# Patient Record
Sex: FEMALE | Race: BLACK OR AFRICAN AMERICAN | NOT HISPANIC OR LATINO
[De-identification: names, ages, dates, MRNs, and addresses within clinical notes are randomized per-mention and may not be internally consistent; named-entity substitution may affect disease eponyms.]

---

## 2017-07-06 ENCOUNTER — APPOINTMENT (OUTPATIENT)
Dept: PLASTIC SURGERY | Facility: CLINIC | Age: 47
End: 2017-07-06

## 2017-07-06 DIAGNOSIS — Z87.39 PERSONAL HISTORY OF OTHER DISEASES OF THE MUSCULOSKELETAL SYSTEM AND CONNECTIVE TISSUE: ICD-10-CM

## 2017-07-06 DIAGNOSIS — Z78.9 OTHER SPECIFIED HEALTH STATUS: ICD-10-CM

## 2017-07-06 DIAGNOSIS — Z86.79 PERSONAL HISTORY OF OTHER DISEASES OF THE CIRCULATORY SYSTEM: ICD-10-CM

## 2017-07-06 PROBLEM — Z00.00 ENCOUNTER FOR PREVENTIVE HEALTH EXAMINATION: Status: ACTIVE | Noted: 2017-07-06

## 2017-07-06 RX ORDER — CHLORHEXIDINE GLUCONATE, 0.12% ORAL RINSE 1.2 MG/ML
0.12 SOLUTION DENTAL
Qty: 473 | Refills: 0 | Status: ACTIVE | COMMUNITY
Start: 2017-06-23

## 2017-07-06 RX ORDER — OXYCODONE AND ACETAMINOPHEN 5; 325 MG/1; MG/1
5-325 TABLET ORAL
Qty: 20 | Refills: 0 | Status: ACTIVE | COMMUNITY
Start: 2017-06-23

## 2017-07-06 RX ORDER — IBUPROFEN 600 MG/1
600 TABLET, FILM COATED ORAL
Qty: 20 | Refills: 0 | Status: ACTIVE | COMMUNITY
Start: 2017-06-23

## 2017-07-06 RX ORDER — ALPRAZOLAM 2 MG/1
TABLET ORAL
Refills: 0 | Status: ACTIVE | COMMUNITY

## 2017-07-06 RX ORDER — LOSARTAN POTASSIUM AND HYDROCHLOROTHIAZIDE 12.5; 5 MG/1; MG/1
50-12.5 TABLET ORAL
Qty: 15 | Refills: 0 | Status: ACTIVE | COMMUNITY
Start: 2017-06-19

## 2017-07-06 RX ORDER — DICLOFENAC EPOLAMINE 0.01 G/1
1.3 SYSTEM TOPICAL
Qty: 30 | Refills: 0 | Status: ACTIVE | COMMUNITY
Start: 2017-01-18

## 2017-07-10 ENCOUNTER — OUTPATIENT (OUTPATIENT)
Dept: OUTPATIENT SERVICES | Facility: HOSPITAL | Age: 47
LOS: 1 days | End: 2017-07-10
Payer: COMMERCIAL

## 2017-07-10 PROCEDURE — 70487 CT MAXILLOFACIAL W/DYE: CPT

## 2017-07-10 PROCEDURE — 73706 CT ANGIO LWR EXTR W/O&W/DYE: CPT

## 2017-07-10 PROCEDURE — 70491 CT SOFT TISSUE NECK W/DYE: CPT | Mod: 26

## 2017-07-10 PROCEDURE — 70491 CT SOFT TISSUE NECK W/DYE: CPT

## 2017-07-10 PROCEDURE — 73706 CT ANGIO LWR EXTR W/O&W/DYE: CPT | Mod: 26,50

## 2017-07-10 PROCEDURE — 70487 CT MAXILLOFACIAL W/DYE: CPT | Mod: 26

## 2017-07-12 ENCOUNTER — OUTPATIENT (OUTPATIENT)
Dept: OUTPATIENT SERVICES | Facility: HOSPITAL | Age: 47
LOS: 1 days | End: 2017-07-12
Payer: COMMERCIAL

## 2017-07-12 ENCOUNTER — RESULT REVIEW (OUTPATIENT)
Age: 47
End: 2017-07-12

## 2017-07-12 DIAGNOSIS — C41.1 MALIGNANT NEOPLASM OF MANDIBLE: ICD-10-CM

## 2017-07-14 ENCOUNTER — OUTPATIENT (OUTPATIENT)
Dept: OUTPATIENT SERVICES | Facility: HOSPITAL | Age: 47
LOS: 1 days | End: 2017-07-14
Payer: COMMERCIAL

## 2017-07-14 PROCEDURE — 78815 PET IMAGE W/CT SKULL-THIGH: CPT

## 2017-07-14 PROCEDURE — 78815 PET IMAGE W/CT SKULL-THIGH: CPT | Mod: 26

## 2017-07-14 PROCEDURE — A9552: CPT

## 2017-07-25 VITALS
HEART RATE: 82 BPM | HEIGHT: 66.5 IN | DIASTOLIC BLOOD PRESSURE: 83 MMHG | TEMPERATURE: 98 F | WEIGHT: 227.3 LBS | SYSTOLIC BLOOD PRESSURE: 142 MMHG | OXYGEN SATURATION: 100 % | RESPIRATION RATE: 16 BRPM

## 2017-07-25 NOTE — PATIENT PROFILE ADULT. - PMH
Anxiety    GERD (gastroesophageal reflux disease)    HTN (hypertension)    SCC (squamous cell carcinoma)  of mouth

## 2017-07-26 ENCOUNTER — INPATIENT (INPATIENT)
Facility: HOSPITAL | Age: 47
LOS: 7 days | Discharge: HOME HEALTH | DRG: 12 | End: 2017-08-03
Attending: DENTIST | Admitting: DENTIST
Payer: COMMERCIAL

## 2017-07-26 ENCOUNTER — RESULT REVIEW (OUTPATIENT)
Age: 47
End: 2017-07-26

## 2017-07-26 DIAGNOSIS — Z98.890 OTHER SPECIFIED POSTPROCEDURAL STATES: Chronic | ICD-10-CM

## 2017-07-26 LAB
ANION GAP SERPL CALC-SCNC: 12 MMOL/L — SIGNIFICANT CHANGE UP (ref 5–17)
APTT BLD: 34.6 SEC — SIGNIFICANT CHANGE UP (ref 27.5–37.4)
BASE EXCESS BLDA CALC-SCNC: -3.5 MMOL/L — LOW (ref -2–3)
BUN SERPL-MCNC: 13 MG/DL — SIGNIFICANT CHANGE UP (ref 7–23)
CA-I BLDA-SCNC: 1.05 MMOL/L — LOW (ref 1.12–1.3)
CALCIUM SERPL-MCNC: 8.6 MG/DL — SIGNIFICANT CHANGE UP (ref 8.4–10.5)
CHLORIDE SERPL-SCNC: 106 MMOL/L — SIGNIFICANT CHANGE UP (ref 96–108)
CO2 SERPL-SCNC: 22 MMOL/L — SIGNIFICANT CHANGE UP (ref 22–31)
COHGB MFR BLDA: 0.4 % — SIGNIFICANT CHANGE UP
CREAT SERPL-MCNC: 0.7 MG/DL — SIGNIFICANT CHANGE UP (ref 0.5–1.3)
GAS PNL BLDA: SIGNIFICANT CHANGE UP
GLUCOSE SERPL-MCNC: 162 MG/DL — HIGH (ref 70–99)
HCO3 BLDA-SCNC: 21 MMOL/L — SIGNIFICANT CHANGE UP (ref 21–28)
HCT VFR BLD CALC: 34.5 % — SIGNIFICANT CHANGE UP (ref 34.5–45)
HGB BLD-MCNC: 10.8 G/DL — LOW (ref 11.5–15.5)
HGB BLDA-MCNC: 11.4 G/DL — LOW (ref 11.5–15.5)
INR BLD: 1.21 — HIGH (ref 0.88–1.16)
MAGNESIUM SERPL-MCNC: 1.7 MG/DL — SIGNIFICANT CHANGE UP (ref 1.6–2.6)
MCHC RBC-ENTMCNC: 25.5 PG — LOW (ref 27–34)
MCHC RBC-ENTMCNC: 31.3 G/DL — LOW (ref 32–36)
MCV RBC AUTO: 81.4 FL — SIGNIFICANT CHANGE UP (ref 80–100)
METHGB MFR BLDA: 0.5 % — SIGNIFICANT CHANGE UP
O2 CT VFR BLDA CALC: 16.2 ML/DL — SIGNIFICANT CHANGE UP (ref 15–23)
OXYHGB MFR BLDA: 98 % — SIGNIFICANT CHANGE UP (ref 94–100)
PCO2 BLDA: 38 MMHG — SIGNIFICANT CHANGE UP (ref 32–45)
PH BLDA: 7.37 — SIGNIFICANT CHANGE UP (ref 7.35–7.45)
PHOSPHATE SERPL-MCNC: 3.7 MG/DL — SIGNIFICANT CHANGE UP (ref 2.5–4.5)
PLATELET # BLD AUTO: 152 K/UL — SIGNIFICANT CHANGE UP (ref 150–400)
PO2 BLDA: 188 MMHG — HIGH (ref 83–108)
POTASSIUM BLDA-SCNC: 4.1 MMOL/L — SIGNIFICANT CHANGE UP (ref 3.5–4.9)
POTASSIUM SERPL-MCNC: 4 MMOL/L — SIGNIFICANT CHANGE UP (ref 3.5–5.3)
POTASSIUM SERPL-SCNC: 4 MMOL/L — SIGNIFICANT CHANGE UP (ref 3.5–5.3)
PROTHROM AB SERPL-ACNC: 13.5 SEC — HIGH (ref 9.8–12.7)
RBC # BLD: 4.24 M/UL — SIGNIFICANT CHANGE UP (ref 3.8–5.2)
RBC # FLD: 15.6 % — SIGNIFICANT CHANGE UP (ref 10.3–16.9)
SAO2 % BLDA: 99 % — SIGNIFICANT CHANGE UP (ref 95–100)
SODIUM BLDA-SCNC: 143 MMOL/L — SIGNIFICANT CHANGE UP (ref 138–146)
SODIUM SERPL-SCNC: 140 MMOL/L — SIGNIFICANT CHANGE UP (ref 135–145)
WBC # BLD: 11.5 K/UL — HIGH (ref 3.8–10.5)
WBC # FLD AUTO: 11.5 K/UL — HIGH (ref 3.8–10.5)

## 2017-07-26 PROCEDURE — 40845 RECONSTRUCTION OF MOUTH: CPT | Mod: 59

## 2017-07-26 PROCEDURE — 20969 BONE/SKIN GRAFT MICROVASC: CPT

## 2017-07-26 PROCEDURE — 71010: CPT | Mod: 26

## 2017-07-26 PROCEDURE — 15100 SPLT AGRFT T/A/L 1ST 100SQCM: CPT | Mod: 59

## 2017-07-26 PROCEDURE — 13132 CMPLX RPR F/C/C/M/N/AX/G/H/F: CPT | Mod: 59

## 2017-07-26 PROCEDURE — 99231 SBSQ HOSP IP/OBS SF/LOW 25: CPT | Mod: GC

## 2017-07-26 PROCEDURE — 88321 CONSLTJ&REPRT SLD PREP ELSWR: CPT

## 2017-07-26 PROCEDURE — 97606 NEG PRS WND THER DME>50 SQCM: CPT | Mod: 59

## 2017-07-26 RX ORDER — PROPOFOL 10 MG/ML
1 INJECTION, EMULSION INTRAVENOUS
Qty: 1000 | Refills: 0 | Status: DISCONTINUED | OUTPATIENT
Start: 2017-07-26 | End: 2017-07-27

## 2017-07-26 RX ORDER — PANTOPRAZOLE SODIUM 20 MG/1
40 TABLET, DELAYED RELEASE ORAL DAILY
Qty: 0 | Refills: 0 | Status: DISCONTINUED | OUTPATIENT
Start: 2017-07-27 | End: 2017-08-03

## 2017-07-26 RX ORDER — SODIUM CHLORIDE 9 MG/ML
1000 INJECTION, SOLUTION INTRAVENOUS
Qty: 0 | Refills: 0 | Status: DISCONTINUED | OUTPATIENT
Start: 2017-07-26 | End: 2017-07-28

## 2017-07-26 RX ORDER — METRONIDAZOLE 500 MG
TABLET ORAL
Qty: 0 | Refills: 0 | Status: DISCONTINUED | OUTPATIENT
Start: 2017-07-26 | End: 2017-08-02

## 2017-07-26 RX ORDER — CEFAZOLIN SODIUM 1 G
1000 VIAL (EA) INJECTION ONCE
Qty: 0 | Refills: 0 | Status: COMPLETED | OUTPATIENT
Start: 2017-07-26 | End: 2017-07-26

## 2017-07-26 RX ORDER — AMPICILLIN SODIUM AND SULBACTAM SODIUM 250; 125 MG/ML; MG/ML
3 INJECTION, POWDER, FOR SUSPENSION INTRAMUSCULAR; INTRAVENOUS ONCE
Qty: 0 | Refills: 0 | Status: COMPLETED | OUTPATIENT
Start: 2017-07-26 | End: 2017-07-26

## 2017-07-26 RX ORDER — INSULIN LISPRO 100/ML
VIAL (ML) SUBCUTANEOUS
Qty: 0 | Refills: 0 | Status: DISCONTINUED | OUTPATIENT
Start: 2017-07-26 | End: 2017-08-03

## 2017-07-26 RX ORDER — CEFAZOLIN SODIUM 1 G
VIAL (EA) INJECTION
Qty: 0 | Refills: 0 | Status: DISCONTINUED | OUTPATIENT
Start: 2017-07-26 | End: 2017-08-02

## 2017-07-26 RX ORDER — METRONIDAZOLE 500 MG
500 TABLET ORAL ONCE
Qty: 0 | Refills: 0 | Status: COMPLETED | OUTPATIENT
Start: 2017-07-26 | End: 2017-07-26

## 2017-07-26 RX ORDER — ENOXAPARIN SODIUM 100 MG/ML
40 INJECTION SUBCUTANEOUS DAILY
Qty: 0 | Refills: 0 | Status: DISCONTINUED | OUTPATIENT
Start: 2017-07-26 | End: 2017-08-03

## 2017-07-26 RX ORDER — CEFAZOLIN SODIUM 1 G
1000 VIAL (EA) INJECTION EVERY 8 HOURS
Qty: 0 | Refills: 0 | Status: DISCONTINUED | OUTPATIENT
Start: 2017-07-27 | End: 2017-08-02

## 2017-07-26 RX ORDER — FENTANYL CITRATE 50 UG/ML
0.5 INJECTION INTRAVENOUS
Qty: 2500 | Refills: 0 | Status: DISCONTINUED | OUTPATIENT
Start: 2017-07-26 | End: 2017-07-27

## 2017-07-26 RX ORDER — METRONIDAZOLE 500 MG
500 TABLET ORAL EVERY 8 HOURS
Qty: 0 | Refills: 0 | Status: DISCONTINUED | OUTPATIENT
Start: 2017-07-27 | End: 2017-08-02

## 2017-07-26 RX ADMIN — Medication 100 MILLIGRAM(S): at 23:30

## 2017-07-26 RX ADMIN — AMPICILLIN SODIUM AND SULBACTAM SODIUM 200 GRAM(S): 250; 125 INJECTION, POWDER, FOR SUSPENSION INTRAMUSCULAR; INTRAVENOUS at 21:18

## 2017-07-26 NOTE — CONSULT NOTE ADULT - ATTENDING COMMENTS
47F sp ENT surgery ho HTN. plan to wean sedation and evaluate for SBT/ extubation in immediate post op period. will resume HTN meds in am as guided by BP.

## 2017-07-26 NOTE — H&P ADULT - HISTORY OF PRESENT ILLNESS
History of Present Illness  [id number] 47 F presents for a consultation. 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 Went to dentist and was referred to oral surgery for biopsy who identified SCC - invading mandible.     POD0 s/p Tracheostomy (6-0 shiley), modified right neck dissection, Right segmental mandibulectomy, left fibula MVFF, Left thigh STSG     ActiveProblems_20_twCiteListControlEnd   ActiveProblemsSectionEnd  PastMedicalHistorySectionStart Past Medical History  PastMedicalHistory_20_twCiteListControlStart History of arthritis   History of hypertension PastMedicalHistory_20_twCiteListControlEnd     PastMedicalHistorySectionEnd  SurgicalHistorySectionStart Surgical History  SurgicalHistory_20_twCiteListControlStart History of Cervical Conization Loop Electrode Excision  History of Uterine Myomectomy  SurgicalHistory_20_twCiteListControlEnd     SurgicalHistorySectionEnd   SocialHistorySectionStart Social History  SocialHistory_20_twCiteListControlStart Never a smoker  Social alcohol use SocialHistory_20_twCiteListControlEnd     SocialHistorySectionEnd  CurrentMedsSectionStart   CurrentMeds_4_twCiteListControlEnd   CurrentMedsSectionEnd  AllergiesSectionStart Allergies  Allergies_20_twCiteListControlStart NKDA. Rash w Aspirin     PE  6-0 cuffed shiley with cuff up. Minimal oozing as expectec Stay sutures taped to chest. no trach collar, trach sutured in place  Right neck arterial  and STEPHEN - doppler signal strong  Intra oral flap soft, pink, warm  neck incision c/d/i, stephen holding suction, minimal ss in bulb  Left thigh skin graft site covered with tegaderm  L LE in ace bandage, STEPHEN and Vac holding suction    A/p  - Wean off sedation and ventilor to trach collar  - routine trach care, use red rubbers only to suction trach.   - No trach collar   - bedrest, HOB elevated, head in neutral position, no turning  - resume home medes  - Lovenox 40 daily to start tonight a  - NPO  - IVF and pain management per SICU  - monitoring STEPHEN output  - consider removing radha and krishna tomorrow  Please page ENT with any questions or issues

## 2017-07-26 NOTE — CONSULT NOTE ADULT - SUBJECTIVE AND OBJECTIVE BOX
SICU CONSULT NOTE    HPI:  47 F presents for a consultation. 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 Went to dentist and was referred to oral surgery for biopsy who identified SCC - invading mandible.     POD0 s/p Tracheostomy (6-0 shiley), modified right neck dissection, Right segmental mandibulectomy, left fibula MVFF, Left thigh STSG     SURGERY ADDENDUM:   Admitted to SICU for post-op management.      PAST MEDICAL HISTORY:  Anxiety  SCC (squamous cell carcinoma): of mouth  GERD (gastroesophageal reflux disease)  HTN (hypertension)    PAST SURGICAL HISTORY:   Cervical conization loop electrode excision  Uterine myomectomy     REVIEW OF SYSTEMS:   Pertinent positives/negatives noted in HPI.     HOME MEDICATIONS:  ·  losartan-hydrochlorothiazide 50mg-12.5mg oral tablet: 1 tab(s) orally once a day, Last Dose Taken: 25-Jul-2017 11:00 AM  ·  Xanax 0.5 mg oral tablet: 1 tab(s) orally 3 times a day, As Needed, Last Dose Taken: 25-Jul-2017 11:00 PM    ALLERGIES:   aspirin (Stomach Upset)    SOCIAL HISTORY:  Never smoker. Social EtOH    FAMILY HISTORY:  No pertinent family history in first degree relatives    Vital Signs Last 24 Hrs  T(C): 36.6 (26 Jul 2017 19:00), Max: 36.6 (26 Jul 2017 19:00)  T(F): 97.9 (26 Jul 2017 19:00), Max: 97.9 (26 Jul 2017 19:00)  HR: 76 (26 Jul 2017 21:25) (74 - 78)  BP: 102/57 (26 Jul 2017 21:00) (102/57 - 111/63)  BP(mean): 70 (26 Jul 2017 21:00) (70 - 78)  RR: 12 (26 Jul 2017 21:25) (11 - 12)  SpO2: 99% (26 Jul 2017 21:25) (99% - 99%)    PHYSICAL EXAM:  NEURO: Sedated.  HEENT: Right neck incision C/D/I; arterial doppler signal triphasic; R neck LAWRENCE drain to bulb suction- SS.   CV: RRR, S1&S2.   PULM: 6-0 cuffed shiley trach, CTAB.  ABD: Soft, obese, ND, NT.   EXTR: Left thigh skin graft donor site pink, covered w/ tegaderm. LLE wrapped in ACE bandage; vac in place w/ good seal; LLE LAWRENCE to bulb suction- SS.     LABS:                        10.8   11.5  )-----------( 152      ( 26 Jul 2017 19:51 )             34.5     07-26    140  |  106  |  13  ----------------------------<  162<H>  4.0   |  22  |  0.70    Ca    8.6      26 Jul 2017 19:51  Phos  3.7     07-26  Mg     1.7     07-26      PT/INR - ( 26 Jul 2017 19:51 )   PT: 13.5 sec;   INR: 1.21          PTT - ( 26 Jul 2017 19:51 )  PTT:34.6 sec SICU CONSULT NOTE    HPI:  47 F presents for a consultation. 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 Went to dentist and was referred to oral surgery for biopsy who identified SCC - invading mandible.     POD0 s/p Tracheostomy (6-0 shiley), modified right neck dissection, Right segmental mandibulectomy, left fibula MVFF, Left thigh STSG     SURGERY ADDENDUM:   Admitted to SICU for post-op management.      PAST MEDICAL HISTORY:  Anxiety  SCC (squamous cell carcinoma): of mouth  GERD (gastroesophageal reflux disease)  HTN (hypertension)    PAST SURGICAL HISTORY:   Cervical conization loop electrode excision  Uterine myomectomy     REVIEW OF SYSTEMS:   Pertinent positives/negatives noted in HPI.     HOME MEDICATIONS:  ·  losartan-hydrochlorothiazide 50mg-12.5mg oral tablet: 1 tab(s) orally once a day, Last Dose Taken: 25-Jul-2017 11:00 AM  ·  Xanax 0.5 mg oral tablet: 1 tab(s) orally 3 times a day, As Needed, Last Dose Taken: 25-Jul-2017 11:00 PM    ALLERGIES:   aspirin (Stomach Upset)    SOCIAL HISTORY:  Never smoker. Social EtOH    FAMILY HISTORY:  No pertinent family history in first degree relatives    Vital Signs Last 24 Hrs  T(C): 36.6 (26 Jul 2017 19:00), Max: 36.6 (26 Jul 2017 19:00)  T(F): 97.9 (26 Jul 2017 19:00), Max: 97.9 (26 Jul 2017 19:00)  HR: 76 (26 Jul 2017 21:25) (74 - 78)  BP: 102/57 (26 Jul 2017 21:00) (102/57 - 111/63)  BP(mean): 70 (26 Jul 2017 21:00) (70 - 78)  RR: 12 (26 Jul 2017 21:25) (11 - 12)  SpO2: 99% (26 Jul 2017 21:25) (99% - 99%)    PHYSICAL EXAM:  NEURO: Sedated.  HEENT: Right neck incision C/D/I; arterial doppler signal triphasic; R neck LAWRENCE drain to bulb suction- SS.   CV: RRR, S1&S2.   PULM: 6-0 cuffed shiley trach, CTAB.  ABD: Soft, obese, ND, NT.   EXTR: Left thigh skin graft donor site pink, covered w/ tegaderm. LLE wrapped in ACE bandage; vac in place w/ good seal; LLE LAWRENCE to bulb suction- SS.     LABS:                        10.8   11.5  )-----------( 152      ( 26 Jul 2017 19:51 )             34.5     140  |  106  |  13  ----------------------------<  162<H>  4.0   |  22  |  0.70    Ca    8.6      26 Jul 2017 19:51  Phos  3.7     07-26  Mg     1.7     07-26    PT/INR - ( 26 Jul 2017 19:51 )   PT: 13.5 sec;   INR: 1.21     PTT - ( 26 Jul 2017 19:51 )  PTT:34.6 sec

## 2017-07-26 NOTE — BRIEF OPERATIVE NOTE - OPERATION/FINDINGS
Right mandibular mass excised. surgical margins negative on intraoperative frozen specimens. MVFF left fibula used to reconstruct right mandible. tracheostomy, 6-0 shiley. Selective right neck dissection, Right mandibular mass excised. surgical margins negative on intraoperative frozen specimens. MVFF left fibula used to reconstruct right mandible.

## 2017-07-26 NOTE — BRIEF OPERATIVE NOTE - PROCEDURE
Free flap, fibula, osteocutaneous, with microvascular anastomosis  07/26/2017    Active  [place] [id number]  Mandibulectomy, with tracheostomy  07/26/2017    Active  [place] [id number] Neck dissection, modified radical  07/26/2017    [facility]  Tracheostomy  07/26/2017    [facility]  [facility]  Split thickness skin graft of left thigh  07/26/2017    Active  [facility]

## 2017-07-26 NOTE — H&P ADULT - REASON FOR ADMISSION
Tracheostomy, modified right neck dissection, Right segmental mandibulectomy, left fibula MVFF, Left thigh STSG

## 2017-07-26 NOTE — CONSULT NOTE ADULT - ASSESSMENT
47F w/ mandibular SCC now s/p right mandibular mass excision, selective right ND, left fibula reconstruction of right mandible, STSG from left thigh & tracheostomy.     Neuro: Fentanyl gtt, Propofol gtt  HEENT: flap checks q1hr; arterial doppler monitoring; HOB elevated; head in neutral position; no turning  CV: MAP >65; LR@100  Pulm: 6-0 cuffed shiley trach; red rubber suction only to trach  GI/FEN: NPO, Protonix, Dobhoff- start TFs tomorrow   : Arleen  ID: Ancef, Flagyl  Endo: ISS  Heme: Lovenox   PPx: Lovenox, SCD to RLE  Lines: PIV, A-line  Drains: R neck LAWRENCE to bulb suction; LLE LAWRENCE to bulb suction 47F w/ mandibular SCC now s/p right mandibular mass excision, selective right ND, left fibula reconstruction of right mandible, STSG from left thigh & tracheostomy.     Neuro: Fentanyl gtt, Propofol gtt  HEENT: flap checks q1hr; arterial doppler monitoring; HOB elevated; head in neutral position; no turning  CV: MAP >65; LR@100  Pulm: 6-0 cuffed shiley trach; AC/CMV- 450/12/5/40; red rubber suction only to trach  GI/FEN: NPO, Protonix, Dobhoff- start TFs tomorrow   : Arleen  ID: Ancef, Flagyl  Endo: ISS  Heme: Lovenox   PPx: Lovenox, SCD to RLE  Lines: PIV, A-line  Drains: R neck LAWRENCE to bulb suction; LLE LAWRENCE to bulb suction; Vac to LLE

## 2017-07-27 LAB
ANION GAP SERPL CALC-SCNC: 11 MMOL/L — SIGNIFICANT CHANGE UP (ref 5–17)
BUN SERPL-MCNC: 11 MG/DL — SIGNIFICANT CHANGE UP (ref 7–23)
CALCIUM SERPL-MCNC: 7.8 MG/DL — LOW (ref 8.4–10.5)
CHLORIDE SERPL-SCNC: 107 MMOL/L — SIGNIFICANT CHANGE UP (ref 96–108)
CO2 SERPL-SCNC: 23 MMOL/L — SIGNIFICANT CHANGE UP (ref 22–31)
CREAT SERPL-MCNC: 0.7 MG/DL — SIGNIFICANT CHANGE UP (ref 0.5–1.3)
GLUCOSE SERPL-MCNC: 128 MG/DL — HIGH (ref 70–99)
HCT VFR BLD CALC: 29.7 % — LOW (ref 34.5–45)
HGB BLD-MCNC: 9.6 G/DL — LOW (ref 11.5–15.5)
MAGNESIUM SERPL-MCNC: 1.8 MG/DL — SIGNIFICANT CHANGE UP (ref 1.6–2.6)
MCHC RBC-ENTMCNC: 25.9 PG — LOW (ref 27–34)
MCHC RBC-ENTMCNC: 32.3 G/DL — SIGNIFICANT CHANGE UP (ref 32–36)
MCV RBC AUTO: 80.1 FL — SIGNIFICANT CHANGE UP (ref 80–100)
PHOSPHATE SERPL-MCNC: 3.6 MG/DL — SIGNIFICANT CHANGE UP (ref 2.5–4.5)
PLATELET # BLD AUTO: 137 K/UL — LOW (ref 150–400)
POTASSIUM SERPL-MCNC: 3.6 MMOL/L — SIGNIFICANT CHANGE UP (ref 3.5–5.3)
POTASSIUM SERPL-SCNC: 3.6 MMOL/L — SIGNIFICANT CHANGE UP (ref 3.5–5.3)
RBC # BLD: 3.71 M/UL — LOW (ref 3.8–5.2)
RBC # FLD: 15.6 % — SIGNIFICANT CHANGE UP (ref 10.3–16.9)
SODIUM SERPL-SCNC: 141 MMOL/L — SIGNIFICANT CHANGE UP (ref 135–145)
WBC # BLD: 11.6 K/UL — HIGH (ref 3.8–10.5)
WBC # FLD AUTO: 11.6 K/UL — HIGH (ref 3.8–10.5)

## 2017-07-27 PROCEDURE — 99231 SBSQ HOSP IP/OBS SF/LOW 25: CPT | Mod: GC

## 2017-07-27 PROCEDURE — 71010: CPT | Mod: 26

## 2017-07-27 PROCEDURE — 71010: CPT | Mod: 26,77

## 2017-07-27 RX ORDER — HYDROMORPHONE HYDROCHLORIDE 2 MG/ML
1 INJECTION INTRAMUSCULAR; INTRAVENOUS; SUBCUTANEOUS EVERY 4 HOURS
Qty: 0 | Refills: 0 | Status: DISCONTINUED | OUTPATIENT
Start: 2017-07-27 | End: 2017-07-28

## 2017-07-27 RX ORDER — MAGNESIUM SULFATE 500 MG/ML
1 VIAL (ML) INJECTION ONCE
Qty: 0 | Refills: 0 | Status: COMPLETED | OUTPATIENT
Start: 2017-07-27 | End: 2017-07-27

## 2017-07-27 RX ORDER — HYDROMORPHONE HYDROCHLORIDE 2 MG/ML
0.5 INJECTION INTRAMUSCULAR; INTRAVENOUS; SUBCUTANEOUS EVERY 4 HOURS
Qty: 0 | Refills: 0 | Status: DISCONTINUED | OUTPATIENT
Start: 2017-07-27 | End: 2017-07-28

## 2017-07-27 RX ORDER — POTASSIUM CHLORIDE 20 MEQ
10 PACKET (EA) ORAL
Qty: 0 | Refills: 0 | Status: COMPLETED | OUTPATIENT
Start: 2017-07-27 | End: 2017-07-27

## 2017-07-27 RX ORDER — LOSARTAN POTASSIUM 100 MG/1
50 TABLET, FILM COATED ORAL DAILY
Qty: 0 | Refills: 0 | Status: DISCONTINUED | OUTPATIENT
Start: 2017-07-27 | End: 2017-08-03

## 2017-07-27 RX ORDER — ALPRAZOLAM 0.25 MG
0.25 TABLET ORAL EVERY 8 HOURS
Qty: 0 | Refills: 0 | Status: DISCONTINUED | OUTPATIENT
Start: 2017-07-27 | End: 2017-07-31

## 2017-07-27 RX ORDER — POTASSIUM CHLORIDE 20 MEQ
40 PACKET (EA) ORAL ONCE
Qty: 0 | Refills: 0 | Status: COMPLETED | OUTPATIENT
Start: 2017-07-27 | End: 2017-07-27

## 2017-07-27 RX ORDER — ACETAMINOPHEN 500 MG
1000 TABLET ORAL ONCE
Qty: 0 | Refills: 0 | Status: COMPLETED | OUTPATIENT
Start: 2017-07-27 | End: 2017-07-27

## 2017-07-27 RX ADMIN — HYDROMORPHONE HYDROCHLORIDE 0.5 MILLIGRAM(S): 2 INJECTION INTRAMUSCULAR; INTRAVENOUS; SUBCUTANEOUS at 16:00

## 2017-07-27 RX ADMIN — Medication 100 GRAM(S): at 07:38

## 2017-07-27 RX ADMIN — PROPOFOL 0.62 MICROGRAM(S)/KG/MIN: 10 INJECTION, EMULSION INTRAVENOUS at 05:10

## 2017-07-27 RX ADMIN — Medication 400 MILLIGRAM(S): at 20:35

## 2017-07-27 RX ADMIN — Medication 100 MILLIGRAM(S): at 13:55

## 2017-07-27 RX ADMIN — Medication 100 MILLIEQUIVALENT(S): at 14:29

## 2017-07-27 RX ADMIN — Medication 1000 MILLIGRAM(S): at 21:05

## 2017-07-27 RX ADMIN — ENOXAPARIN SODIUM 40 MILLIGRAM(S): 100 INJECTION SUBCUTANEOUS at 11:02

## 2017-07-27 RX ADMIN — Medication 100 MILLIEQUIVALENT(S): at 15:29

## 2017-07-27 RX ADMIN — HYDROMORPHONE HYDROCHLORIDE 0.5 MILLIGRAM(S): 2 INJECTION INTRAMUSCULAR; INTRAVENOUS; SUBCUTANEOUS at 11:45

## 2017-07-27 RX ADMIN — HYDROMORPHONE HYDROCHLORIDE 0.5 MILLIGRAM(S): 2 INJECTION INTRAMUSCULAR; INTRAVENOUS; SUBCUTANEOUS at 15:45

## 2017-07-27 RX ADMIN — PANTOPRAZOLE SODIUM 40 MILLIGRAM(S): 20 TABLET, DELAYED RELEASE ORAL at 11:03

## 2017-07-27 RX ADMIN — Medication 100 MILLIGRAM(S): at 22:22

## 2017-07-27 RX ADMIN — Medication 100 MILLIGRAM(S): at 07:38

## 2017-07-27 RX ADMIN — Medication 100 MILLIGRAM(S): at 22:21

## 2017-07-27 RX ADMIN — Medication 100 MILLIGRAM(S): at 07:34

## 2017-07-27 RX ADMIN — HYDROMORPHONE HYDROCHLORIDE 0.5 MILLIGRAM(S): 2 INJECTION INTRAMUSCULAR; INTRAVENOUS; SUBCUTANEOUS at 11:29

## 2017-07-27 RX ADMIN — LOSARTAN POTASSIUM 50 MILLIGRAM(S): 100 TABLET, FILM COATED ORAL at 17:39

## 2017-07-27 RX ADMIN — Medication 100 MILLIEQUIVALENT(S): at 12:10

## 2017-07-27 NOTE — DIETITIAN INITIAL EVALUATION ADULT. - NS AS NUTRI INTERV ENTERAL NUTRITION
Continue Osmolite 1.2 via NGT @ goal rate of 65 cc/hr x 24 hrs. Provides: 1872 kcal, TV: 1560 cc, pro:86 g, free water:  1279 cc.

## 2017-07-27 NOTE — DIETITIAN INITIAL EVALUATION ADULT. - ENERGY NEEDS
Height: 66" Weight: 227lbs, IBW 130lbs+/-10%, %%, BMI - 36.1  IBW used to calculate energy needs due to pt's current body weight exceeding 120% of IBW   Nutrient needs based on [facility] standards of care for maintenance in adults, needs adjusted 2/2 surgery

## 2017-07-27 NOTE — PROGRESS NOTE ADULT - SUBJECTIVE AND OBJECTIVE BOX
[facility] PROGRESS NOTE    HPI: [id number] 47 F w/ SCCa of the right mandible now s/p Tracheostomy (6-0 shiley), modified right neck dissection, Right segmental mandibulectomy, left fibula MVFF, Left thigh STSG on     PMH: arthritis, HTN  PSH: LEEP, uterine myomectomy  All: ASA - GI upset    POD0: Transferred directly to SICU from OR. Weaning sedation overnight, remained on vent. Flap checks wnl.   POD1: LEXIE overnight. AFVSS. Flap checks wnl. Weaned to TC.     Vital Signs Last 24 Hrs  T(C): 37.6 (2017 09:11), Max: 37.6 (2017 09:11)  T(F): 99.6 (2017 09:11), Max: 99.6 (2017 09:11)  HR: 92 (2017 10:00) (72 - 92)  BP: 104/54 (2017 22:00) (102/57 - 111/63)  BP(mean): 71 (2017 22:00) (70 - 78)  RR: 14 (2017 10:00) (10 - 23)  SpO2: 99% (2017 10:00) (98% - 100%)    PE:  6-0 cuffed shiley with cuff up sutures x4, Minimal oozing as expected, Stay sutures taped to chest  Right neck arterial  +triphasic signal   R neck soft, flat, incision c/d/i, stephen holding suction, minimal ss in bulb  Intra oral flap soft, pink, warm  NGT in place  Left thigh skin graft site covered with tegaderm, mild sang pooling underneath  L LE in ace bandage, STEPHEN and Vac holding suction  moving toes, feet warm     STEPHEN neck: minimal  STEPHEN le  Wound vac: scant     Labs:                        9.6    11.6  )-----------( 137      ( 2017 05:32 )             29.7     07-27    141  |  107  |  11  ----------------------------<  128<H>  3.6   |  23  |  0.70    Ca    7.8<L>      2017 05:39  Phos  3.6       Mg     1.8           A/P:  47 F w/ SCCa of the right mandible now s/p Tracheostomy (6-0 shiley), modified right neck dissection, Right segmental mandibulectomy, left fibula MVFF, Left thigh STSG on     - Continue trach collar   - routine trach care: spare trach at bedside, obturator HOB, use red rubbers only to suction trach, clean inner cannula BID and prn   - HOB elevated, head in neutral position, no turning, NO CIRCUMFERENTIAL NECK TIES  - flap checks per plastics protocol  - OOBTC today  - d/c Bacon once OOB  - NGT confirmed by CXR  - Start TF today  - resume home meds  - IVF and pain management per SICU  - monitoring STEPHEN output, drain care  -DVT ppx: SCDs, lovenox 40 daily   -NWB LLE per plastics   Please page ENT with any questions or issues    Dispo: SICU

## 2017-07-27 NOTE — DIETITIAN INITIAL EVALUATION ADULT. - OTHER INFO
47F s/p right mandibulectomy tracheostomy, reconstruction with left free fibula flap. POD 1. Pt NPO. Started on enteral feeds today; Osmolite 1.2 via NGT @ goal rate of 65 cc/hr x 24 hrs. Provides: 1872 kcal, TV: 1560 cc, pro:86 g, free water:  1279 cc.; Unable to obtain wt and diet hx 2/2 lethargy.  No n/v/d/c noted. NKFA noted.

## 2017-07-27 NOTE — PROGRESS NOTE ADULT - SUBJECTIVE AND OBJECTIVE BOX
Interval Events:  No issues overnight     Patient seen and examined at bedside.      Allergies    aspirin (Stomach Upset)    Intolerances        Vital Signs Last 24 Hrs  T(C): 37.6 (27 Jul 2017 09:11), Max: 37.6 (27 Jul 2017 09:11)  T(F): 99.6 (27 Jul 2017 09:11), Max: 99.6 (27 Jul 2017 09:11)  HR: 94 (27 Jul 2017 12:00) (72 - 98)  BP: 104/54 (26 Jul 2017 22:00) (102/57 - 111/63)  BP(mean): 71 (26 Jul 2017 22:00) (70 - 78)  RR: 13 (27 Jul 2017 12:00) (10 - 23)  SpO2: 100% (27 Jul 2017 12:00) (98% - 100%)    07-26 @ 07:01  -  07-27 @ 07:00  --------------------------------------------------------  IN: 1885 mL / OUT: 1215 mL / NET: 670 mL    07-27 @ 07:01 - 07-27 @ 12:17  --------------------------------------------------------  IN: 300 mL / OUT: 453 mL / NET: -153 mL      07-26 @ 07:01 - 07-27 @ 07:00  --------------------------------------------------------  IN: 1885 mL / OUT: 1215 mL / NET: 670 mL    07-27 @ 07:01 - 07-27 @ 12:17  --------------------------------------------------------  IN: 300 mL / OUT: 453 mL / NET: -153 mL          LABS:  ABG - ( 26 Jul 2017 16:36 )  pH: 7.37  /  pCO2: 38    /  pO2: 188   / HCO3: 21    / Base Excess: -3.5  /  SaO2: x                   CBC Full  -  ( 27 Jul 2017 05:32 )  WBC Count : 11.6 K/uL  Hemoglobin : 9.6 g/dL  Hematocrit : 29.7 %  Platelet Count - Automated : 137 K/uL  Mean Cell Volume : 80.1 fL  Mean Cell Hemoglobin : 25.9 pg  Mean Cell Hemoglobin Concentration : 32.3 g/dL  Auto Neutrophil # : x  Auto Lymphocyte # : x  Auto Monocyte # : x  Auto Eosinophil # : x  Auto Basophil # : x  Auto Neutrophil % : x  Auto Lymphocyte % : x  Auto Monocyte % : x  Auto Eosinophil % : x  Auto Basophil % : x    07-27    141  |  107  |  11  ----------------------------<  128<H>  3.6   |  23  |  0.70    Ca    7.8<L>      27 Jul 2017 05:39  Phos  3.6     07-27  Mg     1.8     07-27      PT/INR - ( 26 Jul 2017 19:51 )   PT: 13.5 sec;   INR: 1.21          PTT - ( 26 Jul 2017 19:51 )  PTT:34.6 sec                RADIOLOGY & ADDITIONAL STUDIES (The following images were personally reviewed):      Physical Exam:     Neuro: awake alert on trach collar  HEENT: Neck incision c/d/i + stephen ss, flap site warm to touch and pink, incisions c/d/i + trach site wnl good aeration doppler biphasic   CV: RRR Reg s1 s2 no M   Pulm: CTA B/L no w/r/r  Abd: Soft NT nd  Ext: No C/C/E Ext wwp + dp + LLE fib donor site with ace wrap good capillary refill to llext toes.+ STSG to Left thigh donor site with tegaderm and minimal healthy/appropriate post op bleeding.      A/p: 47F w/ mandibular SCC now s/p right mandibular mass excision, selective right ND, left fibula reconstruction of right mandible, STSG from left thigh & tracheostomy.     Neuro: Dilaudid prn  HEENT: flap checks q1hr; arterial doppler monitoring; HOB elevated; head in neutral position; no turning  CV: MAP >65; LR@100 wean ivf once tf at goal  Pulm: 6-0 cuffed shiley weaned to trach collar this am red rubber suction only to trach  GI/FEN: NPO, Protonix, Dobhoff- start TFs today osmolite 1.2@65  : chester keller removed this am   ID: Ancef( 7/27-) , Flagyl( 7/27-)   Endo: ISS  Heme: Lovenox   PPx: Lovenox, SCD to RLE  Lines: PIV, A-line  Drains: R neck STEPHEN to bulb suction; LLE STEPHEN to bulb suction; Vac to LLE  Dsipo : PT orderd NWB on LLE oob to chair Interval Events:  No issues overnight     Patient seen and examined at bedside.      Allergies    aspirin (Stomach Upset)    Intolerances        Vital Signs Last 24 Hrs  T(C): 37.6 (27 Jul 2017 09:11), Max: 37.6 (27 Jul 2017 09:11)  T(F): 99.6 (27 Jul 2017 09:11), Max: 99.6 (27 Jul 2017 09:11)  HR: 94 (27 Jul 2017 12:00) (72 - 98)  BP: 104/54 (26 Jul 2017 22:00) (102/57 - 111/63)  BP(mean): 71 (26 Jul 2017 22:00) (70 - 78)  RR: 13 (27 Jul 2017 12:00) (10 - 23)  SpO2: 100% (27 Jul 2017 12:00) (98% - 100%)    07-26 @ 07:01  -  07-27 @ 07:00  --------------------------------------------------------  IN: 1885 mL / OUT: 1215 mL / NET: 670 mL    07-27 @ 07:01 - 07-27 @ 12:17  --------------------------------------------------------  IN: 300 mL / OUT: 453 mL / NET: -153 mL      07-26 @ 07:01 - 07-27 @ 07:00  --------------------------------------------------------  IN: 1885 mL / OUT: 1215 mL / NET: 670 mL    07-27 @ 07:01 - 07-27 @ 12:17  --------------------------------------------------------  IN: 300 mL / OUT: 453 mL / NET: -153 mL          LABS:  ABG - ( 26 Jul 2017 16:36 )  pH: 7.37  /  pCO2: 38    /  pO2: 188   / HCO3: 21    / Base Excess: -3.5  /  SaO2: x                   CBC Full  -  ( 27 Jul 2017 05:32 )  WBC Count : 11.6 K/uL  Hemoglobin : 9.6 g/dL  Hematocrit : 29.7 %  Platelet Count - Automated : 137 K/uL  Mean Cell Volume : 80.1 fL  Mean Cell Hemoglobin : 25.9 pg  Mean Cell Hemoglobin Concentration : 32.3 g/dL  Auto Neutrophil # : x  Auto Lymphocyte # : x  Auto Monocyte # : x  Auto Eosinophil # : x  Auto Basophil # : x  Auto Neutrophil % : x  Auto Lymphocyte % : x  Auto Monocyte % : x  Auto Eosinophil % : x  Auto Basophil % : x    07-27    141  |  107  |  11  ----------------------------<  128<H>  3.6   |  23  |  0.70    Ca    7.8<L>      27 Jul 2017 05:39  Phos  3.6     07-27  Mg     1.8     07-27      PT/INR - ( 26 Jul 2017 19:51 )   PT: 13.5 sec;   INR: 1.21          PTT - ( 26 Jul 2017 19:51 )  PTT:34.6 sec                RADIOLOGY & ADDITIONAL STUDIES (The following images were personally reviewed):      Physical Exam:     Neuro: awake alert on trach collar  HEENT:PERRL Neck incision c/d/i + stephen ss, flap site warm to touch and pink, incisions c/d/i + trach site wnl good aeration doppler biphasic   CV: RRR Reg s1 s2 no M   Pulm: CTA B/L no w/r/r  Abd: Soft NT nd  : keller in place  ExtVasc: No C/C/E Ext wwp + dp + LLE fib donor site with ace wrap good capillary refill to ext toes.+ STSG to Left thigh donor site with tegaderm and minimal healthy/appropriate post op bleeding.    MSK: no joint swelling  Skin: no rash    A/p: 47F w/ mandibular SCC now s/p right mandibular mass excision, selective right ND, left fibula reconstruction of right mandible, STSG from left thigh & tracheostomy. Essential HTN.    Neuro: Dilaudid prn  HEENT: flap checks q1hr; arterial doppler monitoring; HOB elevated; head in neutral position; no turning  CV: MAP >65; LR@100 wean ivf once tf at goal; holding antihypertensive for now  Pulm: 6-0 cuffed shiley weaned to trach collar this am red rubber suction only to trach  GI/FEN: NPO, Protonix, Dobhoff- start TFs today osmolite 1.2@65  : chester keller removed this am   ID: Ancef( 7/27-) , Flagyl( 7/27-)   Endo: ISS  Heme: Lovenox   PPx: Lovenox, SCD to RLE  Lines: PIV, A-line  Drains: R neck STEPHEN to bulb suction; LLE STEPHEN to bulb suction; Vac to LLE  Dsipo : PT orderd NWB on LLE oob to chair

## 2017-07-27 NOTE — PROGRESS NOTE ADULT - SUBJECTIVE AND OBJECTIVE BOX
SUBJECTIVE:  Doing well.   No overnight events.     OBJECTIVE:     ** VITAL SIGNS / I&O's **    ICU Vital Signs Last 24 Hrs  T(C): 37.6 (27 Jul 2017 09:11), Max: 37.6 (27 Jul 2017 09:11)  T(F): 99.6 (27 Jul 2017 09:11), Max: 99.6 (27 Jul 2017 09:11)  HR: 86 (27 Jul 2017 07:00) (72 - 88)  BP: 104/54 (26 Jul 2017 22:00) (102/57 - 111/63)  BP(mean): 71 (26 Jul 2017 22:00) (70 - 78)  ABP: 120/114 (27 Jul 2017 07:00) (86/78 - 130/68)  ABP(mean): 116 (27 Jul 2017 07:00) (65 - 116)  RR: 17 (27 Jul 2017 07:00) (10 - 23)  SpO2: 100% (27 Jul 2017 07:00) (98% - 100%)        26 Jul 2017 07:01  -  27 Jul 2017 07:00  --------------------------------------------------------  IN:    fentaNYL  Infusion: 110 mL    IV PiggyBack: 500 mL    lactated ringers.: 1000 mL    propofol Infusion: 275 mL  Total IN: 1885 mL    OUT:    Bulb: 70 mL    Indwelling Catheter - Urethral: 1145 mL  Total OUT: 1215 mL    Total NET: 670 mL          ** PHYSICAL EXAM **    -- CONSTITUTIONAL: NAD, AAOx3  -- FLAP: Soft, good color with 2-3 second capillary refill. (+) arterial Cook Doppler signal.   -- NECK: incision c/d/i  -- RESPIRATORY: no respiratory distress  -- EXTREMITIES: left lower extremity, stephen serosanguinous, wound vac in place and holding suction, ace wrap c/d/i      ** LABS **                           9.6    11.6  )-----------( 137      ( 27 Jul 2017 05:32 )             29.7     27 Jul 2017 05:39    141    |  107    |  11     ----------------------------<  128    3.6     |  23     |  0.70     Ca    7.8        27 Jul 2017 05:39  Phos  3.6       27 Jul 2017 05:39  Mg     1.8       27 Jul 2017 05:39      PT/INR - ( 26 Jul 2017 19:51 )   PT: 13.5 sec;   INR: 1.21          PTT - ( 26 Jul 2017 19:51 )  PTT:34.6 sec  CAPILLARY BLOOD GLUCOSE  129 (26 Jul 2017 16:38)

## 2017-07-27 NOTE — DIETITIAN INITIAL EVALUATION ADULT. - DIET TYPE
Osmolite 1.2 via NGT @ goal rate of 65 cc/hr x 24 hrs. Provides: 1872 kcal, TV: 1560 cc, pro:86 g, free water:  1279 cc.

## 2017-07-27 NOTE — PROGRESS NOTE ADULT - ASSESSMENT
48 y/o female s/p mandibulectomy, neck dissection, free fibula, endosteal implants  - flap checks q 1 h  - may start tube feeds  - oob and weight bearing starting tomorrow  - plan as per ENT

## 2017-07-28 LAB
ANION GAP SERPL CALC-SCNC: 9 MMOL/L — SIGNIFICANT CHANGE UP (ref 5–17)
APTT BLD: 33.5 SEC — SIGNIFICANT CHANGE UP (ref 27.5–37.4)
BUN SERPL-MCNC: 8 MG/DL — SIGNIFICANT CHANGE UP (ref 7–23)
CALCIUM SERPL-MCNC: 8.1 MG/DL — LOW (ref 8.4–10.5)
CHLORIDE SERPL-SCNC: 108 MMOL/L — SIGNIFICANT CHANGE UP (ref 96–108)
CO2 SERPL-SCNC: 25 MMOL/L — SIGNIFICANT CHANGE UP (ref 22–31)
CREAT SERPL-MCNC: 0.6 MG/DL — SIGNIFICANT CHANGE UP (ref 0.5–1.3)
GLUCOSE SERPL-MCNC: 162 MG/DL — HIGH (ref 70–99)
HCT VFR BLD CALC: 31.6 % — LOW (ref 34.5–45)
HGB BLD-MCNC: 9.6 G/DL — LOW (ref 11.5–15.5)
INR BLD: 1.48 — HIGH (ref 0.88–1.16)
MAGNESIUM SERPL-MCNC: 2.1 MG/DL — SIGNIFICANT CHANGE UP (ref 1.6–2.6)
MCHC RBC-ENTMCNC: 25.6 PG — LOW (ref 27–34)
MCHC RBC-ENTMCNC: 30.4 G/DL — LOW (ref 32–36)
MCV RBC AUTO: 84.3 FL — SIGNIFICANT CHANGE UP (ref 80–100)
PHOSPHATE SERPL-MCNC: 2.5 MG/DL — SIGNIFICANT CHANGE UP (ref 2.5–4.5)
PLATELET # BLD AUTO: 136 K/UL — LOW (ref 150–400)
POTASSIUM SERPL-MCNC: 3.5 MMOL/L — SIGNIFICANT CHANGE UP (ref 3.5–5.3)
POTASSIUM SERPL-SCNC: 3.5 MMOL/L — SIGNIFICANT CHANGE UP (ref 3.5–5.3)
PROTHROM AB SERPL-ACNC: 16.6 SEC — HIGH (ref 9.8–12.7)
RBC # BLD: 3.75 M/UL — LOW (ref 3.8–5.2)
RBC # FLD: 15.7 % — SIGNIFICANT CHANGE UP (ref 10.3–16.9)
SODIUM SERPL-SCNC: 142 MMOL/L — SIGNIFICANT CHANGE UP (ref 135–145)
WBC # BLD: 10.6 K/UL — HIGH (ref 3.8–10.5)
WBC # FLD AUTO: 10.6 K/UL — HIGH (ref 3.8–10.5)

## 2017-07-28 RX ORDER — MORPHINE SULFATE 50 MG/1
4 CAPSULE, EXTENDED RELEASE ORAL EVERY 4 HOURS
Qty: 0 | Refills: 0 | Status: DISCONTINUED | OUTPATIENT
Start: 2017-07-28 | End: 2017-07-30

## 2017-07-28 RX ORDER — ACETAMINOPHEN 500 MG
1000 TABLET ORAL ONCE
Qty: 0 | Refills: 0 | Status: COMPLETED | OUTPATIENT
Start: 2017-07-28 | End: 2017-07-28

## 2017-07-28 RX ORDER — POTASSIUM CHLORIDE 20 MEQ
10 PACKET (EA) ORAL
Qty: 0 | Refills: 0 | Status: COMPLETED | OUTPATIENT
Start: 2017-07-28 | End: 2017-07-28

## 2017-07-28 RX ORDER — MORPHINE SULFATE 50 MG/1
2 CAPSULE, EXTENDED RELEASE ORAL EVERY 4 HOURS
Qty: 0 | Refills: 0 | Status: DISCONTINUED | OUTPATIENT
Start: 2017-07-28 | End: 2017-07-30

## 2017-07-28 RX ORDER — ONDANSETRON 8 MG/1
4 TABLET, FILM COATED ORAL EVERY 6 HOURS
Qty: 0 | Refills: 0 | Status: DISCONTINUED | OUTPATIENT
Start: 2017-07-28 | End: 2017-08-03

## 2017-07-28 RX ORDER — ONDANSETRON 8 MG/1
4 TABLET, FILM COATED ORAL EVERY 6 HOURS
Qty: 0 | Refills: 0 | Status: DISCONTINUED | OUTPATIENT
Start: 2017-07-28 | End: 2017-07-28

## 2017-07-28 RX ORDER — POLYETHYLENE GLYCOL 3350 17 G/17G
17 POWDER, FOR SOLUTION ORAL AT BEDTIME
Qty: 0 | Refills: 0 | Status: DISCONTINUED | OUTPATIENT
Start: 2017-07-28 | End: 2017-08-03

## 2017-07-28 RX ORDER — MORPHINE SULFATE 50 MG/1
1 CAPSULE, EXTENDED RELEASE ORAL ONCE
Qty: 0 | Refills: 0 | Status: DISCONTINUED | OUTPATIENT
Start: 2017-07-28 | End: 2017-07-28

## 2017-07-28 RX ORDER — MORPHINE SULFATE 50 MG/1
2 CAPSULE, EXTENDED RELEASE ORAL ONCE
Qty: 0 | Refills: 0 | Status: DISCONTINUED | OUTPATIENT
Start: 2017-07-28 | End: 2017-07-28

## 2017-07-28 RX ORDER — CHLORHEXIDINE GLUCONATE 213 G/1000ML
15 SOLUTION TOPICAL
Qty: 0 | Refills: 0 | Status: DISCONTINUED | OUTPATIENT
Start: 2017-07-28 | End: 2017-08-03

## 2017-07-28 RX ADMIN — MORPHINE SULFATE 2 MILLIGRAM(S): 50 CAPSULE, EXTENDED RELEASE ORAL at 18:00

## 2017-07-28 RX ADMIN — Medication 100 MILLIEQUIVALENT(S): at 12:00

## 2017-07-28 RX ADMIN — MORPHINE SULFATE 2 MILLIGRAM(S): 50 CAPSULE, EXTENDED RELEASE ORAL at 22:37

## 2017-07-28 RX ADMIN — Medication 100 MILLIEQUIVALENT(S): at 11:00

## 2017-07-28 RX ADMIN — MORPHINE SULFATE 2 MILLIGRAM(S): 50 CAPSULE, EXTENDED RELEASE ORAL at 17:39

## 2017-07-28 RX ADMIN — CHLORHEXIDINE GLUCONATE 15 MILLILITER(S): 213 SOLUTION TOPICAL at 17:40

## 2017-07-28 RX ADMIN — MORPHINE SULFATE 1 MILLIGRAM(S): 50 CAPSULE, EXTENDED RELEASE ORAL at 12:00

## 2017-07-28 RX ADMIN — MORPHINE SULFATE 1 MILLIGRAM(S): 50 CAPSULE, EXTENDED RELEASE ORAL at 11:38

## 2017-07-28 RX ADMIN — Medication 100 MILLIGRAM(S): at 14:42

## 2017-07-28 RX ADMIN — HYDROMORPHONE HYDROCHLORIDE 0.5 MILLIGRAM(S): 2 INJECTION INTRAMUSCULAR; INTRAVENOUS; SUBCUTANEOUS at 02:30

## 2017-07-28 RX ADMIN — Medication 100 MILLIGRAM(S): at 06:23

## 2017-07-28 RX ADMIN — Medication 0.25 MILLIGRAM(S): at 22:29

## 2017-07-28 RX ADMIN — PANTOPRAZOLE SODIUM 40 MILLIGRAM(S): 20 TABLET, DELAYED RELEASE ORAL at 11:39

## 2017-07-28 RX ADMIN — HYDROMORPHONE HYDROCHLORIDE 0.5 MILLIGRAM(S): 2 INJECTION INTRAMUSCULAR; INTRAVENOUS; SUBCUTANEOUS at 03:00

## 2017-07-28 RX ADMIN — ENOXAPARIN SODIUM 40 MILLIGRAM(S): 100 INJECTION SUBCUTANEOUS at 11:39

## 2017-07-28 RX ADMIN — Medication 1000 MILLIGRAM(S): at 07:30

## 2017-07-28 RX ADMIN — Medication 100 MILLIGRAM(S): at 22:22

## 2017-07-28 RX ADMIN — ONDANSETRON 4 MILLIGRAM(S): 8 TABLET, FILM COATED ORAL at 04:43

## 2017-07-28 RX ADMIN — POLYETHYLENE GLYCOL 3350 17 GRAM(S): 17 POWDER, FOR SOLUTION ORAL at 22:22

## 2017-07-28 RX ADMIN — ONDANSETRON 4 MILLIGRAM(S): 8 TABLET, FILM COATED ORAL at 11:39

## 2017-07-28 RX ADMIN — Medication 1000 MILLIGRAM(S): at 15:30

## 2017-07-28 RX ADMIN — Medication 100 MILLIEQUIVALENT(S): at 09:40

## 2017-07-28 RX ADMIN — LOSARTAN POTASSIUM 50 MILLIGRAM(S): 100 TABLET, FILM COATED ORAL at 06:23

## 2017-07-28 RX ADMIN — MORPHINE SULFATE 2 MILLIGRAM(S): 50 CAPSULE, EXTENDED RELEASE ORAL at 23:07

## 2017-07-28 RX ADMIN — Medication 400 MILLIGRAM(S): at 15:12

## 2017-07-28 RX ADMIN — Medication 400 MILLIGRAM(S): at 07:00

## 2017-07-28 NOTE — PHYSICAL THERAPY INITIAL EVALUATION ADULT - PERTINENT HX OF CURRENT PROBLEM, REHAB EVAL
47 F presents for a consultation. 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 Went to dentist and was referred to oral surgery for biopsy who identified SCC - invading mandible. Please refer to H&P on Justice for remaining.

## 2017-07-28 NOTE — PHYSICAL THERAPY INITIAL EVALUATION ADULT - ADDITIONAL COMMENTS
Patient reports independence with all ADLs/IADLs prior to admission. Denies history of mechanical falls. Patient was actively working prior to admission (mainly computer work at desk). Patient, at this time, is unsure if someone will be able to stay with her upon discharge home from [facility].

## 2017-07-28 NOTE — PROGRESS NOTE ADULT - ASSESSMENT
47F w/ mandibular SCC now s/p right mandibular mass excision, selective right ND, left fibula reconstruction of right mandible, STSG from left thigh & tracheostomy.     Neuro: xanax prn, tylenol prn, morphine prn, zofran prn  HEENT: flap checks q2hr; arterial doppler monitoring; HOB elevated; head in neutral position; no turning, peridex  CV: MAP >65;  losartan   Pulm: 6-0 cuffed shiley trach collar red rubber suction only to trach  GI/FEN: NPO, Protonix, Dobhoff- osmolite 1.2@65   : voids  ID: Zachery Staples (7/26-)  Endo: ISS  Heme: Lovenox   PPx: Lovenox, SCD to RLE  Lines: PIV, A-line

## 2017-07-28 NOTE — PHYSICAL THERAPY INITIAL EVALUATION ADULT - SKIN INTEGRITY
surgical incision/(R) neck surgical incision and LAWRENCE intact; (L)LE ace wrapping and LAWRENCE intact pre and post treatment session

## 2017-07-28 NOTE — PROGRESS NOTE ADULT - SUBJECTIVE AND OBJECTIVE BOX
INTERVAL HPI/OVERNIGHT EVENTS:  O/N: Passed TOV.    Pt seen and examined at bedside.     PRESSORS: [ ] YES [X ] NO  WHICH:  DOSE:    ANTIBIOTICS:         ancef         DATE STARTED: 7/26  ANTIBIOTICS:         flagyl         DATE STARTED: 7/26  ANTIBIOTICS:                  DATE STARTED:    MEDICATIONS  (STANDING):  enoxaparin Injectable 40 milliGRAM(s) SubCutaneous daily  insulin lispro (HumaLOG) corrective regimen sliding scale   SubCutaneous Before meals and at bedtime  pantoprazole  Injectable 40 milliGRAM(s) IV Push daily  ceFAZolin   IVPB   IV Intermittent   ceFAZolin   IVPB 1000 milliGRAM(s) IV Intermittent every 8 hours  metroNIDAZOLE  IVPB   IV Intermittent   metroNIDAZOLE  IVPB 500 milliGRAM(s) IV Intermittent every 8 hours  losartan 50 milliGRAM(s) Oral daily  chlorhexidine 0.12% Liquid 15 milliLiter(s) Swish and Spit two times a day    MEDICATIONS  (PRN):  ALPRAZolam 0.25 milliGRAM(s) Oral every 8 hours PRN anxiety  ondansetron Injectable 4 milliGRAM(s) IV Push every 6 hours PRN Nausea and/or Vomiting  morphine  - Injectable 2 milliGRAM(s) IV Push every 4 hours PRN Moderate Pain (4 - 6)  morphine  - Injectable 4 milliGRAM(s) IV Push every 4 hours PRN Severe Pain (7 - 10)      Drug Dosing Weight  Height (cm): 168.91 (26 Jul 2017 06:38)  Weight (kg): 103.1 (26 Jul 2017 06:38)  BMI (kg/m2): 36.1 (26 Jul 2017 06:38)  BSA (m2): 2.12 (26 Jul 2017 06:38)    CENTRAL LINE: [ ] YES [X ] NO  LOCATION:   DATE INSERTED:  REMOVE: [ ] YES [ ] NO  EXPLAIN:    KELLER: [ ] YES [X ] NO    DATE INSERTED:  REMOVE: [ ] YES [ ] NO  EXPLAIN:    A-LINE: [X ] YES [ ] NO  LOCATION:   DATE INSERTED: 7/26  REMOVE: [ ] YES [X ] NO  EXPLAIN: continuos Bp monitoring    PAST MEDICAL & SURGICAL HISTORY:  Anxiety  SCC (squamous cell carcinoma): of mouth  GERD (gastroesophageal reflux disease)  HTN (hypertension)  H/O cone biopsy of cervix      REVIEW OF SYSTEMS    Pertinent findings discussed above.    ICU Vital Signs Last 24 Hrs  T(C): 36.6 (28 Jul 2017 18:01), Max: 37.9 (27 Jul 2017 22:15)  T(F): 97.9 (28 Jul 2017 18:01), Max: 100.2 (27 Jul 2017 22:15)  HR: 92 (28 Jul 2017 18:00) (86 - 108)  BP: 113/64 (28 Jul 2017 18:00) (113/64 - 133/68)  BP(mean): 75 (28 Jul 2017 18:00) (75 - 86)  ABP: 132/65 (28 Jul 2017 15:00) (120/88 - 164/84)  ABP(mean): 90 (28 Jul 2017 15:00) (86 - 116)  RR: 26 (28 Jul 2017 18:00) (12 - 33)  SpO2: 100% (28 Jul 2017 18:00) (96% - 100%)          I&O's Detail    27 Jul 2017 07:01  -  28 Jul 2017 07:00  --------------------------------------------------------  IN:    IV PiggyBack: 300 mL    lactated ringers.: 1900 mL    Osmolite: 360 mL  Total IN: 2560 mL    OUT:    Bulb: 28 mL    Bulb: 65 mL    Indwelling Catheter - Urethral: 410 mL    VAC (Vacuum Assisted Closure) System: 50 mL    Voided: 2800 mL  Total OUT: 3353 mL    Total NET: -793 mL      28 Jul 2017 07:01  -  28 Jul 2017 19:00  --------------------------------------------------------  IN:    IV PiggyBack: 100 mL    lactated ringers.: 100 mL    Osmolite: 140 mL  Total IN: 340 mL    OUT:    Bulb: 10 mL    Bulb: 5 mL    VAC (Vacuum Assisted Closure) System: 10 mL    Voided: 1040 mL  Total OUT: 1065 mL    Total NET: -725 mL              Physical exam:  General: NAD  Neuro: awake alert   HEENT:PERRL Neck incision c/d/i + stephen ss, flap site warm to touch and pink, incisions c/d/i + trach site wnl good aeration doppler biphasic   CV: RRR s1, s2 no MRG   Pulm: CTA B/L   Abd: Soft NT nd  : keller in place  ExtVasc: No C/C/E Ext wwp + dp + LLE fib donor site with ace wrap good capillary refill to ext toes.+ STSG to Left thigh donor site with tegaderm and minimal healthy/appropriate post op bleeding.    MSK: no joint swelling  Skin: no rash    LABS:  CBC Full  -  ( 28 Jul 2017 05:22 )  WBC Count : 10.6 K/uL  Hemoglobin : 9.6 g/dL  Hematocrit : 31.6 %  Platelet Count - Automated : 136 K/uL  Mean Cell Volume : 84.3 fL  Mean Cell Hemoglobin : 25.6 pg  Mean Cell Hemoglobin Concentration : 30.4 g/dL      07-28    142  |  108  |  8   ----------------------------<  162<H>  3.5   |  25  |  0.60    Ca    8.1<L>      28 Jul 2017 05:21  Phos  2.5     07-28  Mg     2.1     07-28      PT/INR - ( 28 Jul 2017 05:23 )   PT: 16.6 sec;   INR: 1.48          PTT - ( 28 Jul 2017 05:23 )  PTT:33.5 sec      RADIOLOGY & ADDITIONAL STUDIES:    CRITICAL CARE TIME SPENT:

## 2017-07-28 NOTE — PHYSICAL THERAPY INITIAL EVALUATION ADULT - NS ASR WT BEARING DETAIL LLE
Patient maintained (L)LE NWB precautions throughout treatment; patient will be WBAT with CAM BOOT ONLY; discussed with ENT, CAM boot is in process of being ordered./nonweight-bearing

## 2017-07-28 NOTE — PROGRESS NOTE ADULT - SUBJECTIVE AND OBJECTIVE BOX
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 Weaning sedation overnight, remained on vent. Flap checks wnl.   POD1: LEXIE overnight. AFVSS. Flap checks wnl. Weaned to TC.   7/27: LEXIE overnight. AFVSS. Flap checks wnl. Tolerating TF.     Vital Signs Last 24 Hrs  T(C): 37.1 (28 Jul 2017 06:28), Max: 38 (27 Jul 2017 17:59)  T(F): 98.7 (28 Jul 2017 06:28), Max: 100.4 (27 Jul 2017 17:59)  HR: 86 (28 Jul 2017 07:00) (86 - 108)  BP: --  BP(mean): --  RR: 14 (28 Jul 2017 07:00) (10 - 33)  SpO2: 100% (28 Jul 2017 07:00) (97% - 100%)    PE:  6-0 cuffed shiley with cuff up sutures x4,cuff down. 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 Weaning sedation overnight, remained on vent. Flap checks wnl.   POD1: LEXIE overnight. AFVSS. Flap checks wnl. Weaned to TC.   7/27: LEXIE overnight. AFVSS. Flap checks wnl. Tolerating TF.     Vital Signs Last 24 Hrs  T(C): 37.1 (28 Jul 2017 06:28), Max: 38 (27 Jul 2017 17:59)  T(F): 98.7 (28 Jul 2017 06:28), Max: 100.4 (27 Jul 2017 17:59)  HR: 86 (28 Jul 2017 07:00) (86 - 108)  BP: --  BP(mean): --  RR: 14 (28 Jul 2017 07:00) (10 - 33)  SpO2: 100% (28 Jul 2017 07:00) (97% - 100%)    PE:  6-0 cuffed shiley with cuff up sutures x4,cuff down. 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 Q2 from 7pm 7/28  - OOBTC today w camboot  -FWB per plastics to clarify w Dr Lerman - aline out   - Peridex TID  - NGT confirmed by CXR  - TF to goal today  - resume home meds  - IVF and pain management per SICU  - monitoring STEPHEN output, drain care  -DVT ppx: SCDs, lovenox 40 daily     Please page ENT with any questions or issues    Dispo: SICU

## 2017-07-28 NOTE — PHYSICAL THERAPY INITIAL EVALUATION ADULT - GAIT DEVIATIONS NOTED, PT EVAL
decreased velocity of limb motion/decreased step length/decreased weight-shifting ability/fairly steady, no LOB, increased manual cueing required for RW to assist with turning to approach chair/decreased rocio

## 2017-07-28 NOTE — PHYSICAL THERAPY INITIAL EVALUATION ADULT - PRECAUTIONS/LIMITATIONS, REHAB EVAL
fall precautions/Educated/reviewed surgical precautions with patient (maintain neutral neck position with functional transfers/ambulation). Patient verbalized understanding./oxygen therapy device and L/min/surgical precautions/obesity precautions

## 2017-07-28 NOTE — PROGRESS NOTE ADULT - ASSESSMENT
47F s/p right mandibulectomy, tracheostomy, and reconstruction with left free fibula flap. POD 2.  >> Continue q1H flap checks for now  >> Keep head in neutral position  >> Given patient's response to IV Dilaudid (significant nausea and dizziness) last night, should consider IV Tylenol and IV Toradol for pain management  >> HOB elevation  >> LLE elevation  >> Titrate IVF to UOP  >> DVT prophylaxis  >> OOBTC today  >> PT consult for assistance with mobility and to give patient CAM boot.   >> Will leave left thigh dressing (Tegaderm) in place for x10 days total  >> Continue VAC for total x6-7 days  >> LAWRENCE care  >> Continue TF  >> Plan to stay in SICU for now 47F s/p right mandibulectomy, tracheostomy, and reconstruction with left free fibula flap. POD 2.  >> Continue q1H flap checks for now  >> Keep head in neutral position  >> Given patient's response to IV Dilaudid (significant nausea and dizziness) last night, should consider IV Tylenol and IV Toradol for pain management  >> HOB elevation  >> LLE elevation  >> Titrate IVF to UOP  >> DVT prophylaxis  >> OOBTC today  >> PT consult for assistance with mobility and to give patient CAM boot.  >> Patient MUST wear CAM boot on LLE when ambulating and is full weight bearing on RLE; patient may remove CAM boot when not ambulating  >> Will leave left thigh dressing (Tegaderm) in place for x10 days total  >> Continue VAC for total x6-7 days  >> LAWRENCE care  >> Continue TF  >> Plan to stay in SICU for now

## 2017-07-28 NOTE — PROGRESS NOTE ADULT - SUBJECTIVE AND OBJECTIVE BOX
SUBJECTIVE:  Doing well.   No overnight events.   Pain moderately well controlled overnight. Had an episode of nausea and dizziness after receiving IV dilaudid early last evening. No emesis.     OBJECTIVE:     ** VITAL SIGNS / I&O's **    T(C): 37.1 (07-28-17 @ 06:28), Max: 38 (07-27-17 @ 17:59)  T(F): 98.7 (07-28-17 @ 06:28), Max: 100.4 (07-27-17 @ 17:59)  HR: 92 (07-28-17 @ 05:00) (86 - 108)  BP: --  ABP: 142/72 (07-28-17 @ 05:00) (120/88 - 178/84)  ABP(mean): 96 (07-28-17 @ 05:00) (88 - 132)  RR: 16 (07-28-17 @ 05:00) (10 - 33)  SpO2: 98% (07-28-17 @ 05:00) (97% - 100%)        26 Jul 2017 07:01  -  27 Jul 2017 07:00  --------------------------------------------------------  IN:    fentaNYL  Infusion: 110 mL    IV PiggyBack: 500 mL    lactated ringers.: 1000 mL    propofol Infusion: 275 mL  Total IN: 1885 mL    OUT:    Bulb: 70 mL    Indwelling Catheter - Urethral: 1145 mL  Total OUT: 1215 mL    Total NET: 670 mL      27 Jul 2017 07:01  -  28 Jul 2017 06:49  --------------------------------------------------------  IN:    IV PiggyBack: 150 mL    lactated ringers.: 1700 mL    Osmolite: 280 mL  Total IN: 2130 mL    OUT:    Bulb: 18 mL    Bulb: 55 mL    Indwelling Catheter - Urethral: 410 mL    VAC (Vacuum Assisted Closure) System: 50 mL    Voided: 2250 mL  Total OUT: 2783 mL    Total NET: -653 mL          ** PHYSICAL EXAM **    -- CONSTITUTIONAL: Awake. Alert. Oriented x3. Cooperative. Pleasant.  -- HEENT & NECK: Right mid and lower face swelling extending down to the neck. Moderate ecchymosis on anterior and lateral neck, stable. No evidence of collections. LAWRENCE serosanguinous.   -- FLAP: Softly edematous.  Pink with 2-3 second capillary refill. (+) arterial Cook Doppler signal.   -- CARDIOVASCULAR: Regular rate and rhythm. S1, S2.  -- RESPIRATORY: Bilateral breath sounds.   -- ABDOMEN: Soft, nontender, nondistended.  -- EXTREMITIES: Left thigh skin graft donor site with overlying tegaderm. Appears healthy without evidence of infection. Left lower leg with VAC in place, holding suction. ACE wrap in place. LAWRENCE serosanguinous. SILT and capillary refill 3 seconds at distal digital pads and nailbeds. FHL/EHL, FDL/EDL, TA/TP intact.     ** LABS **                           9.6    10.6  )-----------( 136      ( 28 Jul 2017 05:22 )             31.6     28 Jul 2017 05:21    142    |  108    |  8      ----------------------------<  162    3.5     |  25     |  0.60     Ca    8.1        28 Jul 2017 05:21  Phos  2.5       28 Jul 2017 05:21  Mg     2.1       28 Jul 2017 05:21        PT/INR - ( 28 Jul 2017 05:23 )   PT: 16.6 sec;   INR: 1.48          PTT - ( 28 Jul 2017 05:23 )  PTT:33.5 sec  CAPILLARY BLOOD GLUCOSE  106 (27 Jul 2017 22:00)  103 (27 Jul 2017 17:00)  100 (27 Jul 2017 11:00)

## 2017-07-28 NOTE — PHYSICAL THERAPY INITIAL EVALUATION ADULT - DID THE PATIENT HAVE SURGERY?
Tracheostomy; (R) neck dissection, reconstruction plate placement; extraction of teeth implant placement; composite resection with (R) mandibulectomy; free flap (L) fibula skin graft/yes

## 2017-07-28 NOTE — PHYSICAL THERAPY INITIAL EVALUATION ADULT - MANUAL MUSCLE TESTING RESULTS, REHAB EVAL
Grossly assessed with functional movement, bilateral UE and (R)LE greater than or equal to 3+/5. (L)LE not assessed 2/2 surgical leg

## 2017-07-29 DIAGNOSIS — C44.92 SQUAMOUS CELL CARCINOMA OF SKIN, UNSPECIFIED: ICD-10-CM

## 2017-07-29 DIAGNOSIS — I10 ESSENTIAL (PRIMARY) HYPERTENSION: ICD-10-CM

## 2017-07-29 DIAGNOSIS — K21.9 GASTRO-ESOPHAGEAL REFLUX DISEASE WITHOUT ESOPHAGITIS: ICD-10-CM

## 2017-07-29 LAB
ANION GAP SERPL CALC-SCNC: 9 MMOL/L — SIGNIFICANT CHANGE UP (ref 5–17)
BUN SERPL-MCNC: 12 MG/DL — SIGNIFICANT CHANGE UP (ref 7–23)
CALCIUM SERPL-MCNC: 8.6 MG/DL — SIGNIFICANT CHANGE UP (ref 8.4–10.5)
CHLORIDE SERPL-SCNC: 106 MMOL/L — SIGNIFICANT CHANGE UP (ref 96–108)
CO2 SERPL-SCNC: 27 MMOL/L — SIGNIFICANT CHANGE UP (ref 22–31)
CREAT SERPL-MCNC: 0.6 MG/DL — SIGNIFICANT CHANGE UP (ref 0.5–1.3)
GLUCOSE SERPL-MCNC: 124 MG/DL — HIGH (ref 70–99)
HCT VFR BLD CALC: 30.7 % — LOW (ref 34.5–45)
HGB BLD-MCNC: 9.3 G/DL — LOW (ref 11.5–15.5)
MAGNESIUM SERPL-MCNC: 2.2 MG/DL — SIGNIFICANT CHANGE UP (ref 1.6–2.6)
MCHC RBC-ENTMCNC: 25.6 PG — LOW (ref 27–34)
MCHC RBC-ENTMCNC: 30.3 G/DL — LOW (ref 32–36)
MCV RBC AUTO: 84.6 FL — SIGNIFICANT CHANGE UP (ref 80–100)
PHOSPHATE SERPL-MCNC: 2.7 MG/DL — SIGNIFICANT CHANGE UP (ref 2.5–4.5)
PLATELET # BLD AUTO: 149 K/UL — LOW (ref 150–400)
POTASSIUM SERPL-MCNC: 3.8 MMOL/L — SIGNIFICANT CHANGE UP (ref 3.5–5.3)
POTASSIUM SERPL-SCNC: 3.8 MMOL/L — SIGNIFICANT CHANGE UP (ref 3.5–5.3)
RBC # BLD: 3.63 M/UL — LOW (ref 3.8–5.2)
RBC # FLD: 15.6 % — SIGNIFICANT CHANGE UP (ref 10.3–16.9)
SODIUM SERPL-SCNC: 142 MMOL/L — SIGNIFICANT CHANGE UP (ref 135–145)
WBC # BLD: 8.6 K/UL — SIGNIFICANT CHANGE UP (ref 3.8–10.5)
WBC # FLD AUTO: 8.6 K/UL — SIGNIFICANT CHANGE UP (ref 3.8–10.5)

## 2017-07-29 RX ORDER — SODIUM,POTASSIUM PHOSPHATES 278-250MG
2 POWDER IN PACKET (EA) ORAL ONCE
Qty: 0 | Refills: 0 | Status: COMPLETED | OUTPATIENT
Start: 2017-07-29 | End: 2017-07-29

## 2017-07-29 RX ORDER — POTASSIUM CHLORIDE 20 MEQ
20 PACKET (EA) ORAL ONCE
Qty: 0 | Refills: 0 | Status: COMPLETED | OUTPATIENT
Start: 2017-07-29 | End: 2017-07-29

## 2017-07-29 RX ADMIN — Medication 20 MILLIEQUIVALENT(S): at 10:40

## 2017-07-29 RX ADMIN — LOSARTAN POTASSIUM 50 MILLIGRAM(S): 100 TABLET, FILM COATED ORAL at 06:07

## 2017-07-29 RX ADMIN — Medication 100 MILLIGRAM(S): at 21:55

## 2017-07-29 RX ADMIN — Medication 100 MILLIGRAM(S): at 21:54

## 2017-07-29 RX ADMIN — MORPHINE SULFATE 4 MILLIGRAM(S): 50 CAPSULE, EXTENDED RELEASE ORAL at 15:05

## 2017-07-29 RX ADMIN — MORPHINE SULFATE 2 MILLIGRAM(S): 50 CAPSULE, EXTENDED RELEASE ORAL at 03:15

## 2017-07-29 RX ADMIN — CHLORHEXIDINE GLUCONATE 15 MILLILITER(S): 213 SOLUTION TOPICAL at 17:57

## 2017-07-29 RX ADMIN — CHLORHEXIDINE GLUCONATE 15 MILLILITER(S): 213 SOLUTION TOPICAL at 06:12

## 2017-07-29 RX ADMIN — MORPHINE SULFATE 2 MILLIGRAM(S): 50 CAPSULE, EXTENDED RELEASE ORAL at 09:27

## 2017-07-29 RX ADMIN — Medication 100 MILLIGRAM(S): at 06:07

## 2017-07-29 RX ADMIN — MORPHINE SULFATE 4 MILLIGRAM(S): 50 CAPSULE, EXTENDED RELEASE ORAL at 21:51

## 2017-07-29 RX ADMIN — Medication 2 TABLET(S): at 10:40

## 2017-07-29 RX ADMIN — ENOXAPARIN SODIUM 40 MILLIGRAM(S): 100 INJECTION SUBCUTANEOUS at 11:30

## 2017-07-29 RX ADMIN — Medication 100 MILLIGRAM(S): at 13:29

## 2017-07-29 RX ADMIN — MORPHINE SULFATE 2 MILLIGRAM(S): 50 CAPSULE, EXTENDED RELEASE ORAL at 09:45

## 2017-07-29 RX ADMIN — MORPHINE SULFATE 2 MILLIGRAM(S): 50 CAPSULE, EXTENDED RELEASE ORAL at 02:44

## 2017-07-29 RX ADMIN — ONDANSETRON 4 MILLIGRAM(S): 8 TABLET, FILM COATED ORAL at 21:59

## 2017-07-29 RX ADMIN — MORPHINE SULFATE 4 MILLIGRAM(S): 50 CAPSULE, EXTENDED RELEASE ORAL at 14:48

## 2017-07-29 RX ADMIN — PANTOPRAZOLE SODIUM 40 MILLIGRAM(S): 20 TABLET, DELAYED RELEASE ORAL at 11:30

## 2017-07-29 NOTE — PROGRESS NOTE ADULT - SUBJECTIVE AND OBJECTIVE BOX
S: Pt reports    Vitals: Vital Signs Last 24 Hrs  T(C): 36.1 (29 Jul 2017 06:04), Max: 36.9 (28 Jul 2017 09:28)  T(F): 97 (29 Jul 2017 06:04), Max: 98.5 (28 Jul 2017 09:28)  HR: 84 (29 Jul 2017 07:00) (82 - 104)  BP: 111/61 (29 Jul 2017 07:00) (96/54 - 133/68)  BP(mean): 81 (29 Jul 2017 07:00) (63 - 94)  RR: 18 (29 Jul 2017 07:00) (14 - 33)  SpO2: 100% (29 Jul 2017 07:00) (96% - 100%)    CAPILLARY BLOOD GLUCOSE  108 (28 Jul 2017 22:00)  120 (28 Jul 2017 17:00)  129 (28 Jul 2017 12:00)        07-28 @ 07:01  -  07-29 @ 07:00  --------------------------------------------------------  IN: 1660 mL / OUT: 1397.5 mL / NET: 262.5 mL            Labs:                         9.3    8.6   )-----------( 149      ( 29 Jul 2017 05:57 )             30.7   07-29    142  |  106  |  12  ----------------------------<  124<H>  3.8   |  27  |  0.60    Ca    8.6      29 Jul 2017 05:57  Phos  2.7     07-29  Mg     2.2     07-29    PT/INR - ( 28 Jul 2017 05:23 )   PT: 16.6 sec;   INR: 1.48          PTT - ( 28 Jul 2017 05:23 )  PTT:33.5 sec    Electrolytes repleated to goals as follows: Potassium 4, Phosphorus 3 and Magnesium 2 where appropriate and necessary    Exam:  Neuro: A&Ox3, NAD, grossly neuro intact  CV: RRR, no MRGs  Pulm: CTAB, no wheezes, rales ronchi  Abd: BS (+), Soft, NT, ND  Ext: No edema peripherally or centrally  Vasc: Extremities warm to palpation, 2+ pulses - radial and PT S: Pt reports pain is well controlled, no CP/SOB/HA.    Vitals: Vital Signs Last 24 Hrs  T(C): 36.1 (29 Jul 2017 06:04), Max: 36.9 (28 Jul 2017 09:28)  T(F): 97 (29 Jul 2017 06:04), Max: 98.5 (28 Jul 2017 09:28)  HR: 84 (29 Jul 2017 07:00) (82 - 104)  BP: 111/61 (29 Jul 2017 07:00) (96/54 - 133/68)  BP(mean): 81 (29 Jul 2017 07:00) (63 - 94)  RR: 18 (29 Jul 2017 07:00) (14 - 33)  SpO2: 100% (29 Jul 2017 07:00) (96% - 100%)    CAPILLARY BLOOD GLUCOSE  108 (28 Jul 2017 22:00)  120 (28 Jul 2017 17:00)  129 (28 Jul 2017 12:00)        07-28 @ 07:01  -  07-29 @ 07:00  --------------------------------------------------------  IN: 1660 mL / OUT: 1397.5 mL / NET: 262.5 mL            Labs:                         9.3    8.6   )-----------( 149      ( 29 Jul 2017 05:57 )             30.7   07-29    142  |  106  |  12  ----------------------------<  124<H>  3.8   |  27  |  0.60    Ca    8.6      29 Jul 2017 05:57  Phos  2.7     07-29  Mg     2.2     07-29    PT/INR - ( 28 Jul 2017 05:23 )   PT: 16.6 sec;   INR: 1.48          PTT - ( 28 Jul 2017 05:23 )  PTT:33.5 sec    Electrolytes repleated to goals as follows: Potassium 4, Phosphorus 3 and Magnesium 2 where appropriate and necessary    Exam:  Neuro: A&Ox3, NAD, grossly neuro intact  CV: RRR, no MRGs  Pulm: CTAB, no wheezes, rales ronchi  Abd: BS (+), Soft, NT, ND  Ext: No edema peripherally or centrally  Vasc: Extremities warm to palpation, 2+ pulses - radial and PT

## 2017-07-29 NOTE — PROGRESS NOTE ADULT - SUBJECTIVE AND OBJECTIVE BOX
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 Weaning sedation overnight, remained on vent. Flap checks wnl.   POD1: LEXIE overnight. AFVSS. Flap checks wnl. Weaned to TC.   POD2: LEXIE overnight. AFVSS. Flap checks wnl. Tolerating TF.   POD3: No acute events, CAM boot delivered, working with PT, TF at goal    Allergies    aspirin (Stomach Upset)    Intolerances        MEDICATIONS:  Antiinfectives:   ceFAZolin   IVPB   IV Intermittent   ceFAZolin   IVPB 1000 milliGRAM(s) IV Intermittent every 8 hours  metroNIDAZOLE  IVPB   IV Intermittent   metroNIDAZOLE  IVPB 500 milliGRAM(s) IV Intermittent every 8 hours    IV fluids:    Hematologic/Anticoagulation:  enoxaparin Injectable 40 milliGRAM(s) SubCutaneous daily    Pain medications/Neuro:  ALPRAZolam 0.25 milliGRAM(s) Oral every 8 hours PRN  ondansetron Injectable 4 milliGRAM(s) IV Push every 6 hours PRN  morphine  - Injectable 2 milliGRAM(s) IV Push every 4 hours PRN  morphine  - Injectable 4 milliGRAM(s) IV Push every 4 hours PRN    Endocrine Medications:   insulin lispro (HumaLOG) corrective regimen sliding scale   SubCutaneous Before meals and at bedtime    All other standing medications:   pantoprazole  Injectable 40 milliGRAM(s) IV Push daily  losartan 50 milliGRAM(s) Oral daily  chlorhexidine 0.12% Liquid 15 milliLiter(s) Swish and Spit two times a day    All other PRN medications:  polyethylene glycol 3350 17 Gram(s) Oral at bedtime PRN      Vital Signs Last 24 Hrs  T(C): 36.6 (2017 10:42), Max: 36.8 (2017 22:16)  T(F): 97.9 (2017 10:42), Max: 98.3 (2017 22:16)  HR: 86 (2017 10:00) (82 - 104)  BP: 115/68 (2017 10:00) (96/54 - 127/60)  BP(mean): 88 (2017 10:00) (63 - 94)  RR: 15 (2017 10:00) (14 - 33)  SpO2: 100% (2017 10:00) (99% - 100%)       @ 07: @ 07:00  --------------------------------------------------------  IN:    Enteral Tube Flush: 50 mL    IV PiggyBack: 250 mL    lactated ringers.: 100 mL    Osmolite: 1260 mL  Total IN: 1660 mL    OUT:    Bulb: 10 mL    Bulb: 17.5 mL    VAC (Vacuum Assisted Closure) System: 10 mL    Voided: 1360 mL  Total OUT: 1397.5 mL    Total NET: 262.5 mL       @ 07: @ 11:26  --------------------------------------------------------  IN:    Osmolite: 195 mL  Total IN: 195 mL    OUT:    Voided: 500 mL  Total OUT: 500 mL    Total NET: -305 mL            PHYSICAL EXAM:  6-0 cuffed shiley with cuff up sutures x4,cuff down. Stay sutures taped to chest  Right neck arterial  +triphasic signal   R neck soft, flat, incision c/d/i, stephen holding suction, minimal ss in bulb  Intra oral flap soft, pink, warm  NGT in place @40  Left thigh skin graft site covered with tegaderm, mild sang pooling underneath  L LE in ace bandage, STEPHEN and Vac holding suction  moving toes, feet warm     STEPHEN neck: 5/10  STEPHEN le.5/17.5  Wound vac: 0/10    LABS:  CBC-                        9.3    8.6   )-----------( 149      ( 2017 05:57 )             30.7     BMP/CMP-  2017 05:57    142    |  106    |  12     ----------------------------<  124    3.8     |  27     |  0.60     Ca    8.6        2017 05:57  Phos  2.7       2017 05:57  Mg     2.2       2017 05:57      Coagulation Studies-  PT/INR - ( 2017 05:23 )   PT: 16.6 sec;   INR: 1.48          PTT - ( 2017 05:23 )  PTT:33.5 sec  Endocrine Panel-  Calcium, Total Serum: 8.6 mg/dL ( @ 05:57)            Assessment/Plan:    47 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 Q2 from 7pm   - OOBTC  -FWB w camboot  - Peridex TID  - continue TF at goal  - resume home meds  - IVF and pain management per SICU  - monitoring STEPHEN output, drain care  -DVT ppx: SCDs, lovenox 40 daily     Please page ENT with any questions or issues    Dispo: SICU

## 2017-07-29 NOTE — PROGRESS NOTE ADULT - ASSESSMENT
47F w/ mandibular SCC now s/p right mandibular mass excision, selective right ND, left fibula reconstruction of right mandible, STSG from left thigh & tracheostomy.     Neuro: xanax prn, tylenol prn, morphine prn, zofran prn  HEENT: flap checks q2hr; arterial doppler monitoring; HOB elevated; head in neutral position; no turning, peridex  CV: MAP >65;  losartan   Pulm: 6-0 cuffed shiley trach collar red rubber suction only to trach  GI/FEN: NPO, Protonix, Dobhoff- osmolite 1.2@65   : voids  ID: Jhoan Zachery (7/26-)  Endo: ISS  Heme: Lovenox   PPx: Lovenox, SCD to RLE  Lines: PIV, A-line  Drains: R neck LAWRENCE to bulb suction; LLE LAWRENCE to bulb suction; Vac to LLE

## 2017-07-29 NOTE — CONSULT NOTE ADULT - SUBJECTIVE AND OBJECTIVE BOX
ICU Vital Signs Last 24 Hrs  T(C): 36.6 (29 Jul 2017 10:42), Max: 36.8 (28 Jul 2017 22:16)  T(F): 97.9 (29 Jul 2017 10:42), Max: 98.3 (28 Jul 2017 22:16)  HR: 86 (29 Jul 2017 10:00) (82 - 104)  BP: 115/68 (29 Jul 2017 10:00) (96/54 - 127/60)  BP(mean): 88 (29 Jul 2017 10:00) (63 - 94)  ABP: 132/65 (28 Jul 2017 15:00) (128/62 - 140/64)  ABP(mean): 90 (28 Jul 2017 15:00) (86 - 90)  RR: 15 (29 Jul 2017 10:00) (14 - 33)  SpO2: 100% (29 Jul 2017 10:00) (99% - 100%)    I&O's Detail    28 Jul 2017 07:01  -  29 Jul 2017 07:00  --------------------------------------------------------  IN:    Enteral Tube Flush: 50 mL    IV PiggyBack: 250 mL    lactated ringers.: 100 mL    Osmolite: 1260 mL  Total IN: 1660 mL    OUT:    Bulb: 10 mL    Bulb: 17.5 mL    VAC (Vacuum Assisted Closure) System: 10 mL    Voided: 1360 mL  Total OUT: 1397.5 mL    Total NET: 262.5 mL      29 Jul 2017 07:01  -  29 Jul 2017 11:14  --------------------------------------------------------  IN:    Osmolite: 195 mL  Total IN: 195 mL    OUT:    Voided: 500 mL  Total OUT: 500 mL    Total NET: -305 mL                 CBC Full  -  ( 29 Jul 2017 05:57 )  WBC Count : 8.6 K/uL  Hemoglobin : 9.3 g/dL  Hematocrit : 30.7 %  Platelet Count - Automated : 149 K/uL  Mean Cell Volume : 84.6 fL  Mean Cell Hemoglobin : 25.6 pg  Mean Cell Hemoglobin Concentration : 30.3 g/dL  Auto Neutrophil # : x  Auto Lymphocyte # : x  Auto Monocyte # : x  Auto Eosinophil # : x  Auto Basophil # : x  Auto Neutrophil % : x  Auto Lymphocyte % : x  Auto Monocyte % : x  Auto Eosinophil % : x  Auto Basophil % : x      07-29    142  |  106  |  12  ----------------------------<  124<H>  3.8   |  27  |  0.60    Ca    8.6      29 Jul 2017 05:57  Phos  2.7     07-29  Mg     2.2     07-29      Patient AAAO, communicate through writing on a whiteboard. denies any pain. no bowel movement et, but do pass george, she's receiving Miralax.   on exam: flap is viabale, pink, soft, cook doppler arterial signals are good, no dehiscence is observed on the flap borders, neck scar is closed w/o infection signs.   leg: VAC on for 125mmHg continuous.  Plans: weight bearing tomorrow, otherwise continue current plan

## 2017-07-30 LAB
ANION GAP SERPL CALC-SCNC: 12 MMOL/L — SIGNIFICANT CHANGE UP (ref 5–17)
BUN SERPL-MCNC: 12 MG/DL — SIGNIFICANT CHANGE UP (ref 7–23)
CALCIUM SERPL-MCNC: 8.6 MG/DL — SIGNIFICANT CHANGE UP (ref 8.4–10.5)
CHLORIDE SERPL-SCNC: 104 MMOL/L — SIGNIFICANT CHANGE UP (ref 96–108)
CO2 SERPL-SCNC: 26 MMOL/L — SIGNIFICANT CHANGE UP (ref 22–31)
CREAT SERPL-MCNC: 0.5 MG/DL — SIGNIFICANT CHANGE UP (ref 0.5–1.3)
GLUCOSE SERPL-MCNC: 105 MG/DL — HIGH (ref 70–99)
HCT VFR BLD CALC: 32.3 % — LOW (ref 34.5–45)
HGB BLD-MCNC: 10 G/DL — LOW (ref 11.5–15.5)
MAGNESIUM SERPL-MCNC: 1.9 MG/DL — SIGNIFICANT CHANGE UP (ref 1.6–2.6)
MCHC RBC-ENTMCNC: 25.4 PG — LOW (ref 27–34)
MCHC RBC-ENTMCNC: 31 G/DL — LOW (ref 32–36)
MCV RBC AUTO: 82 FL — SIGNIFICANT CHANGE UP (ref 80–100)
PHOSPHATE SERPL-MCNC: 2.6 MG/DL — SIGNIFICANT CHANGE UP (ref 2.5–4.5)
PLATELET # BLD AUTO: 189 K/UL — SIGNIFICANT CHANGE UP (ref 150–400)
POTASSIUM SERPL-MCNC: 3.9 MMOL/L — SIGNIFICANT CHANGE UP (ref 3.5–5.3)
POTASSIUM SERPL-SCNC: 3.9 MMOL/L — SIGNIFICANT CHANGE UP (ref 3.5–5.3)
RBC # BLD: 3.94 M/UL — SIGNIFICANT CHANGE UP (ref 3.8–5.2)
RBC # FLD: 15.7 % — SIGNIFICANT CHANGE UP (ref 10.3–16.9)
SODIUM SERPL-SCNC: 142 MMOL/L — SIGNIFICANT CHANGE UP (ref 135–145)
WBC # BLD: 7.8 K/UL — SIGNIFICANT CHANGE UP (ref 3.8–10.5)
WBC # FLD AUTO: 7.8 K/UL — SIGNIFICANT CHANGE UP (ref 3.8–10.5)

## 2017-07-30 RX ORDER — OXYCODONE HYDROCHLORIDE 5 MG/1
5 TABLET ORAL EVERY 4 HOURS
Qty: 0 | Refills: 0 | Status: DISCONTINUED | OUTPATIENT
Start: 2017-07-30 | End: 2017-08-01

## 2017-07-30 RX ORDER — ACETAMINOPHEN 500 MG
650 TABLET ORAL EVERY 6 HOURS
Qty: 0 | Refills: 0 | Status: DISCONTINUED | OUTPATIENT
Start: 2017-07-30 | End: 2017-08-03

## 2017-07-30 RX ADMIN — PANTOPRAZOLE SODIUM 40 MILLIGRAM(S): 20 TABLET, DELAYED RELEASE ORAL at 11:38

## 2017-07-30 RX ADMIN — Medication 100 MILLIGRAM(S): at 23:14

## 2017-07-30 RX ADMIN — ENOXAPARIN SODIUM 40 MILLIGRAM(S): 100 INJECTION SUBCUTANEOUS at 11:38

## 2017-07-30 RX ADMIN — Medication 650 MILLIGRAM(S): at 13:45

## 2017-07-30 RX ADMIN — CHLORHEXIDINE GLUCONATE 15 MILLILITER(S): 213 SOLUTION TOPICAL at 18:19

## 2017-07-30 RX ADMIN — Medication 63.75 MILLIMOLE(S): at 21:16

## 2017-07-30 RX ADMIN — Medication 100 MILLIGRAM(S): at 06:07

## 2017-07-30 RX ADMIN — Medication 100 MILLIGRAM(S): at 13:45

## 2017-07-30 RX ADMIN — Medication 100 MILLIGRAM(S): at 22:32

## 2017-07-30 RX ADMIN — CHLORHEXIDINE GLUCONATE 15 MILLILITER(S): 213 SOLUTION TOPICAL at 07:07

## 2017-07-30 RX ADMIN — Medication 100 MILLIGRAM(S): at 14:30

## 2017-07-30 RX ADMIN — Medication 650 MILLIGRAM(S): at 14:38

## 2017-07-30 RX ADMIN — LOSARTAN POTASSIUM 50 MILLIGRAM(S): 100 TABLET, FILM COATED ORAL at 07:07

## 2017-07-30 NOTE — PROGRESS NOTE ADULT - SUBJECTIVE AND OBJECTIVE BOX
07-30-17 @ 19:29            [facility] [id number]    T(C): 37.3 (07-30-17 @ 17:00), Max: 37.7 (07-30-17 @ 06:00)  HR: 99 (07-30-17 @ 12:32) (88 - 100)  BP: 149/69 (07-30-17 @ 11:47) (121/67 - 149/69)  RR: 18 (07-30-17 @ 11:47) (17 - 18)  SpO2: 96% (07-30-17 @ 12:32) (96% - 99%)    07-29-17 @ 07:01  -  07-30-17 @ 07:00  --------------------------------------------------------  IN:    Osmolite: 845 mL  Total IN: 845 mL    OUT:    Bulb: 9 mL    Bulb: 9 mL    VAC (Vacuum Assisted Closure) System: 40 mL    Voided: 1320 mL  Total OUT: 1378 mL    Total NET: -533 mL      07-30-17 @ 07:01  -  07-30-17 @ 19:29  --------------------------------------------------------  IN:    Enteral Tube Flush: 430 mL    Osmolite: 715 mL  Total IN: 1145 mL    OUT:    Voided: 500 mL  Total OUT: 500 mL    Total NET: 645 mL        10.0<L> [11.5 - 15.5]  32.3<L> [34.5 - 45.0]  82.0 [80.0 - 100.0]  189 [150 - 400]  7.8 [3.8 - 10.5]  --  --  --  --  --    Update: after getting a call from ENT resident at 17:30 updating that following the patient's PT there is no cook doppler sign present. Dr Lerman was updated. examining the patient bedside, No signal was obtained. a good signal was found using hendheld doppler intraorally. prick test demonstrated bleeding of the flap. nurse was instructed and updated to check the flap using the hend held doppler q2.   the patient was updated on her general status and on flap viability.  ENT resident was educated on flap check using hendheld doppler and prick test.

## 2017-07-30 NOTE — PROGRESS NOTE ADULT - SUBJECTIVE AND OBJECTIVE BOX
07-30-17 @ 09:03            [facility] [id number]    T(C): 37.2 (07-30-17 @ 09:00), Max: 37.7 (07-30-17 @ 06:00)  HR: 88 (07-30-17 @ 05:05) (84 - 94)  BP: 128/75 (07-30-17 @ 05:05) (103/59 - 131/73)  RR: 17 (07-30-17 @ 05:05) (8 - 20)  SpO2: 97% (07-30-17 @ 05:05) (96% - 100%)    07-29-17 @ 07:01  -  07-30-17 @ 07:00  --------------------------------------------------------  IN:    Osmolite: 845 mL  Total IN: 845 mL    OUT:    Bulb: 9 mL    Bulb: 9 mL    VAC (Vacuum Assisted Closure) System: 40 mL    Voided: 1320 mL  Total OUT: 1378 mL    Total NET: -533 mL              patient is well, reports no pain. she reports hot flashes during the day and night. yesterday was planned for OOB but did not. OOB planned for today. thigh donor site for STSG ok, VAC dressing for STSG holds. cook doppler signs good.  exam: scars w/o infection signs, drains were removed (Dr lerman confirmation).     Plan: VAC removal tomorrow, OOB,

## 2017-07-30 NOTE — PROGRESS NOTE ADULT - SUBJECTIVE AND OBJECTIVE BOX
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 Weaning sedation overnight, remained on vent. Flap checks wnl.   POD1: LEXIE overnight. AFVSS. Flap checks wnl. Weaned to TC.   POD2: LEXIE overnight. AFVSS. Flap checks wnl. Tolerating TF.   POD3: No acute events, CAM boot delivered, working with PT, TF at goal  POD4: No acute events, TF at goal, JPs removed by plastics          Allergies    aspirin (Stomach Upset)    Intolerances        MEDICATIONS:  Antiinfectives:   ceFAZolin   IVPB   IV Intermittent   ceFAZolin   IVPB 1000 milliGRAM(s) IV Intermittent every 8 hours  metroNIDAZOLE  IVPB   IV Intermittent   metroNIDAZOLE  IVPB 500 milliGRAM(s) IV Intermittent every 8 hours    IV fluids:    Hematologic/Anticoagulation:  enoxaparin Injectable 40 milliGRAM(s) SubCutaneous daily    Pain medications/Neuro:  ALPRAZolam 0.25 milliGRAM(s) Oral every 8 hours PRN  ondansetron Injectable 4 milliGRAM(s) IV Push every 6 hours PRN  morphine  - Injectable 2 milliGRAM(s) IV Push every 4 hours PRN  morphine  - Injectable 4 milliGRAM(s) IV Push every 4 hours PRN    Endocrine Medications:   insulin lispro (HumaLOG) corrective regimen sliding scale   SubCutaneous Before meals and at bedtime    All other standing medications:   pantoprazole  Injectable 40 milliGRAM(s) IV Push daily  losartan 50 milliGRAM(s) Oral daily  chlorhexidine 0.12% Liquid 15 milliLiter(s) Swish and Spit two times a day    All other PRN medications:  polyethylene glycol 3350 17 Gram(s) Oral at bedtime PRN      Vital Signs Last 24 Hrs  T(C): 37.2 (30 Jul 2017 09:00), Max: 37.7 (30 Jul 2017 06:00)  T(F): 98.9 (30 Jul 2017 09:00), Max: 99.9 (30 Jul 2017 06:00)  HR: 88 (30 Jul 2017 05:05) (84 - 94)  BP: 128/75 (30 Jul 2017 05:05) (103/59 - 131/73)  BP(mean): 95 (30 Jul 2017 05:05) (73 - 95)  RR: 17 (30 Jul 2017 05:05) (8 - 18)  SpO2: 97% (30 Jul 2017 05:05) (96% - 100%)      07-29 @ 07:01  -  07-30 @ 07:00  --------------------------------------------------------  IN:    Osmolite: 845 mL  Total IN: 845 mL    OUT:    Bulb: 9 mL    Bulb: 9 mL    VAC (Vacuum Assisted Closure) System: 40 mL    Voided: 1320 mL  Total OUT: 1378 mL    Total NET: -533 mL            PHYSICAL EXAM:  6-0 cuffed shiley with cuff up sutures x4,cuff down. Stay sutures taped to chest  Right neck arterial  +triphasic signal   R neck soft, flat, incision c/d/i  Intra oral flap soft, pink, warm  NGT in place @40  Left thigh skin graft site covered with tegaderm, mild sang pooling underneath  L LE in ace bandage,Vac holding suction  moving toes, feet warm     LABS:  CBC-                        10.0   7.8   )-----------( 189      ( 30 Jul 2017 08:50 )             32.3     BMP/CMP-  30 Jul 2017 08:46    142    |  104    |  12     ----------------------------<  105    3.9     |  26     |  0.50     Ca    8.6        30 Jul 2017 08:46  Phos  2.6       30 Jul 2017 08:46  Mg     1.9       30 Jul 2017 08:46      Coagulation Studies-    Endocrine Panel-  Calcium, Total Serum: 8.6 mg/dL (07-30 @ 08:46)          Assessment/Plan:   47 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 Q2 from 7pm 7/28  - OOBTC  -FWB w camboot  - Peridex TID  - continue TF at goal  - resume home meds  - convert to free water flushes, dc IVF  - pain control  - monitoring LAWRENCE output, drain care  -DVT ppx: SCDs, lovenox 40 daily     Please page ENT with any questions or issues    Dispo: SDU    seen and examined w/ senior resident, plan discussed with attending

## 2017-07-31 ENCOUNTER — TRANSCRIPTION ENCOUNTER (OUTPATIENT)
Age: 47
End: 2017-07-31

## 2017-07-31 LAB
ANION GAP SERPL CALC-SCNC: 11 MMOL/L — SIGNIFICANT CHANGE UP (ref 5–17)
BUN SERPL-MCNC: 14 MG/DL — SIGNIFICANT CHANGE UP (ref 7–23)
CALCIUM SERPL-MCNC: 8.6 MG/DL — SIGNIFICANT CHANGE UP (ref 8.4–10.5)
CHLORIDE SERPL-SCNC: 102 MMOL/L — SIGNIFICANT CHANGE UP (ref 96–108)
CO2 SERPL-SCNC: 26 MMOL/L — SIGNIFICANT CHANGE UP (ref 22–31)
CREAT SERPL-MCNC: 0.6 MG/DL — SIGNIFICANT CHANGE UP (ref 0.5–1.3)
GLUCOSE SERPL-MCNC: 110 MG/DL — HIGH (ref 70–99)
HCT VFR BLD CALC: 32.1 % — LOW (ref 34.5–45)
HGB BLD-MCNC: 10.1 G/DL — LOW (ref 11.5–15.5)
MAGNESIUM SERPL-MCNC: 2.1 MG/DL — SIGNIFICANT CHANGE UP (ref 1.6–2.6)
MCHC RBC-ENTMCNC: 25.6 PG — LOW (ref 27–34)
MCHC RBC-ENTMCNC: 31.5 G/DL — LOW (ref 32–36)
MCV RBC AUTO: 81.3 FL — SIGNIFICANT CHANGE UP (ref 80–100)
PHOSPHATE SERPL-MCNC: 3.8 MG/DL — SIGNIFICANT CHANGE UP (ref 2.5–4.5)
PLATELET # BLD AUTO: 199 K/UL — SIGNIFICANT CHANGE UP (ref 150–400)
POTASSIUM SERPL-MCNC: 3.9 MMOL/L — SIGNIFICANT CHANGE UP (ref 3.5–5.3)
POTASSIUM SERPL-SCNC: 3.9 MMOL/L — SIGNIFICANT CHANGE UP (ref 3.5–5.3)
RBC # BLD: 3.95 M/UL — SIGNIFICANT CHANGE UP (ref 3.8–5.2)
RBC # FLD: 15.7 % — SIGNIFICANT CHANGE UP (ref 10.3–16.9)
SODIUM SERPL-SCNC: 139 MMOL/L — SIGNIFICANT CHANGE UP (ref 135–145)
WBC # BLD: 7.4 K/UL — SIGNIFICANT CHANGE UP (ref 3.8–10.5)
WBC # FLD AUTO: 7.4 K/UL — SIGNIFICANT CHANGE UP (ref 3.8–10.5)

## 2017-07-31 RX ADMIN — Medication 100 MILLIGRAM(S): at 22:15

## 2017-07-31 RX ADMIN — ENOXAPARIN SODIUM 40 MILLIGRAM(S): 100 INJECTION SUBCUTANEOUS at 12:32

## 2017-07-31 RX ADMIN — Medication 100 MILLIGRAM(S): at 08:03

## 2017-07-31 RX ADMIN — CHLORHEXIDINE GLUCONATE 15 MILLILITER(S): 213 SOLUTION TOPICAL at 07:11

## 2017-07-31 RX ADMIN — Medication 100 MILLIGRAM(S): at 13:50

## 2017-07-31 RX ADMIN — Medication 100 MILLIGRAM(S): at 07:11

## 2017-07-31 RX ADMIN — LOSARTAN POTASSIUM 50 MILLIGRAM(S): 100 TABLET, FILM COATED ORAL at 07:12

## 2017-07-31 RX ADMIN — Medication 0.25 MILLIGRAM(S): at 04:42

## 2017-07-31 RX ADMIN — Medication 100 MILLIGRAM(S): at 22:59

## 2017-07-31 RX ADMIN — CHLORHEXIDINE GLUCONATE 15 MILLILITER(S): 213 SOLUTION TOPICAL at 18:13

## 2017-07-31 RX ADMIN — Medication 100 MILLIGRAM(S): at 13:49

## 2017-07-31 RX ADMIN — PANTOPRAZOLE SODIUM 40 MILLIGRAM(S): 20 TABLET, DELAYED RELEASE ORAL at 12:32

## 2017-07-31 NOTE — DISCHARGE NOTE ADULT - CARE PLAN
Principal Discharge DX:	SCC (squamous cell carcinoma)  Goal:	rehabilitation  Instructions for follow-up, activity and diet:	•	Report any fever, chills, difficulty breathing, difficulty swallowing, change in your voice, bleeding or purulent drainage from your incision site, or any significant swelling to the doctor immediately.  •	Diet: NGT feeds- osmolite 1.2 bolus 260 q4h,  q4h  •	Activity: no heavy lifting, do not lift anything heavier than a gallon of milk until after you follow up appointment with your doctor, no strenuous activity such as running or biking.  •	Shower/Bathing: you may shower and wash your hair but do not scrub your neck incision, pat dry. Do not get water into the drain site.  •	Wound care: keep incision clean and dry.   •	Take all medications as prescribed.   •	Continue all of your normal home medications unless told otherwise.  •	Avoid any NSAIDS or ibuprofen/asa containing products you may take Tylenol for mild pain unless told otherwise by your doctor. Principal Discharge DX:	SCC (squamous cell carcinoma)  Goal:	rehabilitation  Instructions for follow-up, activity and diet:	•	Report any fever, chills, difficulty breathing, difficulty swallowing, change in your voice, bleeding or purulent drainage from your incision site, or any significant swelling to the doctor immediately.  •	Diet: NGT feeds- osmolite 1.2 bolus 260 q4h,  q4h  •	Activity: no heavy lifting, do not lift anything heavier than a gallon of milk until after you follow up appointment with your doctor, no strenuous activity such as running or biking.  •	Shower/Bathing: you may shower and wash your hair but do not scrub your neck incision, pat dry. Do not get water into the drain site.  •	Wound care: keep incision clean and dry.   •	Take all medications as prescribed.   •	Continue all of your normal home medications unless told otherwise.  •	Avoid any NSAIDS or ibuprofen/asa containing products you may take Tylenol for mild pain unless told otherwise by your doctor.    aquaphor to STSG donor site as per plastic surgery  Goal:	closure of tracheostomy stoma  Instructions for follow-up, activity and diet:	Stoma Site Care: you stoma trach site should heal over the next several weeks, you can clean the area around the stoma with gauze and saline as needed, replace the gauze square covering your stoma site once a day and as needed, hold in place with paper tape. Be sure to place two fingers firmly over trach site when coughing or talking to help facilitate healing.

## 2017-07-31 NOTE — DISCHARGE NOTE ADULT - PLAN OF CARE
rehabilitation •	Report any fever, chills, difficulty breathing, difficulty swallowing, change in your voice, bleeding or purulent drainage from your incision site, or any significant swelling to the doctor immediately.  •	Diet: NGT feeds- osmolite 1.2 bolus 260 q4h,  q4h  •	Activity: no heavy lifting, do not lift anything heavier than a gallon of milk until after you follow up appointment with your doctor, no strenuous activity such as running or biking.  •	Shower/Bathing: you may shower and wash your hair but do not scrub your neck incision, pat dry. Do not get water into the drain site.  •	Wound care: keep incision clean and dry.   •	Take all medications as prescribed.   •	Continue all of your normal home medications unless told otherwise.  •	Avoid any NSAIDS or ibuprofen/asa containing products you may take Tylenol for mild pain unless told otherwise by your doctor. closure of tracheostomy stoma Stoma Site Care: you stoma trach site should heal over the next several weeks, you can clean the area around the stoma with gauze and saline as needed, replace the gauze square covering your stoma site once a day and as needed, hold in place with paper tape. Be sure to place two fingers firmly over trach site when coughing or talking to help facilitate healing. •	Report any fever, chills, difficulty breathing, difficulty swallowing, change in your voice, bleeding or purulent drainage from your incision site, or any significant swelling to the doctor immediately.  •	Diet: NGT feeds- osmolite 1.2 bolus 260 q4h,  q4h  •	Activity: no heavy lifting, do not lift anything heavier than a gallon of milk until after you follow up appointment with your doctor, no strenuous activity such as running or biking.  •	Shower/Bathing: you may shower and wash your hair but do not scrub your neck incision, pat dry. Do not get water into the drain site.  •	Wound care: keep incision clean and dry.   •	Take all medications as prescribed.   •	Continue all of your normal home medications unless told otherwise.  •	Avoid any NSAIDS or ibuprofen/asa containing products you may take Tylenol for mild pain unless told otherwise by your doctor.    aquaphor to STSG donor site as per plastic surgery

## 2017-07-31 NOTE — PROGRESS NOTE ADULT - SUBJECTIVE AND OBJECTIVE BOX
SUBJECTIVE:  Doing well.   No overnight events.   OOBTC.    OBJECTIVE:     ** VITAL SIGNS / I&O's **    T(C): 37.6 (07-31-17 @ 06:36), Max: 37.6 (07-31-17 @ 06:36)  T(F): 99.6 (07-31-17 @ 06:36), Max: 99.6 (07-31-17 @ 06:36)  HR: 86 (07-31-17 @ 05:00) (86 - 100)  BP: 137/73 (07-31-17 @ 05:00) (136/73 - 149/69)  RR: 18 (07-31-17 @ 05:00) (18 - 20)  SpO2: 96% (07-31-17 @ 05:00) (95% - 98%)      30 Jul 2017 07:01  -  31 Jul 2017 07:00  --------------------------------------------------------  IN:    Enteral Tube Flush: 860 mL    IV PiggyBack: 150 mL    Osmolite: 1040 mL  Total IN: 2050 mL    OUT:    Voided: 875 mL  Total OUT: 875 mL    Total NET: 1175 mL          ** PHYSICAL EXAM **    -- CONSTITUTIONAL: Awake. Alert. Oriented x3. Cooperative.   -- HEENT: Right lower face edema, improved slightly. No collections  -- FLAP: Soft, edematous, and pink with 2-3 second capillary refill. (+) arterial pencil Doppler signal.   -- NECK: Right side edema. No collections. Ecchymosis, stable, resolving.  -- CARDIOVASCULAR: Regular rate and rhythm. S1, S2.  -- RESPIRATORY: Bilateral breath sounds.   -- ABDOMEN: Soft, nontender, nondistended.  -- EXTREMITIES: Left thigh dressing in place. Healthy. LLE VAC in place. Capillary refill 3 seconds and SILT at distal digital pads. TA/TP, EDL/FDL, EHL/FHL intact.     ** LABS **                           10.1   7.4   )-----------( 199      ( 31 Jul 2017 06:51 )             32.1       CAPILLARY BLOOD GLUCOSE  107 (30 Jul 2017 22:15)  113 (30 Jul 2017 16:15)  126 (30 Jul 2017 11:00)

## 2017-07-31 NOTE — DISCHARGE NOTE ADULT - PATIENT PORTAL LINK FT
“You can access the [employer] Patient Portal, offered by [facility], by registering with the following website: [email or web address]”

## 2017-07-31 NOTE — DISCHARGE NOTE ADULT - CARE PROVIDERS DIRECT ADDRESSES
,DirectAddress_Unknown,[email or web address]

## 2017-07-31 NOTE — DISCHARGE NOTE ADULT - CARE PROVIDER_API CALL
Gilberto Block; DDS), Dental Medicine; OralMaxillofacial Surgery  [address] [address]  Phone: [phone]  Fax: [phone]    Lerman, Oren Z (MD), Plastic Surgery  [address] [address]  Phone: [phone]  Fax: [phone]

## 2017-07-31 NOTE — DISCHARGE NOTE ADULT - MEDICATION SUMMARY - MEDICATIONS TO TAKE
I will START or STAY ON the medications listed below when I get home from the hospital:    oxyCODONE 5 mg/5 mL oral solution  -- 5 milliliter(s) by mouth every 4 hours, As needed, Moderate Pain (4 - 6) MDD:20  -- Indication: For pain    losartan-hydrochlorothiazide 50mg-12.5mg oral tablet  -- 1 tab(s) by mouth once a day  -- Indication: For HTN (hypertension)    chlorhexidine 0.12% mucous membrane liquid  -- 15 milliliter(s) mucous membrane 2 times a day  -- Indication: For post op I will START or STAY ON the medications listed below when I get home from the hospital:    acetaminophen 160 mg/5 mL oral suspension  -- 640 milliliter(s) by mouth every 6 hours, As Needed -for mild pain  -- Shake well before use.  This product contains acetaminophen.  Do not use  with any other product containing acetaminophen to prevent possible liver damage.    -- Indication: For SCC (squamous cell carcinoma)    oxyCODONE 5 mg/5 mL oral solution  -- 5 milliliter(s) by mouth every 4 hours, As needed, Moderate Pain (4 - 6) MDD:20  -- Indication: For SCC (squamous cell carcinoma)    losartan-hydrochlorothiazide 50mg-12.5mg oral tablet  -- 1 tab(s) by mouth once a day  -- Indication: For HTN (hypertension)    chlorhexidine 0.12% mucous membrane liquid  -- 15 milliliter(s) mucous membrane 2 times a day  -- Indication: For SCC (squamous cell carcinoma)    famotidine 40 mg/5 mL oral liquid  -- 40 milligram(s) by mouth once a day  -- Expires___________________  It is very important that you take or use this exactly as directed.  Do not skip doses or discontinue unless directed by your doctor.  Obtain medical advice before taking any non-prescription drugs as some may affect the action of this medication.    -- Indication: For GERD (gastroesophageal reflux disease)

## 2017-07-31 NOTE — PROGRESS NOTE ADULT - SUBJECTIVE AND OBJECTIVE BOX
ENT [facility] DAILY PROGRESS NOTE      Overnight events/Interval HPI:   47 F s/p Tracheostomy (6-0 shiley), modified right neck dissection, Right segmental mandibulectomy, left fibula MVFF, Left thigh STSG on 7/26 for SCCa    POD0: Transferred directly to SICU from OR. Weaning sedation overnight, remained on vent. Flap checks wnl.   POD1: LEXIE overnight. AFVSS. Flap checks wnl. Weaned to TC.   POD2: LEXIE overnight. AFVSS. Flap checks wnl. Tolerating TF.   POD3: No acute events, CAM boot delivered, working with PT, TF at goal  POD4: No acute events, TF at goal, JPs removed by plastics  POD5: Veebow doppler signal intermittently functioning after removal of LAWRENCE, pin prick performed with healthy bleeding tissue, now performing q2h handheld doppler flap checks, otherwise doing well, pain controlled          Allergies    aspirin (Stomach Upset)    Intolerances        MEDICATIONS:  Antiinfectives:   ceFAZolin   IVPB   IV Intermittent   ceFAZolin   IVPB 1000 milliGRAM(s) IV Intermittent every 8 hours  metroNIDAZOLE  IVPB   IV Intermittent   metroNIDAZOLE  IVPB 500 milliGRAM(s) IV Intermittent every 8 hours    IV fluids:    Hematologic/Anticoagulation:  enoxaparin Injectable 40 milliGRAM(s) SubCutaneous daily    Pain medications/Neuro:  ALPRAZolam 0.25 milliGRAM(s) Oral every 8 hours PRN  ondansetron Injectable 4 milliGRAM(s) IV Push every 6 hours PRN  acetaminophen    Suspension. 650 milliGRAM(s) Oral every 6 hours PRN  oxyCODONE Concentrate 5 milliGRAM(s) Oral every 4 hours PRN    Endocrine Medications:   insulin lispro (HumaLOG) corrective regimen sliding scale   SubCutaneous Before meals and at bedtime    All other standing medications:   pantoprazole  Injectable 40 milliGRAM(s) IV Push daily  losartan 50 milliGRAM(s) Oral daily  chlorhexidine 0.12% Liquid 15 milliLiter(s) Swish and Spit two times a day    All other PRN medications:  polyethylene glycol 3350 17 Gram(s) Oral at bedtime PRN      Vital Signs Last 24 Hrs  T(C): 37.6 (31 Jul 2017 06:36), Max: 37.6 (31 Jul 2017 06:36)  T(F): 99.6 (31 Jul 2017 06:36), Max: 99.6 (31 Jul 2017 06:36)  HR: 96 (31 Jul 2017 08:25) (86 - 100)  BP: 128/76 (31 Jul 2017 08:25) (128/76 - 149/69)  BP(mean): 94 (31 Jul 2017 08:25) (94 - 101)  RR: 19 (31 Jul 2017 08:25) (18 - 20)  SpO2: 95% (31 Jul 2017 08:25) (95% - 98%)      07-30 @ 07:01  -  07-31 @ 07:00  --------------------------------------------------------  IN:    Enteral Tube Flush: 860 mL    IV PiggyBack: 150 mL    Osmolite: 1040 mL  Total IN: 2050 mL    OUT:    Voided: 1275 mL  Total OUT: 1275 mL    Total NET: 775 mL        PHYSICAL EXAM:  6-0 cuffed shiley with cuff down sutures x4,cuff down. Stay sutures taped to chest  R neck soft, flat, incision c/d/i  Intra oral flap soft, pink, warm, strong triphasic signal with handheld doppler  NGT in place @40  Left thigh skin graft site covered with tegaderm, mild sang pooling underneath  L LE in ace bandage,Vac holding suction  moving toes, feet warm     LABS:  CBC-                        10.1   7.4   )-----------( 199      ( 31 Jul 2017 06:51 )             32.1     BMP/CMP-  31 Jul 2017 06:51    139    |  102    |  14     ----------------------------<  110    3.9     |  26     |  0.60     Ca    8.6        31 Jul 2017 06:51  Phos  3.8       31 Jul 2017 06:51  Mg     2.1       31 Jul 2017 06:51      Coagulation Studies-    Endocrine Panel-  Calcium, Total Serum: 8.6 mg/dL (07-31 @ 06:51)          Assessment/Plan:      47 F  s/p Tracheostomy (6-0 shiley), modified right neck dissection, Right segmental mandibulectomy, left fibula MVFF, Left thigh STSG on 7/26 for SCCa  - Continue trach collar   - routine trach care: spare trach at bedside, obturator HOB, use red rubbers only to suction trach, clean inner cannula BID and prn   - HOB elevated, head in neutral position, no turning, NO CIRCUMFERENTIAL NECK TIES  - flap checks per plastics protocol. Q2h w/ hand held doppler  - wound vac to be removed tomorrow  - OOBTC, FWB w camboot  - Peridex TID  - continue TF at goalm will plan for dc home w/ NGT feeds  - resume home meds  - continue free water flushes  - pain control  -DVT ppx: SCDs, lovenox 40 daily     Please page ENT with any questions or issues    Dispo: SDU    seen and examined w/ senior resident, plan discussed with attending

## 2017-08-01 LAB
ANION GAP SERPL CALC-SCNC: 10 MMOL/L — SIGNIFICANT CHANGE UP (ref 5–17)
BUN SERPL-MCNC: 16 MG/DL — SIGNIFICANT CHANGE UP (ref 7–23)
CALCIUM SERPL-MCNC: 8.6 MG/DL — SIGNIFICANT CHANGE UP (ref 8.4–10.5)
CHLORIDE SERPL-SCNC: 101 MMOL/L — SIGNIFICANT CHANGE UP (ref 96–108)
CO2 SERPL-SCNC: 27 MMOL/L — SIGNIFICANT CHANGE UP (ref 22–31)
CREAT SERPL-MCNC: 0.6 MG/DL — SIGNIFICANT CHANGE UP (ref 0.5–1.3)
GLUCOSE SERPL-MCNC: 123 MG/DL — HIGH (ref 70–99)
HCT VFR BLD CALC: 29.3 % — LOW (ref 34.5–45)
HGB BLD-MCNC: 9.1 G/DL — LOW (ref 11.5–15.5)
MAGNESIUM SERPL-MCNC: 2.1 MG/DL — SIGNIFICANT CHANGE UP (ref 1.6–2.6)
MCHC RBC-ENTMCNC: 25.2 PG — LOW (ref 27–34)
MCHC RBC-ENTMCNC: 31.1 G/DL — LOW (ref 32–36)
MCV RBC AUTO: 81.2 FL — SIGNIFICANT CHANGE UP (ref 80–100)
PHOSPHATE SERPL-MCNC: 4.2 MG/DL — SIGNIFICANT CHANGE UP (ref 2.5–4.5)
PLATELET # BLD AUTO: 187 K/UL — SIGNIFICANT CHANGE UP (ref 150–400)
POTASSIUM SERPL-MCNC: 3.8 MMOL/L — SIGNIFICANT CHANGE UP (ref 3.5–5.3)
POTASSIUM SERPL-SCNC: 3.8 MMOL/L — SIGNIFICANT CHANGE UP (ref 3.5–5.3)
RBC # BLD: 3.61 M/UL — LOW (ref 3.8–5.2)
RBC # FLD: 15.8 % — SIGNIFICANT CHANGE UP (ref 10.3–16.9)
SODIUM SERPL-SCNC: 138 MMOL/L — SIGNIFICANT CHANGE UP (ref 135–145)
WBC # BLD: 7.2 K/UL — SIGNIFICANT CHANGE UP (ref 3.8–10.5)
WBC # FLD AUTO: 7.2 K/UL — SIGNIFICANT CHANGE UP (ref 3.8–10.5)

## 2017-08-01 RX ORDER — OXYCODONE HYDROCHLORIDE 5 MG/1
5 TABLET ORAL EVERY 4 HOURS
Qty: 0 | Refills: 0 | Status: DISCONTINUED | OUTPATIENT
Start: 2017-08-01 | End: 2017-08-03

## 2017-08-01 RX ORDER — BACITRACIN ZINC 500 UNIT/G
1 OINTMENT IN PACKET (EA) TOPICAL
Qty: 0 | Refills: 0 | Status: DISCONTINUED | OUTPATIENT
Start: 2017-08-01 | End: 2017-08-03

## 2017-08-01 RX ADMIN — Medication 100 MILLIGRAM(S): at 14:39

## 2017-08-01 RX ADMIN — Medication 100 MILLIGRAM(S): at 22:13

## 2017-08-01 RX ADMIN — Medication 650 MILLIGRAM(S): at 01:11

## 2017-08-01 RX ADMIN — Medication 100 MILLIGRAM(S): at 07:13

## 2017-08-01 RX ADMIN — Medication 1 APPLICATION(S): at 18:12

## 2017-08-01 RX ADMIN — Medication 650 MILLIGRAM(S): at 18:11

## 2017-08-01 RX ADMIN — Medication 100 MILLIGRAM(S): at 06:21

## 2017-08-01 RX ADMIN — LOSARTAN POTASSIUM 50 MILLIGRAM(S): 100 TABLET, FILM COATED ORAL at 06:21

## 2017-08-01 RX ADMIN — Medication 100 MILLIGRAM(S): at 23:15

## 2017-08-01 RX ADMIN — ENOXAPARIN SODIUM 40 MILLIGRAM(S): 100 INJECTION SUBCUTANEOUS at 12:28

## 2017-08-01 RX ADMIN — CHLORHEXIDINE GLUCONATE 15 MILLILITER(S): 213 SOLUTION TOPICAL at 18:11

## 2017-08-01 RX ADMIN — POLYETHYLENE GLYCOL 3350 17 GRAM(S): 17 POWDER, FOR SOLUTION ORAL at 22:13

## 2017-08-01 RX ADMIN — CHLORHEXIDINE GLUCONATE 15 MILLILITER(S): 213 SOLUTION TOPICAL at 06:21

## 2017-08-01 RX ADMIN — PANTOPRAZOLE SODIUM 40 MILLIGRAM(S): 20 TABLET, DELAYED RELEASE ORAL at 12:28

## 2017-08-01 RX ADMIN — Medication 650 MILLIGRAM(S): at 02:10

## 2017-08-01 NOTE — PROGRESS NOTE ADULT - SUBJECTIVE AND OBJECTIVE BOX
ENT [facility] DAILY PROGRESS NOTE    Overnight events/Interval HPI:  47 F s/p Tracheostomy (6-0 shiley), modified right neck dissection, Right segmental mandibulectomy, left fibula MVFF, Left thigh STSG on 7/26 for SCCa    POD0: Transferred directly to SICU from OR. Weaning sedation overnight, remained on vent. Flap checks wnl.   POD1: LEXIE overnight. AFVSS. Flap checks wnl. Weaned to TC.   POD2: LEXIE overnight. AFVSS. Flap checks wnl. Tolerating TF.   POD3: No acute events, CAM boot delivered, working with PT, TF at goal  POD4: No acute events, TF at goal, JPs removed by plastics  POD5: Vigilix doppler signal intermittently functioning after removal of LAWRENCE, pin prick performed with healthy bleeding tissue, now performing q2h handheld doppler flap checks, otherwise doing well, pain controlled  POD6: tracheostomy changed to 6-0 cuffless yesterday, walking with PT, Vigilix doppler removed        Allergies    aspirin (Stomach Upset)    Intolerances        MEDICATIONS:  Antiinfectives:   ceFAZolin   IVPB   IV Intermittent   ceFAZolin   IVPB 1000 milliGRAM(s) IV Intermittent every 8 hours  metroNIDAZOLE  IVPB   IV Intermittent   metroNIDAZOLE  IVPB 500 milliGRAM(s) IV Intermittent every 8 hours    IV fluids:    Hematologic/Anticoagulation:  enoxaparin Injectable 40 milliGRAM(s) SubCutaneous daily    Pain medications/Neuro:  ALPRAZolam 0.25 milliGRAM(s) Oral every 8 hours PRN  ondansetron Injectable 4 milliGRAM(s) IV Push every 6 hours PRN  acetaminophen    Suspension. 650 milliGRAM(s) Oral every 6 hours PRN  oxyCODONE Concentrate 5 milliGRAM(s) Oral every 4 hours PRN    Endocrine Medications:   insulin lispro (HumaLOG) corrective regimen sliding scale   SubCutaneous Before meals and at bedtime    All other standing medications:   pantoprazole  Injectable 40 milliGRAM(s) IV Push daily  losartan 50 milliGRAM(s) Oral daily  chlorhexidine 0.12% Liquid 15 milliLiter(s) Swish and Spit two times a day  BACItracin   Ointment 1 Application(s) Topical two times a day    All other PRN medications:  polyethylene glycol 3350 17 Gram(s) Oral at bedtime PRN      Vital Signs Last 24 Hrs  T(C): 37.1 (01 Aug 2017 10:00), Max: 37.8 (31 Jul 2017 17:05)  T(F): 98.7 (01 Aug 2017 10:00), Max: 100 (31 Jul 2017 17:05)  HR: 82 (01 Aug 2017 08:13) (82 - 112)  BP: 116/61 (01 Aug 2017 08:13) (116/61 - 134/75)  BP(mean): 83 (01 Aug 2017 08:13) (83 - 97)  RR: 17 (01 Aug 2017 08:13) (16 - 20)  SpO2: 100% (01 Aug 2017 08:13) (99% - 100%)      07-31 @ 07:01  -  08-01 @ 07:00  --------------------------------------------------------  IN:    Enteral Tube Flush: 645 mL    Osmolite: 715 mL  Total IN: 1360 mL    OUT:    VAC (Vacuum Assisted Closure) System: 25 mL    Voided: 750 mL  Total OUT: 775 mL    Total NET: 585 mL            PHYSICAL EXAM:  6-0 uncuffed shiley w/ soft collar, Stay sutures taped to chest  R neck soft, flat, incision c/d/i  Intra oral flap soft, pink, warm, strong triphasic signal with handheld doppler  NGT in place @40  Left thigh skin graft site covered with tegaderm, mild sang pooling underneath  L LE in ace bandage,Vac holding suction  moving toes, feet warm     LABS:  CBC-                        9.1    7.2   )-----------( 187      ( 01 Aug 2017 05:36 )             29.3     BMP/CMP-  01 Aug 2017 05:36    138    |  101    |  16     ----------------------------<  123    3.8     |  27     |  0.60     Ca    8.6        01 Aug 2017 05:36  Phos  4.2       01 Aug 2017 05:36  Mg     2.1       01 Aug 2017 05:36      Coagulation Studies-    Endocrine Panel-  Calcium, Total Serum: 8.6 mg/dL (08-01 @ 05:36)          Assessment/Plan:      47 F  s/p Tracheostomy (6-0 shiley), modified right neck dissection, Right segmental mandibulectomy, left fibula MVFF, Left thigh STSG on 7/26 for SCCa  - Continue trach collar, will assess for PMV today  - routine trach care: spare trach at bedside, obturator HOB, use red rubbers only to suction trach, clean inner cannula BID and prn   - HOB elevated, head in neutral position, no turning  - flap checks per plastics protocol. Q4h w/ hand held doppler  - wound vac to be removed today  - OOBTC, FWB w camboot  - Peridex TID  - continue TF at goal, will transition to bolus and plan for dc home w/ NGT feeds  - resume home meds  - continue free water flushes  - pain control  -DVT ppx: SCDs, lovenox 40 daily     Please page ENT with any questions or issues    Dispo: SDU    seen and examined w/ chief resident, plan discussed with attending

## 2017-08-01 NOTE — SWALLOW BEDSIDE ASSESSMENT ADULT - NS SPL SWALLOW CLINIC TRIAL FT
Oral stage is significant for bolus pooling in anterior and lateral sulci with anterior spillage, delayed and inefficient bolus formation and manipulation with most bolus remaining in lateral sulci. Pharyngeal swallow is judged to be weak on palpation with suspected delay in swallow initiation and overt signs of airway protection deficits. Pt is not a candidate for PO diet.

## 2017-08-01 NOTE — SPEAKING VALVE EVALUATION - REMOVE VALVE FOR
SpO2 < 92%/Increased work of breathing/Subjective complaints/Increase RR (1.5 x baseline)/Diaphoresis/Evidence of air trapping/Excessive coughing/Aerosolized respiratory treatments/Sleep/Increased HR (1.5 x baseline)

## 2017-08-01 NOTE — SWALLOW BEDSIDE ASSESSMENT ADULT - ASR SWALLOW ASPIRATION MONITOR
oral hygiene/change of breathing pattern/position upright (90Y)/gurgly voice/cough/fever/throat clearing/pneumonia/upper respiratory infection

## 2017-08-01 NOTE — SWALLOW BEDSIDE ASSESSMENT ADULT - SLP PERTINENT HISTORY OF CURRENT PROBLEM
Pt s/p right segmental mandibulectomy with free flap reconstruction, modified neck dissection, tracheostomy

## 2017-08-01 NOTE — SPEAKING VALVE EVALUATION - ADDITIONAL COMMENTS
Pt benefited from using her fingers to maintain labial seal and swallow pooled saliva. This in turn improved speech intelligibility Pt benefited from using her fingers to maintain labial seal and swallow pooled saliva. This in turn improved speech intelligibility. Pt was taught to independently place and remove PMV.

## 2017-08-01 NOTE — PROGRESS NOTE ADULT - ASSESSMENT
47F s/p right mandibulectomy, neck dissection, tracheostomy, and reconstruction with left fibula free flap.  >> q4H flap checks  >> LLE and HOB elevation  >> DVT propylaxis  >> May ambulate as tolerated with LLE CAM boot in place (full weight bearing)  >> Continue TF  >> As per ENT, plan to d/c patient with NGT and TF; No plan for swallow study as inpatient  >> Plan to d/c VAC Wednesday and start local wound care to skin graft recipient site 47F s/p right mandibulectomy, neck dissection, tracheostomy, and reconstruction with left fibula free flap.  >> q4H flap checks  >> LLE and HOB elevation  >> DVT propylaxis  >> May ambulate as tolerated with LLE CAM boot in place (full weight bearing)  >> Continue TF  >> As per ENT, plan to d/c patient with NGT and TF; No plan for swallow study as inpatient  >> Plan to d/c VAC later today and start local wound care to skin graft recipient site (bacitracin and xeroform)

## 2017-08-01 NOTE — PROGRESS NOTE ADULT - SUBJECTIVE AND OBJECTIVE BOX
SUBJECTIVE:  Doing well.   No overnight events.   Pain controlled.  No complaints.     OBJECTIVE:     ** VITAL SIGNS / I&O's **    T(C): 36.6 (08-01-17 @ 05:00), Max: 37.8 (07-31-17 @ 17:05)  T(F): 97.9 (08-01-17 @ 05:00), Max: 100 (07-31-17 @ 17:05)  HR: 82 (08-01-17 @ 05:28) (82 - 112)  BP: 120/58 (08-01-17 @ 01:00) (117/74 - 134/75)  RR: 18 (08-01-17 @ 05:28) (16 - 20)  SpO2: 100% (08-01-17 @ 05:28) (95% - 100%)      31 Jul 2017 07:01  -  01 Aug 2017 07:00  --------------------------------------------------------  IN:    Enteral Tube Flush: 645 mL    Osmolite: 715 mL  Total IN: 1360 mL    OUT:    VAC (Vacuum Assisted Closure) System: 25 mL    Voided: 750 mL  Total OUT: 775 mL    Total NET: 585 mL          ** PHYSICAL EXAM **    -- CONSTITUTIONAL: Awake. Alert. Oriented x3. Cooperative. Pleasant.  -- HEENT: Right facial edema, slightly improved. No collections appreciated.  -- FLAP: Soft and pink with 2-3 second capillary refill. (+) arterial pencil Doppler signal.   -- NECK: Right neck edema, slightly improved. Ecchymosis resolving. No collections appreciated.  -- CARDIOVASCULAR: Regular rate and rhythm. S1, S2.  -- RESPIRATORY: Bilateral breath sounds.   -- ABDOMEN: Soft, nontender, nondistended.  -- EXTREMITIES: LLE VAC in place. Foot warm with 3 second capillary refill at distal digital pads. SILT at distal digital pads.     ** LABS **                           9.1    7.2   )-----------( 187      ( 01 Aug 2017 05:36 )             29.3     01 Aug 2017 05:36    138    |  101    |  16     ----------------------------<  123    3.8     |  27     |  0.60     Ca    8.6        01 Aug 2017 05:36  Phos  4.2       01 Aug 2017 05:36  Mg     2.1       01 Aug 2017 05:36        CAPILLARY BLOOD GLUCOSE  132 (01 Aug 2017 05:00)  111 (31 Jul 2017 21:06)  117 (31 Jul 2017 16:03)  110 (31 Jul 2017 11:00)

## 2017-08-01 NOTE — SPEAKING VALVE EVALUATION - SLP GENERAL OBSERVATIONS
Pt s/p right segmental mandibulectomy with fibula free flap, modified right neck dissection, tracheostomy

## 2017-08-01 NOTE — SWALLOW BEDSIDE ASSESSMENT ADULT - ORAL PHASE
Stasis in anterior sulcus/Decreased anterior-posterior movement of the bolus/Delayed oral transit time/Stasis in lateral sulci

## 2017-08-02 LAB
ANION GAP SERPL CALC-SCNC: 11 MMOL/L — SIGNIFICANT CHANGE UP (ref 5–17)
BUN SERPL-MCNC: 15 MG/DL — SIGNIFICANT CHANGE UP (ref 7–23)
CALCIUM SERPL-MCNC: 8.4 MG/DL — SIGNIFICANT CHANGE UP (ref 8.4–10.5)
CHLORIDE SERPL-SCNC: 102 MMOL/L — SIGNIFICANT CHANGE UP (ref 96–108)
CO2 SERPL-SCNC: 25 MMOL/L — SIGNIFICANT CHANGE UP (ref 22–31)
CREAT SERPL-MCNC: 0.6 MG/DL — SIGNIFICANT CHANGE UP (ref 0.5–1.3)
GLUCOSE SERPL-MCNC: 105 MG/DL — HIGH (ref 70–99)
MAGNESIUM SERPL-MCNC: 2.3 MG/DL — SIGNIFICANT CHANGE UP (ref 1.6–2.6)
PHOSPHATE SERPL-MCNC: 3.3 MG/DL — SIGNIFICANT CHANGE UP (ref 2.5–4.5)
POTASSIUM SERPL-MCNC: 3.8 MMOL/L — SIGNIFICANT CHANGE UP (ref 3.5–5.3)
POTASSIUM SERPL-SCNC: 3.8 MMOL/L — SIGNIFICANT CHANGE UP (ref 3.5–5.3)
SODIUM SERPL-SCNC: 138 MMOL/L — SIGNIFICANT CHANGE UP (ref 135–145)
SURGICAL PATHOLOGY STUDY: SIGNIFICANT CHANGE UP

## 2017-08-02 PROCEDURE — 71010: CPT | Mod: 26

## 2017-08-02 RX ORDER — PETROLATUM,WHITE
1 JELLY (GRAM) TOPICAL THREE TIMES A DAY
Qty: 0 | Refills: 0 | Status: DISCONTINUED | OUTPATIENT
Start: 2017-08-02 | End: 2017-08-02

## 2017-08-02 RX ORDER — POT CHLORIDE/POT BICARB/CIT AC 25 MEQ
25 TABLET, EFFERVESCENT ORAL ONCE
Qty: 0 | Refills: 0 | Status: DISCONTINUED | OUTPATIENT
Start: 2017-08-02 | End: 2017-08-02

## 2017-08-02 RX ORDER — POTASSIUM CHLORIDE 20 MEQ
20 PACKET (EA) ORAL ONCE
Qty: 0 | Refills: 0 | Status: COMPLETED | OUTPATIENT
Start: 2017-08-02 | End: 2017-08-02

## 2017-08-02 RX ORDER — LOSARTAN POTASSIUM 100 MG/1
25 TABLET, FILM COATED ORAL ONCE
Qty: 0 | Refills: 0 | Status: COMPLETED | OUTPATIENT
Start: 2017-08-02 | End: 2017-08-02

## 2017-08-02 RX ADMIN — Medication 100 MILLIGRAM(S): at 06:08

## 2017-08-02 RX ADMIN — OXYCODONE HYDROCHLORIDE 5 MILLIGRAM(S): 5 TABLET ORAL at 21:59

## 2017-08-02 RX ADMIN — CHLORHEXIDINE GLUCONATE 15 MILLILITER(S): 213 SOLUTION TOPICAL at 18:53

## 2017-08-02 RX ADMIN — Medication 20 MILLIEQUIVALENT(S): at 09:35

## 2017-08-02 RX ADMIN — Medication 650 MILLIGRAM(S): at 12:42

## 2017-08-02 RX ADMIN — Medication 1 APPLICATION(S): at 18:53

## 2017-08-02 RX ADMIN — LOSARTAN POTASSIUM 25 MILLIGRAM(S): 100 TABLET, FILM COATED ORAL at 09:35

## 2017-08-02 RX ADMIN — PANTOPRAZOLE SODIUM 40 MILLIGRAM(S): 20 TABLET, DELAYED RELEASE ORAL at 11:55

## 2017-08-02 RX ADMIN — OXYCODONE HYDROCHLORIDE 5 MILLIGRAM(S): 5 TABLET ORAL at 14:55

## 2017-08-02 RX ADMIN — Medication 1 APPLICATION(S): at 07:29

## 2017-08-02 RX ADMIN — POLYETHYLENE GLYCOL 3350 17 GRAM(S): 17 POWDER, FOR SOLUTION ORAL at 22:43

## 2017-08-02 RX ADMIN — CHLORHEXIDINE GLUCONATE 15 MILLILITER(S): 213 SOLUTION TOPICAL at 06:09

## 2017-08-02 RX ADMIN — Medication 100 MILLIGRAM(S): at 14:04

## 2017-08-02 RX ADMIN — Medication 650 MILLIGRAM(S): at 13:39

## 2017-08-02 RX ADMIN — OXYCODONE HYDROCHLORIDE 5 MILLIGRAM(S): 5 TABLET ORAL at 14:05

## 2017-08-02 RX ADMIN — Medication 100 MILLIGRAM(S): at 14:05

## 2017-08-02 RX ADMIN — Medication 100 MILLIGRAM(S): at 07:30

## 2017-08-02 RX ADMIN — ENOXAPARIN SODIUM 40 MILLIGRAM(S): 100 INJECTION SUBCUTANEOUS at 11:55

## 2017-08-02 RX ADMIN — OXYCODONE HYDROCHLORIDE 5 MILLIGRAM(S): 5 TABLET ORAL at 21:02

## 2017-08-02 NOTE — PROGRESS NOTE ADULT - SUBJECTIVE AND OBJECTIVE BOX
ENT [facility] DAILY PROGRESS NOTE    Overnight events/Interval HPI:    Overnight events/Interval HPI:  47 F s/p Tracheostomy (6-0 shiley), modified right neck dissection, Right segmental mandibulectomy, left fibula MVFF, Left thigh STSG on 7/26 for SCCa    POD0: Transferred directly to SICU from OR. Weaning sedation overnight, remained on vent. Flap checks wnl.   POD1: LEXIE overnight. AFVSS. Flap checks wnl. Weaned to TC.   POD2: LEXIE overnight. AFVSS. Flap checks wnl. Tolerating TF.   POD3: No acute events, CAM boot delivered, working with PT, TF at goal  POD4: No acute events, TF at goal, JPs removed by plastics  POD5: WeDeliver doppler signal intermittently functioning after removal of ALWRENCE, pin prick performed with healthy bleeding tissue, now performing q2h handheld doppler flap checks, otherwise doing well, pain controlled  POD6: tracheostomy changed to 6-0 cuffless yesterday, walking with PT, WeDeliver doppler removed  POD7: evaluated for PMV, tolerates for short periods of time, wound vac removed, ambulating daily with CAM boot          Allergies    aspirin (Stomach Upset)    Intolerances        MEDICATIONS:  Antiinfectives:   ceFAZolin   IVPB   IV Intermittent   ceFAZolin   IVPB 1000 milliGRAM(s) IV Intermittent every 8 hours  metroNIDAZOLE  IVPB   IV Intermittent   metroNIDAZOLE  IVPB 500 milliGRAM(s) IV Intermittent every 8 hours    IV fluids:    Hematologic/Anticoagulation:  enoxaparin Injectable 40 milliGRAM(s) SubCutaneous daily    Pain medications/Neuro:  ALPRAZolam 0.25 milliGRAM(s) Oral every 8 hours PRN  ondansetron Injectable 4 milliGRAM(s) IV Push every 6 hours PRN  acetaminophen    Suspension. 650 milliGRAM(s) Oral every 6 hours PRN  oxyCODONE    Solution 5 milliGRAM(s) Oral every 4 hours PRN    Endocrine Medications:   insulin lispro (HumaLOG) corrective regimen sliding scale   SubCutaneous Before meals and at bedtime    All other standing medications:   pantoprazole  Injectable 40 milliGRAM(s) IV Push daily  losartan 50 milliGRAM(s) Oral daily  chlorhexidine 0.12% Liquid 15 milliLiter(s) Swish and Spit two times a day  polyethylene glycol 3350 17 Gram(s) Oral at bedtime  BACItracin   Ointment 1 Application(s) Topical two times a day  AQUAPHOR (petrolatum Ointment) 1 Application(s) Topical three times a day    All other PRN medications:      Vital Signs Last 24 Hrs  T(C): 37.6 (02 Aug 2017 06:00), Max: 38.2 (01 Aug 2017 22:15)  T(F): 99.7 (02 Aug 2017 06:00), Max: 100.7 (01 Aug 2017 22:15)  HR: 98 (02 Aug 2017 05:00) (85 - 114)  BP: 119/65 (02 Aug 2017 05:00) (110/56 - 134/62)  BP(mean): 84 (02 Aug 2017 05:00) (77 - 93)  RR: 16 (02 Aug 2017 05:00) (14 - 18)  SpO2: 99% (02 Aug 2017 05:00) (97% - 100%)      08-01 @ 07:01  -  08-02 @ 07:00  --------------------------------------------------------  IN:    Enteral Tube Flush: 645 mL    Osmolite: 1300 mL  Total IN: 1945 mL    OUT:    Voided: 1100 mL  Total OUT: 1100 mL    Total NET: 845 mL            PHYSICAL EXAM:      LABS:  CBC-                        9.1    7.2   )-----------( 187      ( 01 Aug 2017 05:36 )             29.3     BMP/CMP-  02 Aug 2017 06:01    138    |  102    |  15     ----------------------------<  105    3.8     |  25     |  0.60     Ca    8.4        02 Aug 2017 06:01  Phos  3.3       02 Aug 2017 06:01  Mg     2.3       02 Aug 2017 06:01      Coagulation Studies-    Endocrine Panel-  Calcium, Total Serum: 8.4 mg/dL (08-02 @ 06:01)        Assessment/Plan:      47 F  s/p Tracheostomy (6-0 shiley), modified right neck dissection, Right segmental mandibulectomy, left fibula MVFF, Left thigh STSG on 7/26 for SCCa  - Continue PMV as tolerated  - routine trach care: spare trach at bedside, obturator HOB, use red rubbers only to suction trach, clean inner cannula BID and prn   - HOB elevated, head in neutral position, no turning  - flap checks per plastics protocol. Q4h w/ hand held doppler  - OOBTC, FWB w camboot  - Peridex TID  - continue TF at goal, will transition to bolus and plan for dc home w/ NGT feeds  - resume home meds  - continue free water flushes  - pain control  -DVT ppx: SCDs, lovenox 40 daily     Please page ENT with any questions or issues    Dispo: SDU    seen and examined w/ chief resident, plan discussed with attending

## 2017-08-02 NOTE — PROGRESS NOTE ADULT - ASSESSMENT
47F s/p right mandibulectomy, neck dissection, tracheostomy, and reconstruction with left fibula free flap. POD 7  >> q4H flap checks  >> LLE and HOB elevation  >> DVT prophylaxis  >> May ambulate as tolerated with LLE CAM boot in place (full weight bearing)  >> Continue TF  >> As per ENT, plan to d/c patient with NGT and TF; No plan for swallow study as inpatient  >> Dispo. planning

## 2017-08-02 NOTE — PROGRESS NOTE ADULT - SUBJECTIVE AND OBJECTIVE BOX
SUBJECTIVE:  Doing well.   No overnight events.     OBJECTIVE:     ** VITAL SIGNS / I&O's **    T(C): 38.2 (08-01-17 @ 22:15), Max: 38.2 (08-01-17 @ 22:15)  T(F): 100.7 (08-01-17 @ 22:15), Max: 100.7 (08-01-17 @ 22:15)  HR: 98 (08-02-17 @ 05:00) (82 - 114)  BP: 119/65 (08-02-17 @ 05:00) (110/56 - 134/62)  RR: 16 (08-02-17 @ 05:00) (14 - 18)  SpO2: 99% (08-02-17 @ 05:00) (97% - 100%)      01 Aug 2017 07:01  -  02 Aug 2017 07:00  --------------------------------------------------------  IN:    Enteral Tube Flush: 645 mL    Osmolite: 1300 mL  Total IN: 1945 mL    OUT:    Voided: 1100 mL  Total OUT: 1100 mL    Total NET: 845 mL          ** PHYSICAL EXAM **    -- CONSTITUTIONAL: Awake. Alert. Oriented x3. Cooperative. Pleasant.  -- HEENT: Right facial edema, slightly improved. No collections appreciated.  -- FLAP: Soft and pink with 2-3 second capillary refill. (+) arterial pencil Doppler signal.   -- NECK: Right neck edema, slightly improved. Ecchymosis resolving. No collections appreciated.  -- CARDIOVASCULAR: Regular rate and rhythm. S1, S2.  -- RESPIRATORY: Bilateral breath sounds.   -- ABDOMEN: Soft, nontender, nondistended.  -- EXTREMITIES: Left thigh dressing disrupted. Removed and Aquaphor applied. LLE STSG with good take. Foot warm with 3 second capillary refill at distal digital pads. SILT at distal digital pads.     ** LABS **       02 Aug 2017 06:01    138    |  102    |  15     ----------------------------<  105    3.8     |  25     |  0.60     Ca    8.4        02 Aug 2017 06:01  Phos  3.3       02 Aug 2017 06:01  Mg     2.3       02 Aug 2017 06:01        CAPILLARY BLOOD GLUCOSE  106 (01 Aug 2017 22:15)  93 (01 Aug 2017 16:39)  111 (01 Aug 2017 12:00)

## 2017-08-03 VITALS
OXYGEN SATURATION: 98 % | DIASTOLIC BLOOD PRESSURE: 61 MMHG | HEART RATE: 112 BPM | SYSTOLIC BLOOD PRESSURE: 121 MMHG | RESPIRATION RATE: 16 BRPM

## 2017-08-03 PROCEDURE — 97116 GAIT TRAINING THERAPY: CPT

## 2017-08-03 PROCEDURE — 88331 PATH CONSLTJ SURG 1 BLK 1SPC: CPT

## 2017-08-03 PROCEDURE — 97161 PT EVAL LOW COMPLEX 20 MIN: CPT

## 2017-08-03 PROCEDURE — 94002 VENT MGMT INPAT INIT DAY: CPT

## 2017-08-03 PROCEDURE — 84132 ASSAY OF SERUM POTASSIUM: CPT

## 2017-08-03 PROCEDURE — 85610 PROTHROMBIN TIME: CPT

## 2017-08-03 PROCEDURE — C1713: CPT

## 2017-08-03 PROCEDURE — 94799 UNLISTED PULMONARY SVC/PX: CPT

## 2017-08-03 PROCEDURE — 86850 RBC ANTIBODY SCREEN: CPT

## 2017-08-03 PROCEDURE — 88309 TISSUE EXAM BY PATHOLOGIST: CPT

## 2017-08-03 PROCEDURE — 36415 COLL VENOUS BLD VENIPUNCTURE: CPT

## 2017-08-03 PROCEDURE — 85018 HEMOGLOBIN: CPT

## 2017-08-03 PROCEDURE — 88311 DECALCIFY TISSUE: CPT

## 2017-08-03 PROCEDURE — 92597 ORAL SPEECH DEVICE EVAL: CPT | Mod: GN

## 2017-08-03 PROCEDURE — 85027 COMPLETE CBC AUTOMATED: CPT

## 2017-08-03 PROCEDURE — 82330 ASSAY OF CALCIUM: CPT

## 2017-08-03 PROCEDURE — 88305 TISSUE EXAM BY PATHOLOGIST: CPT

## 2017-08-03 PROCEDURE — 85730 THROMBOPLASTIN TIME PARTIAL: CPT

## 2017-08-03 PROCEDURE — 86900 BLOOD TYPING SEROLOGIC ABO: CPT

## 2017-08-03 PROCEDURE — C1889: CPT

## 2017-08-03 PROCEDURE — 71045 X-RAY EXAM CHEST 1 VIEW: CPT

## 2017-08-03 PROCEDURE — 84100 ASSAY OF PHOSPHORUS: CPT

## 2017-08-03 PROCEDURE — 84295 ASSAY OF SERUM SODIUM: CPT

## 2017-08-03 PROCEDURE — 80048 BASIC METABOLIC PNL TOTAL CA: CPT

## 2017-08-03 PROCEDURE — 83735 ASSAY OF MAGNESIUM: CPT

## 2017-08-03 PROCEDURE — 86901 BLOOD TYPING SEROLOGIC RH(D): CPT

## 2017-08-03 RX ORDER — CHLORHEXIDINE GLUCONATE 213 G/1000ML
15 SOLUTION TOPICAL
Qty: 200 | Refills: 0 | OUTPATIENT
Start: 2017-08-03 | End: 2017-09-02

## 2017-08-03 RX ORDER — OXYCODONE HYDROCHLORIDE 5 MG/1
5 TABLET ORAL
Qty: 90 | Refills: 0 | OUTPATIENT
Start: 2017-08-03 | End: 2017-08-06

## 2017-08-03 RX ORDER — ALPRAZOLAM 0.25 MG
1 TABLET ORAL
Qty: 0 | Refills: 0 | COMMUNITY

## 2017-08-03 RX ORDER — ACETAMINOPHEN 500 MG
640 TABLET ORAL
Qty: 35840 | Refills: 0 | OUTPATIENT
Start: 2017-08-03 | End: 2017-08-17

## 2017-08-03 RX ORDER — FAMOTIDINE 10 MG/ML
40 INJECTION INTRAVENOUS
Qty: 560 | Refills: 0 | OUTPATIENT
Start: 2017-08-03 | End: 2017-08-17

## 2017-08-03 RX ADMIN — Medication 1 APPLICATION(S): at 17:17

## 2017-08-03 RX ADMIN — CHLORHEXIDINE GLUCONATE 15 MILLILITER(S): 213 SOLUTION TOPICAL at 17:16

## 2017-08-03 RX ADMIN — CHLORHEXIDINE GLUCONATE 15 MILLILITER(S): 213 SOLUTION TOPICAL at 05:29

## 2017-08-03 RX ADMIN — PANTOPRAZOLE SODIUM 40 MILLIGRAM(S): 20 TABLET, DELAYED RELEASE ORAL at 12:21

## 2017-08-03 RX ADMIN — ENOXAPARIN SODIUM 40 MILLIGRAM(S): 100 INJECTION SUBCUTANEOUS at 12:21

## 2017-08-03 RX ADMIN — LOSARTAN POTASSIUM 50 MILLIGRAM(S): 100 TABLET, FILM COATED ORAL at 05:30

## 2017-08-03 NOTE — CHART NOTE - NSCHARTNOTESELECT_GEN_ALL_CORE
Event Note/Plastic Surgery
Plastic Surgery/Event Note
Nutrition Services/Nutrition follow up note

## 2017-08-03 NOTE — PROGRESS NOTE ADULT - SUBJECTIVE AND OBJECTIVE BOX
008-03-17 @ 08:01            [facility] [id number] [id number]    T(C): 36.7 (08-03-17 @ 05:59), Max: 37.2 (08-02-17 @ 09:00)  HR: 96 (08-03-17 @ 05:18) (88 - 118)  BP: 122/60 (08-03-17 @ 05:18) (103/56 - 156/66)  RR: 18 (08-03-17 @ 05:18) (14 - 20)  SpO2: 100% (08-03-17 @ 05:18) (97% - 100%)    08-02-17 @ 07:01  -  08-03-17 @ 07:00  --------------------------------------------------------  IN:    Enteral Tube Flush: 100 mL    Free Water: 645 mL    Osmolite: 960 mL  Total IN: 1705 mL    OUT:    Voided: 775 mL  Total OUT: 775 mL    Total NET: 930 mL    patient is sitting in chair. started PT with good progress, feeding tube in place, she reports less salivation. Trach was capped for the night and is planned to be removed today (Dr. Block). Patient was explained about the flap eventual shrinkage, and the radiation effect on it.           ***Physical Exam***  General: WN/WD NAD  Neurology: A&Ox3, nonfocal, PIERCE x 4  Respiratory: CTA B/L  CV: RRR, S1S2, no murmurs, rubs or gallops  Abdominal: Soft, NT, ND +BS.  Extremities: No edema, STSG on left leg 100% take.  : Bacon catheter in place  Flap: Pink, soft, No evidence of congestion/ Ischemia/ necrosis/ Dehiscence.  Capillary refill good 2-3sec, Doppler signal strong.

## 2017-08-03 NOTE — CHART NOTE - NSCHARTNOTEFT_GEN_A_CORE
Admitting Diagnosis:   Patient is a 47y old  Female who presents with a chief complaint of Tracheostomy, modified right neck dissection, Right segmental mandibulectomy, left fibula MVFF, Left thigh STSG (2017 10:55)      PAST MEDICAL & SURGICAL HISTORY:  Anxiety  SCC (squamous cell carcinoma): of mouth  GERD (gastroesophageal reflux disease)  HTN (hypertension)  H/O cone biopsy of cervix      Current Nutrition Order:  Osmolite 1.2 via NGT 7 cans/day    GI Issues:   Denies N/V/C  C/o loose stool; d/w ENT to d/c miralax and will change current formula for a high fiber content formula.    Pain:  Leg pain; RN aware    Skin Integrity:  R neck/mouth/tongue ASWs  L thigh/calf ASWs    Labs:       138  |  102  |  15  ----------------------------<  105<H>  3.8   |  25  |  0.60    Ca    8.4      02 Aug 2017 06:01  Phos  3.3       Mg     2.3     08      CAPILLARY BLOOD GLUCOSE  109 (03 Aug 2017 11:29)  121 (03 Aug 2017 05:59)  130 (02 Aug 2017 21:52)  104 (02 Aug 2017 16:10)          Medications:  MEDICATIONS  (STANDING):  enoxaparin Injectable 40 milliGRAM(s) SubCutaneous daily  insulin lispro (HumaLOG) corrective regimen sliding scale   SubCutaneous Before meals and at bedtime  pantoprazole  Injectable 40 milliGRAM(s) IV Push daily  losartan 50 milliGRAM(s) Oral daily  chlorhexidine 0.12% Liquid 15 milliLiter(s) Swish and Spit two times a day  BACItracin   Ointment 1 Application(s) Topical two times a day    MEDICATIONS  (PRN):  ALPRAZolam 0.25 milliGRAM(s) Oral every 8 hours PRN anxiety  ondansetron Injectable 4 milliGRAM(s) IV Push every 6 hours PRN Nausea and/or Vomiting  acetaminophen    Suspension. 650 milliGRAM(s) Oral every 6 hours PRN Mild Pain (1 - 3)  oxyCODONE    Solution 5 milliGRAM(s) Oral every 4 hours PRN Moderate Pain (4 - 6)      Weight:  103.1kg ()    Weight Change:   No new wt taken    Estimated energy needs:   IBW 60.2k-1806kcal (25-30cal/kg IBW), 72-84g pro (1.2-1.4g pro/kg IBW), 1505-1806ml water  Adjusted for post-op     Subjective:   48y/o F s/p modified right neck dissection, Right segmental mandibulectomy, left fibula MVFF, Left thigh STSG and tracheostomy. Per d/w ENT, pt for trach decannulation today and to d/c on home NGT gravity feeds. Pt reports good tolerance of formula without N/V and abd pain, but c/o loose stool. Recommend to d/c miralax and utilizing Jevity 1.2 instead of Osmolite 1.2 for fiber content; d/w ENT. Discussed gravity feeds with pt. Pt reports she was told to flush NGT with ginger-belem which is inappropriate. RD educated pt on safe tube use practices and to only use formula and water with NGT.       Previous Nutrition Diagnosis:  Inadequate oral intake r/t ianbility to meet needs PO AEB EN dependent at present    Active [X]  Resolved [   ]    Goal:  Pt to meet 100% of EER via EN with good tolerance.     Recommendations:  Jevity 1.2, 6cans/day gravity via NGT (1710kcal, 79g pro, 1146ml water). Add additional 600ml free water/day. Flush tube before and after feeds with water to maintain tube patency.   Monitor for s/s of intolerance.   Monitor lytes and replete prn.   D/c miralax.     Education:   [place] EN feeds  Safe tube use  EN providing EER    Risk Level: High [X] Moderate [   ] Low [   ]
Patient seen and examined.    Doing well.   Pain controlled.  No complaints.    AVSS.  Pleasant. Cooperative. Reclining in stretcher.  Flap skin paddle soft, pink, minimally edematous with (+) arterial cook doppler signal and 2-3 second capillary refill. Incisions intact.  Right neck and lower 1/3 of face soft and edematous but no evidence of collections. LAWRENCE serosanguinous.  Left lower leg dressing in place. VAC functioning. LAWRENCE serosanguinous. Foot warm and pink. 2-3 second capillary refill and SILT at distal aspects of all digits.    47F s/p right mandibulectomy tracheostomy, reconstruction with left free fibula flap. POD 1.  >> q1H flap checks  >> HOB elevation  >> Continue antibiotics  >> DVT prophylaxis  >> LLE elevation  >> Continue VAC for 6-7 days total  >> Titrate IVF to urine output  >> PT consult --- will need CAM boot for LLE in order to ambulate
Patient seen and examined.    Doing well.  OOBTC.  CAM boot in corner.    Flap viable. 2 second capillary refill. (+) arterial cook doppler signal.    Continue current management.
Patient seen and examined.    Flap viable with 2-3 second capillary refill and (+) arterial Doppler signal.    Continue current management.
Patient seen and examined.    Flap viable with 2-3 second capillary refill and (+) arterial Doppler signal. No congestion.    Continue current management.
Patient seen and examined.    Flap viable with 2-3 second capillary refill and (+) arterial pencil Doppler signal.    Continue current management.   [facility] Doppler wire cut this evening.

## 2017-08-04 PROBLEM — I10 ESSENTIAL (PRIMARY) HYPERTENSION: Chronic | Status: ACTIVE | Noted: 2017-07-25

## 2017-08-04 PROBLEM — C44.92 SQUAMOUS CELL CARCINOMA OF SKIN, UNSPECIFIED: Chronic | Status: ACTIVE | Noted: 2017-07-25

## 2017-08-04 PROBLEM — K21.9 GASTRO-ESOPHAGEAL REFLUX DISEASE WITHOUT ESOPHAGITIS: Chronic | Status: ACTIVE | Noted: 2017-07-25

## 2017-08-04 PROBLEM — F41.9 ANXIETY DISORDER, UNSPECIFIED: Chronic | Status: ACTIVE | Noted: 2017-07-25

## 2017-08-07 DIAGNOSIS — C41.1 MALIGNANT NEOPLASM OF MANDIBLE: ICD-10-CM

## 2017-08-07 DIAGNOSIS — F41.9 ANXIETY DISORDER, UNSPECIFIED: ICD-10-CM

## 2017-08-07 DIAGNOSIS — I10 ESSENTIAL (PRIMARY) HYPERTENSION: ICD-10-CM

## 2017-08-07 DIAGNOSIS — C79.51 SECONDARY MALIGNANT NEOPLASM OF BONE: ICD-10-CM

## 2017-08-07 DIAGNOSIS — C04.9 MALIGNANT NEOPLASM OF FLOOR OF MOUTH, UNSPECIFIED: ICD-10-CM

## 2017-08-07 DIAGNOSIS — K21.9 GASTRO-ESOPHAGEAL REFLUX DISEASE WITHOUT ESOPHAGITIS: ICD-10-CM

## 2017-08-07 DIAGNOSIS — Z88.6 ALLERGY STATUS TO ANALGESIC AGENT: ICD-10-CM

## 2017-08-07 DIAGNOSIS — E66.9 OBESITY, UNSPECIFIED: ICD-10-CM

## 2017-08-14 ENCOUNTER — APPOINTMENT (OUTPATIENT)
Dept: PLASTIC SURGERY | Facility: CLINIC | Age: 47
End: 2017-08-14
Payer: COMMERCIAL

## 2017-08-14 PROCEDURE — 99024 POSTOP FOLLOW-UP VISIT: CPT

## 2017-09-01 ENCOUNTER — MOBILE ON CALL (OUTPATIENT)
Age: 47
End: 2017-09-01

## 2017-09-07 ENCOUNTER — APPOINTMENT (OUTPATIENT)
Dept: PLASTIC SURGERY | Facility: CLINIC | Age: 47
End: 2017-09-07
Payer: COMMERCIAL

## 2017-09-07 DIAGNOSIS — L92.9 GRANULOMATOUS DISORDER OF THE SKIN AND SUBCUTANEOUS TISSUE, UNSPECIFIED: ICD-10-CM

## 2017-09-07 PROCEDURE — 99024 POSTOP FOLLOW-UP VISIT: CPT

## 2017-09-07 PROCEDURE — 17250 CHEM CAUT OF GRANLTJ TISSUE: CPT | Mod: 58

## 2017-10-24 NOTE — PHYSICAL THERAPY INITIAL EVALUATION ADULT - LIVES WITH, PROFILE
in house, ~5 steps to enter/flight to bedroom and bathroom/alone Principal Discharge DX:	 (normal spontaneous vaginal delivery)  Goal:	recovery  Instructions for follow-up, activity and diet:	diet and activity as discussed and tolerated; please call office to schedule f/u appointment 5 weeks after delivery or earlier if needed

## 2017-10-26 NOTE — PHYSICAL THERAPY INITIAL EVALUATION ADULT - CRITERIA FOR SKILLED THERAPEUTIC INTERVENTIONS
rehab potential/therapy frequency/functional limitations in following categories/impairments found no

## 2017-12-14 ENCOUNTER — APPOINTMENT (OUTPATIENT)
Dept: PLASTIC SURGERY | Facility: CLINIC | Age: 47
End: 2017-12-14
Payer: COMMERCIAL

## 2017-12-14 VITALS — WEIGHT: 229 LBS | HEIGHT: 66 IN | BODY MASS INDEX: 36.8 KG/M2

## 2017-12-14 PROCEDURE — 99213 OFFICE O/P EST LOW 20 MIN: CPT

## 2018-02-26 ENCOUNTER — OUTPATIENT (OUTPATIENT)
Dept: OUTPATIENT SERVICES | Facility: HOSPITAL | Age: 48
LOS: 1 days | End: 2018-02-26
Payer: COMMERCIAL

## 2018-02-26 ENCOUNTER — APPOINTMENT (OUTPATIENT)
Dept: MRI IMAGING | Facility: HOSPITAL | Age: 48
End: 2018-02-26
Payer: COMMERCIAL

## 2018-02-26 ENCOUNTER — APPOINTMENT (OUTPATIENT)
Dept: OTOLARYNGOLOGY | Facility: CLINIC | Age: 48
End: 2018-02-26
Payer: COMMERCIAL

## 2018-02-26 VITALS
HEART RATE: 109 BPM | DIASTOLIC BLOOD PRESSURE: 95 MMHG | HEIGHT: 66 IN | WEIGHT: 229 LBS | OXYGEN SATURATION: 100 % | TEMPERATURE: 98.6 F | SYSTOLIC BLOOD PRESSURE: 145 MMHG | BODY MASS INDEX: 36.8 KG/M2

## 2018-02-26 DIAGNOSIS — C06.9 MALIGNANT NEOPLASM OF MOUTH, UNSPECIFIED: ICD-10-CM

## 2018-02-26 DIAGNOSIS — Z98.890 OTHER SPECIFIED POSTPROCEDURAL STATES: Chronic | ICD-10-CM

## 2018-02-26 PROCEDURE — 99203 OFFICE O/P NEW LOW 30 MIN: CPT

## 2018-02-26 PROCEDURE — A9577: CPT

## 2018-02-26 PROCEDURE — 70543 MRI ORBT/FAC/NCK W/O &W/DYE: CPT | Mod: 26

## 2018-02-26 PROCEDURE — 70543 MRI ORBT/FAC/NCK W/O &W/DYE: CPT

## 2018-03-01 PROBLEM — C06.9 ORAL CANCER: Status: ACTIVE | Noted: 2017-07-06

## 2018-03-03 ENCOUNTER — FORM ENCOUNTER (OUTPATIENT)
Age: 48
End: 2018-03-03

## 2018-03-04 ENCOUNTER — APPOINTMENT (OUTPATIENT)
Dept: CT IMAGING | Facility: HOSPITAL | Age: 48
End: 2018-03-04
Payer: COMMERCIAL

## 2018-03-04 ENCOUNTER — OUTPATIENT (OUTPATIENT)
Dept: OUTPATIENT SERVICES | Facility: HOSPITAL | Age: 48
LOS: 1 days | End: 2018-03-04
Payer: COMMERCIAL

## 2018-03-04 DIAGNOSIS — Z98.890 OTHER SPECIFIED POSTPROCEDURAL STATES: Chronic | ICD-10-CM

## 2018-03-04 PROCEDURE — 70486 CT MAXILLOFACIAL W/O DYE: CPT

## 2018-03-04 PROCEDURE — 70486 CT MAXILLOFACIAL W/O DYE: CPT | Mod: 26

## 2018-03-05 NOTE — ASU PATIENT PROFILE, ADULT - PSH
H/O cone biopsy of cervix H/O cone biopsy of cervix    Surgery, elective  tumor resection lower right mandible and resconstructive using left fibula

## 2018-03-06 ENCOUNTER — APPOINTMENT (OUTPATIENT)
Dept: OTOLARYNGOLOGY | Facility: HOSPITAL | Age: 48
End: 2018-03-06

## 2018-03-06 ENCOUNTER — RESULT REVIEW (OUTPATIENT)
Age: 48
End: 2018-03-06

## 2018-03-06 ENCOUNTER — OUTPATIENT (OUTPATIENT)
Dept: OUTPATIENT SERVICES | Facility: HOSPITAL | Age: 48
LOS: 1 days | Discharge: HOME | End: 2018-03-06
Payer: COMMERCIAL

## 2018-03-06 VITALS
OXYGEN SATURATION: 100 % | DIASTOLIC BLOOD PRESSURE: 75 MMHG | SYSTOLIC BLOOD PRESSURE: 130 MMHG | RESPIRATION RATE: 18 BRPM | TEMPERATURE: 98 F | HEART RATE: 84 BPM | HEIGHT: 66 IN | WEIGHT: 227.96 LBS

## 2018-03-06 DIAGNOSIS — Z98.890 OTHER SPECIFIED POSTPROCEDURAL STATES: Chronic | ICD-10-CM

## 2018-03-06 DIAGNOSIS — Z41.9 ENCOUNTER FOR PROCEDURE FOR PURPOSES OTHER THAN REMEDYING HEALTH STATE, UNSPECIFIED: Chronic | ICD-10-CM

## 2018-03-06 PROCEDURE — 61607 RESECT/EXCISE CRANIAL LESION: CPT

## 2018-03-06 PROCEDURE — 42806 BIOPSY OF UPPER NOSE/THROAT: CPT

## 2018-03-06 PROCEDURE — 61782 SCAN PROC CRANIAL EXTRA: CPT

## 2018-03-06 RX ORDER — FAMOTIDINE 10 MG/ML
20 INJECTION INTRAVENOUS DAILY
Qty: 0 | Refills: 0 | Status: DISCONTINUED | OUTPATIENT
Start: 2018-03-06 | End: 2018-03-07

## 2018-03-06 RX ORDER — LOSARTAN POTASSIUM 100 MG/1
50 TABLET, FILM COATED ORAL DAILY
Qty: 0 | Refills: 0 | Status: DISCONTINUED | OUTPATIENT
Start: 2018-03-06 | End: 2018-03-07

## 2018-03-06 RX ORDER — HYDROCHLOROTHIAZIDE 25 MG
12.5 TABLET ORAL DAILY
Qty: 0 | Refills: 0 | Status: DISCONTINUED | OUTPATIENT
Start: 2018-03-06 | End: 2018-03-07

## 2018-03-06 RX ORDER — MORPHINE SULFATE 50 MG/1
2 CAPSULE, EXTENDED RELEASE ORAL EVERY 4 HOURS
Qty: 0 | Refills: 0 | Status: DISCONTINUED | OUTPATIENT
Start: 2018-03-06 | End: 2018-03-07

## 2018-03-06 RX ORDER — LACTOBACILLUS ACIDOPHILUS 100MM CELL
1 CAPSULE ORAL DAILY
Qty: 0 | Refills: 0 | Status: DISCONTINUED | OUTPATIENT
Start: 2018-03-06 | End: 2018-03-07

## 2018-03-06 RX ORDER — CHLORHEXIDINE GLUCONATE 213 G/1000ML
15 SOLUTION TOPICAL
Qty: 0 | Refills: 0 | Status: DISCONTINUED | OUTPATIENT
Start: 2018-03-06 | End: 2018-03-07

## 2018-03-06 RX ORDER — ONDANSETRON 8 MG/1
4 TABLET, FILM COATED ORAL EVERY 6 HOURS
Qty: 0 | Refills: 0 | Status: DISCONTINUED | OUTPATIENT
Start: 2018-03-06 | End: 2018-03-07

## 2018-03-06 RX ORDER — DOCUSATE SODIUM 100 MG
100 CAPSULE ORAL
Qty: 0 | Refills: 0 | Status: DISCONTINUED | OUTPATIENT
Start: 2018-03-06 | End: 2018-03-07

## 2018-03-06 RX ORDER — METOCLOPRAMIDE HCL 10 MG
10 TABLET ORAL ONCE
Qty: 0 | Refills: 0 | Status: DISCONTINUED | OUTPATIENT
Start: 2018-03-06 | End: 2018-03-06

## 2018-03-06 RX ORDER — ONDANSETRON 8 MG/1
4 TABLET, FILM COATED ORAL ONCE
Qty: 0 | Refills: 0 | Status: DISCONTINUED | OUTPATIENT
Start: 2018-03-06 | End: 2018-03-06

## 2018-03-06 RX ORDER — ALPRAZOLAM 0.25 MG
0.5 TABLET ORAL EVERY 8 HOURS
Qty: 0 | Refills: 0 | Status: DISCONTINUED | OUTPATIENT
Start: 2018-03-06 | End: 2018-03-07

## 2018-03-06 RX ORDER — OXYCODONE AND ACETAMINOPHEN 5; 325 MG/1; MG/1
1 TABLET ORAL EVERY 4 HOURS
Qty: 0 | Refills: 0 | Status: DISCONTINUED | OUTPATIENT
Start: 2018-03-06 | End: 2018-03-07

## 2018-03-06 RX ORDER — SODIUM CHLORIDE 9 MG/ML
1000 INJECTION INTRAMUSCULAR; INTRAVENOUS; SUBCUTANEOUS
Qty: 0 | Refills: 0 | Status: DISCONTINUED | OUTPATIENT
Start: 2018-03-06 | End: 2018-03-07

## 2018-03-06 RX ORDER — CEFAZOLIN SODIUM 1 G
1000 VIAL (EA) INJECTION EVERY 8 HOURS
Qty: 0 | Refills: 0 | Status: DISCONTINUED | OUTPATIENT
Start: 2018-03-06 | End: 2018-03-07

## 2018-03-06 RX ORDER — ACETAMINOPHEN 500 MG
650 TABLET ORAL EVERY 6 HOURS
Qty: 0 | Refills: 0 | Status: DISCONTINUED | OUTPATIENT
Start: 2018-03-06 | End: 2018-03-07

## 2018-03-06 RX ADMIN — Medication 100 MILLIGRAM(S): at 17:31

## 2018-03-06 RX ADMIN — SODIUM CHLORIDE 100 MILLILITER(S): 9 INJECTION INTRAMUSCULAR; INTRAVENOUS; SUBCUTANEOUS at 21:00

## 2018-03-06 RX ADMIN — Medication 12.5 MILLIGRAM(S): at 17:31

## 2018-03-06 RX ADMIN — Medication 100 MILLIGRAM(S): at 16:15

## 2018-03-06 RX ADMIN — CHLORHEXIDINE GLUCONATE 15 MILLILITER(S): 213 SOLUTION TOPICAL at 17:31

## 2018-03-06 RX ADMIN — LOSARTAN POTASSIUM 50 MILLIGRAM(S): 100 TABLET, FILM COATED ORAL at 17:31

## 2018-03-06 RX ADMIN — Medication 100 MILLIGRAM(S): at 21:01

## 2018-03-06 NOTE — BRIEF OPERATIVE NOTE - PROCEDURE
<<-----Click on this checkbox to enter Procedure Endoscopic biopsy  03/06/2018  image guided, of clival mass  Active  [id number]

## 2018-03-07 VITALS
OXYGEN SATURATION: 98 % | SYSTOLIC BLOOD PRESSURE: 123 MMHG | TEMPERATURE: 98 F | DIASTOLIC BLOOD PRESSURE: 79 MMHG | RESPIRATION RATE: 17 BRPM | HEART RATE: 86 BPM

## 2018-03-07 PROCEDURE — 86901 BLOOD TYPING SEROLOGIC RH(D): CPT

## 2018-03-07 PROCEDURE — 88341 IMHCHEM/IMCYTCHM EA ADD ANTB: CPT

## 2018-03-07 PROCEDURE — 88305 TISSUE EXAM BY PATHOLOGIST: CPT

## 2018-03-07 PROCEDURE — 86900 BLOOD TYPING SEROLOGIC ABO: CPT

## 2018-03-07 PROCEDURE — 86850 RBC ANTIBODY SCREEN: CPT

## 2018-03-07 PROCEDURE — 31299 UNLISTED PX ACCESSORY SINUS: CPT

## 2018-03-07 PROCEDURE — 64999 UNLISTED PX NERVOUS SYSTEM: CPT

## 2018-03-07 PROCEDURE — C1889: CPT

## 2018-03-07 RX ADMIN — FAMOTIDINE 20 MILLIGRAM(S): 10 INJECTION INTRAVENOUS at 12:03

## 2018-03-07 RX ADMIN — CHLORHEXIDINE GLUCONATE 15 MILLILITER(S): 213 SOLUTION TOPICAL at 05:04

## 2018-03-07 RX ADMIN — OXYCODONE AND ACETAMINOPHEN 1 TABLET(S): 5; 325 TABLET ORAL at 00:22

## 2018-03-07 RX ADMIN — Medication 1 TABLET(S): at 12:03

## 2018-03-07 RX ADMIN — LOSARTAN POTASSIUM 50 MILLIGRAM(S): 100 TABLET, FILM COATED ORAL at 05:04

## 2018-03-07 RX ADMIN — Medication 100 MILLIGRAM(S): at 05:03

## 2018-03-07 RX ADMIN — Medication 12.5 MILLIGRAM(S): at 05:04

## 2018-03-07 RX ADMIN — Medication 100 MILLIGRAM(S): at 05:04

## 2018-03-07 RX ADMIN — OXYCODONE AND ACETAMINOPHEN 1 TABLET(S): 5; 325 TABLET ORAL at 01:22

## 2018-03-07 NOTE — PROGRESS NOTE ADULT - SUBJECTIVE AND OBJECTIVE BOX
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 AFVSS. Pain well controlled. Tolerating PO. Ambulating, voiding. No bleeding from nose or PND. Denies CP, SOB, vision changes, n/v. On ancef for abx ppx.     Vital Signs Last 24 Hrs  T(C): 37.3 (07 Mar 2018 05:30), Max: 37.3 (07 Mar 2018 00:39)  T(F): 99.2 (07 Mar 2018 05:30), Max: 99.2 (07 Mar 2018 00:39)  HR: 101 (07 Mar 2018 05:30) (70 - 115)  BP: 123/81 (07 Mar 2018 05:30) (116/70 - 146/79)  BP(mean): 97 (06 Mar 2018 17:00) (86 - 127)  RR: 17 (07 Mar 2018 05:30) (14 - 26)  SpO2: 100% (07 Mar 2018 05:30) (95% - 100%)    I&O's Summary    06 Mar 2018 07:01  -  07 Mar 2018 07:00  --------------------------------------------------------  IN: 2975 mL / OUT: 900 mL / NET: 2075 mL      PHYSICAL EXAM:  NAD, awake and alert  NC/AT, face grossly symmetric  EOMI, PERRL  V1-3 intact  nares clear anteriorly, no active bleeding  merocel on R  PIERCE, wwp    A/P:  48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

## 2018-03-12 LAB — SURGICAL PATHOLOGY STUDY: SIGNIFICANT CHANGE UP

## 2018-03-19 ENCOUNTER — APPOINTMENT (OUTPATIENT)
Dept: OTOLARYNGOLOGY | Facility: CLINIC | Age: 48
End: 2018-03-19
Payer: COMMERCIAL

## 2018-03-19 DIAGNOSIS — C41.1 MALIGNANT NEOPLASM OF MANDIBLE: ICD-10-CM

## 2018-03-19 PROCEDURE — 99024 POSTOP FOLLOW-UP VISIT: CPT

## 2018-03-19 PROCEDURE — 31231 NASAL ENDOSCOPY DX: CPT | Mod: 58

## 2018-03-19 RX ORDER — SUCRALFATE 1 G/10ML
1 SUSPENSION ORAL
Qty: 450 | Refills: 0 | Status: ACTIVE | COMMUNITY
Start: 2017-11-06

## 2018-03-19 RX ORDER — NAPROXEN 500 MG/1
500 TABLET ORAL
Qty: 60 | Refills: 0 | Status: ACTIVE | COMMUNITY
Start: 2017-11-08

## 2018-05-09 ENCOUNTER — EMERGENCY (EMERGENCY)
Facility: HOSPITAL | Age: 48
LOS: 1 days | Discharge: HOME | End: 2018-05-09
Attending: EMERGENCY MEDICINE | Admitting: EMERGENCY MEDICINE
Payer: COMMERCIAL

## 2018-05-09 VITALS
SYSTOLIC BLOOD PRESSURE: 138 MMHG | RESPIRATION RATE: 18 BRPM | OXYGEN SATURATION: 100 % | TEMPERATURE: 98 F | HEART RATE: 85 BPM | DIASTOLIC BLOOD PRESSURE: 90 MMHG

## 2018-05-09 VITALS
SYSTOLIC BLOOD PRESSURE: 162 MMHG | WEIGHT: 227.96 LBS | HEART RATE: 94 BPM | TEMPERATURE: 97 F | HEIGHT: 66 IN | RESPIRATION RATE: 18 BRPM | OXYGEN SATURATION: 100 % | DIASTOLIC BLOOD PRESSURE: 96 MMHG

## 2018-05-09 DIAGNOSIS — G89.29 OTHER CHRONIC PAIN: ICD-10-CM

## 2018-05-09 DIAGNOSIS — Z41.9 ENCOUNTER FOR PROCEDURE FOR PURPOSES OTHER THAN REMEDYING HEALTH STATE, UNSPECIFIED: Chronic | ICD-10-CM

## 2018-05-09 DIAGNOSIS — R51 HEADACHE: ICD-10-CM

## 2018-05-09 DIAGNOSIS — Z79.891 LONG TERM (CURRENT) USE OF OPIATE ANALGESIC: ICD-10-CM

## 2018-05-09 DIAGNOSIS — Z98.890 OTHER SPECIFIED POSTPROCEDURAL STATES: Chronic | ICD-10-CM

## 2018-05-09 DIAGNOSIS — I10 ESSENTIAL (PRIMARY) HYPERTENSION: ICD-10-CM

## 2018-05-09 DIAGNOSIS — Z88.6 ALLERGY STATUS TO ANALGESIC AGENT: ICD-10-CM

## 2018-05-09 DIAGNOSIS — Z79.899 OTHER LONG TERM (CURRENT) DRUG THERAPY: ICD-10-CM

## 2018-05-09 LAB
ALBUMIN SERPL ELPH-MCNC: 4.2 G/DL — SIGNIFICANT CHANGE UP (ref 3.3–5)
ALP SERPL-CCNC: 64 U/L — SIGNIFICANT CHANGE UP (ref 40–120)
ALT FLD-CCNC: 11 U/L — SIGNIFICANT CHANGE UP (ref 10–45)
ANION GAP SERPL CALC-SCNC: 18 MMOL/L — HIGH (ref 5–17)
AST SERPL-CCNC: 13 U/L — SIGNIFICANT CHANGE UP (ref 10–40)
BASOPHILS NFR BLD AUTO: 0.2 % — SIGNIFICANT CHANGE UP (ref 0–2)
BILIRUB SERPL-MCNC: 0.7 MG/DL — SIGNIFICANT CHANGE UP (ref 0.2–1.2)
BUN SERPL-MCNC: 16 MG/DL — SIGNIFICANT CHANGE UP (ref 7–23)
CALCIUM SERPL-MCNC: 9.7 MG/DL — SIGNIFICANT CHANGE UP (ref 8.4–10.5)
CHLORIDE SERPL-SCNC: 99 MMOL/L — SIGNIFICANT CHANGE UP (ref 96–108)
CO2 SERPL-SCNC: 23 MMOL/L — SIGNIFICANT CHANGE UP (ref 22–31)
CREAT SERPL-MCNC: 0.64 MG/DL — SIGNIFICANT CHANGE UP (ref 0.5–1.3)
EOSINOPHIL NFR BLD AUTO: 0.1 % — SIGNIFICANT CHANGE UP (ref 0–6)
EXTRA BLUE TOP TUBE: SIGNIFICANT CHANGE UP
EXTRA SST TUBE: SIGNIFICANT CHANGE UP
GLUCOSE SERPL-MCNC: 122 MG/DL — HIGH (ref 70–99)
HCT VFR BLD CALC: 36.9 % — SIGNIFICANT CHANGE UP (ref 34.5–45)
HGB BLD-MCNC: 11.9 G/DL — SIGNIFICANT CHANGE UP (ref 11.5–15.5)
LYMPHOCYTES # BLD AUTO: 8.6 % — LOW (ref 13–44)
MCHC RBC-ENTMCNC: 24.1 PG — LOW (ref 27–34)
MCHC RBC-ENTMCNC: 32.2 G/DL — SIGNIFICANT CHANGE UP (ref 32–36)
MCV RBC AUTO: 74.8 FL — LOW (ref 80–100)
MONOCYTES NFR BLD AUTO: 6.3 % — SIGNIFICANT CHANGE UP (ref 2–14)
NEUTROPHILS NFR BLD AUTO: 84.8 % — HIGH (ref 43–77)
PLATELET # BLD AUTO: 180 K/UL — SIGNIFICANT CHANGE UP (ref 150–400)
POTASSIUM SERPL-MCNC: 2.9 MMOL/L — CRITICAL LOW (ref 3.5–5.3)
POTASSIUM SERPL-SCNC: 2.9 MMOL/L — CRITICAL LOW (ref 3.5–5.3)
PROT SERPL-MCNC: 7.9 G/DL — SIGNIFICANT CHANGE UP (ref 6–8.3)
RBC # BLD: 4.93 M/UL — SIGNIFICANT CHANGE UP (ref 3.8–5.2)
RBC # FLD: 15.5 % — SIGNIFICANT CHANGE UP (ref 10.3–16.9)
SODIUM SERPL-SCNC: 140 MMOL/L — SIGNIFICANT CHANGE UP (ref 135–145)
WBC # BLD: 11 K/UL — HIGH (ref 3.8–10.5)
WBC # FLD AUTO: 11 K/UL — HIGH (ref 3.8–10.5)

## 2018-05-09 PROCEDURE — 70450 CT HEAD/BRAIN W/O DYE: CPT | Mod: 26

## 2018-05-09 PROCEDURE — 70492 CT SFT TSUE NCK W/O & W/DYE: CPT | Mod: 26

## 2018-05-09 PROCEDURE — 70488 CT MAXILLOFACIAL W/O & W/DYE: CPT

## 2018-05-09 PROCEDURE — 96375 TX/PRO/DX INJ NEW DRUG ADDON: CPT | Mod: XU

## 2018-05-09 PROCEDURE — 70488 CT MAXILLOFACIAL W/O & W/DYE: CPT | Mod: 26

## 2018-05-09 PROCEDURE — 70492 CT SFT TSUE NCK W/O & W/DYE: CPT

## 2018-05-09 PROCEDURE — 70450 CT HEAD/BRAIN W/O DYE: CPT

## 2018-05-09 PROCEDURE — 99284 EMERGENCY DEPT VISIT MOD MDM: CPT

## 2018-05-09 PROCEDURE — 96374 THER/PROPH/DIAG INJ IV PUSH: CPT | Mod: XU

## 2018-05-09 PROCEDURE — 85025 COMPLETE CBC W/AUTO DIFF WBC: CPT

## 2018-05-09 PROCEDURE — 36415 COLL VENOUS BLD VENIPUNCTURE: CPT

## 2018-05-09 PROCEDURE — 80053 COMPREHEN METABOLIC PANEL: CPT

## 2018-05-09 PROCEDURE — 99284 EMERGENCY DEPT VISIT MOD MDM: CPT | Mod: 25

## 2018-05-09 RX ORDER — METOCLOPRAMIDE HCL 10 MG
10 TABLET ORAL ONCE
Qty: 0 | Refills: 0 | Status: COMPLETED | OUTPATIENT
Start: 2018-05-09 | End: 2018-05-09

## 2018-05-09 RX ORDER — ACETAMINOPHEN 500 MG
1000 TABLET ORAL ONCE
Qty: 0 | Refills: 0 | Status: COMPLETED | OUTPATIENT
Start: 2018-05-09 | End: 2018-05-09

## 2018-05-09 RX ORDER — POTASSIUM CHLORIDE 20 MEQ
40 PACKET (EA) ORAL ONCE
Qty: 0 | Refills: 0 | Status: COMPLETED | OUTPATIENT
Start: 2018-05-09 | End: 2018-05-09

## 2018-05-09 RX ORDER — DIPHENHYDRAMINE HCL 50 MG
25 CAPSULE ORAL ONCE
Qty: 0 | Refills: 0 | Status: COMPLETED | OUTPATIENT
Start: 2018-05-09 | End: 2018-05-09

## 2018-05-09 RX ORDER — OXYCODONE HYDROCHLORIDE 5 MG/1
10 TABLET ORAL ONCE
Qty: 0 | Refills: 0 | Status: DISCONTINUED | OUTPATIENT
Start: 2018-05-09 | End: 2018-05-09

## 2018-05-09 RX ORDER — POTASSIUM CHLORIDE 20 MEQ
10 PACKET (EA) ORAL ONCE
Qty: 0 | Refills: 0 | Status: COMPLETED | OUTPATIENT
Start: 2018-05-09 | End: 2018-05-09

## 2018-05-09 RX ADMIN — Medication 10 MILLIGRAM(S): at 19:28

## 2018-05-09 RX ADMIN — Medication 100 MILLIEQUIVALENT(S): at 20:51

## 2018-05-09 RX ADMIN — Medication 1000 MILLIGRAM(S): at 19:44

## 2018-05-09 RX ADMIN — Medication 25 MILLIGRAM(S): at 19:28

## 2018-05-09 RX ADMIN — Medication 400 MILLIGRAM(S): at 19:28

## 2018-05-09 RX ADMIN — Medication 40 MILLIEQUIVALENT(S): at 20:51

## 2018-05-09 RX ADMIN — OXYCODONE HYDROCHLORIDE 10 MILLIGRAM(S): 5 TABLET ORAL at 22:38

## 2018-05-09 NOTE — ED PROVIDER NOTE - MEDICAL DECISION MAKING DETAILS
Pt p/w progressive HA, neck pain, oral inflammation c/w prior hx w/ dx malignancy, concerning for progression of disease, bony mets, less likely other acute pathology. Will consult ENT as sx began s/p bx w/ Dr Leyva. Last imaging on record performed prior to bx. Will get repeat imaging. Analgesia. Dispo pending w/u, clinical status, ENT recommendations

## 2018-05-09 NOTE — ED PROVIDER NOTE - OBJECTIVE STATEMENT
Pt w/ PMHx HTN, anxiety, SCC of R mandible sp resection, segmental mandibulectomy, R neck dissection, and reconstruction 7/2017, completed RT 12/2017, w/ f/u PET 2/2018, MR 2/2018, and CT 3/4/18 w/ concern for metastasis, s/p clival biopsy w/ Dr Leyva 3/6/18 (negative), now p/w progressively worsening HA, neck pain, tongue inflammation / irritation, hoarseness of voice, with some difficulty swallowing, since the biopsy. Pt has been seen by pain management, prescribed Gabapentin w/o relief. Pt reports she called Dr Leyva and was referred to neuro Dr Olvera, who sent pt for MRI cervical spine, and was advised she has bulging disks, but not enough to cause the pt's pain. Pt reports she feels her sx are getting worse w/ tongue inflammation, and feels it is 2/2 the Gabapentin. Pt also prescribed Lyrica of which she took 3 day worth, w/o relief. Pt has also been taking Ibuprofen. Today, pt saw a different pain management doctor, who prescribed Tramadol. Pt took both Tramadol and Gabapentin today, w/o relief. No f/c, night sweats or weight loss. No SOB, wheezing, stridor, or drooling.

## 2018-05-09 NOTE — ED PROVIDER NOTE - ENMT, MLM
Airway patent. Limited baseline mobility jaw 2/2 surgery. Limited intra-oral exam. No noted tongue swelling. No sublingual edema. Mild hoarseness of voice. No drooling or stridor.

## 2018-05-09 NOTE — ED ADULT NURSE NOTE - PSH
H/O cone biopsy of cervix    Surgery, elective  tumor resection lower right mandible and resconstructive using left fibula

## 2018-05-09 NOTE — ED PROVIDER NOTE - PROGRESS NOTE DETAILS
ENT consulted and will see the pt ENT at bedside Pt looked up on . This report was requested by: Jess Burkett | Reference #: [id number] Pt seen by ENT Pt seen by ENT - findings c/w progression of disease. Pt will need urgent f/u for probable repeat biopsy, further management. Fiberoptic laryngoscopy by ENT reveals hypomobile R vocal cord and R arytenoid swelling, likely 2/2 RT vs progression of disease. No acute intervention. CT's reviewed by ENT, d/w radiology official CT MF and soft tissue neck tomorrow. Sx for 1+ months. Pt needs urgent f/u w/ Dr Leyva for assessment and further planning. ENT d/w pt and family at bedside extensively. They are aware of progression and need for further imaging review w/ Dr Leyva, w/ further planning / treatment. They have been instructed to call the office tomorrow for appt Friday or Monday. Will give Percocet for pain given bony destruction very pain.

## 2018-05-09 NOTE — CONSULT NOTE ADULT - ASSESSMENT
A/P: 48F with hx SCC as detailed above now presenting with multifocal complaints as noted.  - Concern on imaging for progressive skull base pathology. No acute changes and airway stable.  - Will plan for expedited follow up with Dr. Leyva  - Contact ENT with any questions or concerns    Discussed with chief resident and fellow on call

## 2018-05-09 NOTE — ED ADULT NURSE NOTE - CHPI ED SYMPTOMS NEG
no nausea/no numbness/no tingling/no dizziness/no chills/no vomiting/no fever/no weakness/no decreased eating/drinking

## 2018-05-09 NOTE — ED PROVIDER NOTE - MUSCULOSKELETAL, MLM
Spine appears normal, range of motion is not limited. no midline spinal ttp. + R paraspinal cervical ttp

## 2018-05-09 NOTE — CONSULT NOTE ADULT - SUBJECTIVE AND OBJECTIVE BOX
ENT Consult Note    HPI: 48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 RT completed 12/5/17. PET scan performed in 2/2018, MRI 2/26/18, CT maxillofacial 3/4/18 with concern for metastasis. Patient taken to OR 3/6/18 for nasopharynx/clival biopsies with pathology demonstrating inflammation without metastatic disease. Patient now presents with several complaints. #1: Progressive headache. She described headache as originating occipital and radiating to frontal as well as right shoulder. #2: Tongue irritation and changes in sensation. Denies tongue swelling. #3: Dysphonia. Patient describes intermittently improving and worsening voice change. #4: Reports some dysphagia with solid foods. No dysphagia to liquids or odynophagia. #5: Double vision with far lateral left lateral gaze noted by her ophthalmologist last week. All of these above complaints have been progressive xMonths with no recent acute changes. Denies SOB.  Also notes In ED, AFVSS. CT neck and maxillofacial with contrast performed.    PE:  NAD  Breathing comfortably on RA  Voice raspy, not breathy, easy to hear and interpret  CN 2-12 grossly intact, EOMI, vision grossly intact  Right face with radiation changes to skin  Pain with external pressure to right occiput  OC/OP: mild trismus, MMM, well healed incisions, no glossoptosis  Neck soft, no masses palpated    FFL:  Nasopharynx with radiation changes  Vallecula/pyriforms wnl  Epiglottis wnl  Right arytenoid edematous, partially overhanging obscuring the right TVF  Right TVF hypomobile  Left TVF wnl  Airway patent    CT maxillofacial and neck w/ con: read pending. Imaging reviewed with concern for progressive pathology (mets?)

## 2018-05-09 NOTE — ED ADULT NURSE NOTE - OBJECTIVE STATEMENT
Pt presents to ED A&Ox3 c/o posterior headache radiating to top of head, and pain radiating down right shoulder since March, worsening over the past few weeks. pt with hx of oral cancer, had tumor removed from lower mandible and radiation, last tx in Dec 2016. pt also reports tongue swelling x 2 weeks with some difficulty swallowing. denies difficulty breathing, cp/sob, fever, n/v/d, dizziness, cough. pt has been taking gabapentin and tramadol with no relief of pain.

## 2018-05-09 NOTE — ED ADULT TRIAGE NOTE - CHIEF COMPLAINT QUOTE
Patient c/o headache and  neck pain radiating to shoulder since march got worse for 4 days , also with tongue swelling for 2 weeks . History of oral cancer . Last radiation Dec 2017 .

## 2018-05-10 RX ORDER — ONDANSETRON 4 MG/1
4 TABLET ORAL EVERY 6 HOURS
Qty: 15 | Refills: 0 | Status: ACTIVE | COMMUNITY
Start: 2018-05-10 | End: 1900-01-01

## 2018-05-10 RX ORDER — ONDANSETRON HYDROCHLORIDE 24 MG/1
24 TABLET, FILM COATED ORAL EVERY 6 HOURS
Qty: 15 | Refills: 0 | Status: DISCONTINUED | COMMUNITY
Start: 2018-05-10 | End: 2018-05-10

## 2018-05-13 ENCOUNTER — FORM ENCOUNTER (OUTPATIENT)
Age: 48
End: 2018-05-13

## 2018-05-14 ENCOUNTER — APPOINTMENT (OUTPATIENT)
Dept: OTOLARYNGOLOGY | Facility: CLINIC | Age: 48
End: 2018-05-14
Payer: COMMERCIAL

## 2018-05-14 ENCOUNTER — OUTPATIENT (OUTPATIENT)
Dept: OUTPATIENT SERVICES | Facility: HOSPITAL | Age: 48
LOS: 1 days | End: 2018-05-14
Payer: COMMERCIAL

## 2018-05-14 VITALS — WEIGHT: 229 LBS | HEIGHT: 66 IN | BODY MASS INDEX: 36.8 KG/M2

## 2018-05-14 DIAGNOSIS — H70.209 UNSPECIFIED PETROSITIS, UNSPECIFIED EAR: ICD-10-CM

## 2018-05-14 DIAGNOSIS — Z01.818 ENCOUNTER FOR OTHER PREPROCEDURAL EXAMINATION: ICD-10-CM

## 2018-05-14 DIAGNOSIS — Z41.9 ENCOUNTER FOR PROCEDURE FOR PURPOSES OTHER THAN REMEDYING HEALTH STATE, UNSPECIFIED: Chronic | ICD-10-CM

## 2018-05-14 DIAGNOSIS — Z98.890 OTHER SPECIFIED POSTPROCEDURAL STATES: Chronic | ICD-10-CM

## 2018-05-14 DIAGNOSIS — R11.2 NAUSEA WITH VOMITING, UNSPECIFIED: ICD-10-CM

## 2018-05-14 LAB
APPEARANCE UR: CLEAR — SIGNIFICANT CHANGE UP
APTT BLD: 33.8 SEC — SIGNIFICANT CHANGE UP (ref 27.5–37.4)
BACTERIA # UR AUTO: PRESENT /HPF
BILIRUB UR-MCNC: NEGATIVE — SIGNIFICANT CHANGE UP
COLOR SPEC: YELLOW — SIGNIFICANT CHANGE UP
DIFF PNL FLD: (no result)
EPI CELLS # UR: (no result) /HPF (ref 0–5)
GLUCOSE UR QL: NEGATIVE — SIGNIFICANT CHANGE UP
INR BLD: 1.29 — HIGH (ref 0.88–1.16)
KETONES UR-MCNC: NEGATIVE — SIGNIFICANT CHANGE UP
LEUKOCYTE ESTERASE UR-ACNC: NEGATIVE — SIGNIFICANT CHANGE UP
NITRITE UR-MCNC: NEGATIVE — SIGNIFICANT CHANGE UP
PH UR: 7 — SIGNIFICANT CHANGE UP (ref 5–8)
PROT UR-MCNC: NEGATIVE MG/DL — SIGNIFICANT CHANGE UP
PROTHROM AB SERPL-ACNC: 14.4 SEC — HIGH (ref 9.8–12.7)
RBC CASTS # UR COMP ASSIST: < 5 /HPF — SIGNIFICANT CHANGE UP
SP GR SPEC: <=1.005 — SIGNIFICANT CHANGE UP (ref 1–1.03)
UROBILINOGEN FLD QL: 0.2 E.U./DL — SIGNIFICANT CHANGE UP
WBC UR QL: < 5 /HPF — SIGNIFICANT CHANGE UP

## 2018-05-14 PROCEDURE — 71046 X-RAY EXAM CHEST 2 VIEWS: CPT | Mod: 26

## 2018-05-14 PROCEDURE — 71046 X-RAY EXAM CHEST 2 VIEWS: CPT

## 2018-05-14 PROCEDURE — 85610 PROTHROMBIN TIME: CPT

## 2018-05-14 PROCEDURE — 85730 THROMBOPLASTIN TIME PARTIAL: CPT

## 2018-05-14 PROCEDURE — 99214 OFFICE O/P EST MOD 30 MIN: CPT | Mod: 24

## 2018-05-14 PROCEDURE — 81001 URINALYSIS AUTO W/SCOPE: CPT

## 2018-05-16 VITALS
OXYGEN SATURATION: 99 % | HEART RATE: 98 BPM | DIASTOLIC BLOOD PRESSURE: 76 MMHG | HEIGHT: 66 IN | SYSTOLIC BLOOD PRESSURE: 141 MMHG | WEIGHT: 220.46 LBS | TEMPERATURE: 99 F | RESPIRATION RATE: 18 BRPM

## 2018-05-16 PROBLEM — H70.209: Status: ACTIVE | Noted: 2018-03-19

## 2018-05-16 PROBLEM — R11.2 NAUSEA AND/OR VOMITING: Status: ACTIVE | Noted: 2018-05-10

## 2018-05-17 ENCOUNTER — RESULT REVIEW (OUTPATIENT)
Age: 48
End: 2018-05-17

## 2018-05-17 ENCOUNTER — INPATIENT (INPATIENT)
Facility: HOSPITAL | Age: 48
LOS: 10 days | Discharge: HOME | DRG: 181 | End: 2018-05-28
Attending: STUDENT IN AN ORGANIZED HEALTH CARE EDUCATION/TRAINING PROGRAM | Admitting: STUDENT IN AN ORGANIZED HEALTH CARE EDUCATION/TRAINING PROGRAM
Payer: COMMERCIAL

## 2018-05-17 ENCOUNTER — APPOINTMENT (OUTPATIENT)
Dept: OTOLARYNGOLOGY | Facility: HOSPITAL | Age: 48
End: 2018-05-17
Payer: COMMERCIAL

## 2018-05-17 DIAGNOSIS — Z98.890 OTHER SPECIFIED POSTPROCEDURAL STATES: Chronic | ICD-10-CM

## 2018-05-17 DIAGNOSIS — Z41.9 ENCOUNTER FOR PROCEDURE FOR PURPOSES OTHER THAN REMEDYING HEALTH STATE, UNSPECIFIED: Chronic | ICD-10-CM

## 2018-05-17 PROCEDURE — 61607 RESECT/EXCISE CRANIAL LESION: CPT | Mod: 80,58

## 2018-05-17 PROCEDURE — 31288 NASAL/SINUS ENDOSCOPY SURG: CPT | Mod: 58,LT

## 2018-05-17 PROCEDURE — 61607 RESECT/EXCISE CRANIAL LESION: CPT | Mod: 58

## 2018-05-17 PROCEDURE — 61782 SCAN PROC CRANIAL EXTRA: CPT | Mod: 58

## 2018-05-17 RX ORDER — LOSARTAN POTASSIUM 100 MG/1
50 TABLET, FILM COATED ORAL DAILY
Qty: 0 | Refills: 0 | Status: DISCONTINUED | OUTPATIENT
Start: 2018-05-17 | End: 2018-05-26

## 2018-05-17 RX ORDER — SODIUM CHLORIDE 9 MG/ML
1000 INJECTION, SOLUTION INTRAVENOUS
Qty: 0 | Refills: 0 | Status: DISCONTINUED | OUTPATIENT
Start: 2018-05-17 | End: 2018-05-21

## 2018-05-17 RX ORDER — DEXAMETHASONE 0.5 MG/5ML
6 ELIXIR ORAL EVERY 8 HOURS
Qty: 0 | Refills: 0 | Status: COMPLETED | OUTPATIENT
Start: 2018-05-17 | End: 2018-05-19

## 2018-05-17 RX ORDER — MORPHINE SULFATE 50 MG/1
2 CAPSULE, EXTENDED RELEASE ORAL EVERY 4 HOURS
Qty: 0 | Refills: 0 | Status: DISCONTINUED | OUTPATIENT
Start: 2018-05-17 | End: 2018-05-17

## 2018-05-17 RX ORDER — OXYCODONE HYDROCHLORIDE 5 MG/1
5 TABLET ORAL EVERY 4 HOURS
Qty: 0 | Refills: 0 | Status: DISCONTINUED | OUTPATIENT
Start: 2018-05-17 | End: 2018-05-17

## 2018-05-17 RX ORDER — CEFAZOLIN SODIUM 1 G
1000 VIAL (EA) INJECTION EVERY 8 HOURS
Qty: 0 | Refills: 0 | Status: DISCONTINUED | OUTPATIENT
Start: 2018-05-17 | End: 2018-05-19

## 2018-05-17 RX ORDER — ONDANSETRON 8 MG/1
4 TABLET, FILM COATED ORAL EVERY 4 HOURS
Qty: 0 | Refills: 0 | Status: DISCONTINUED | OUTPATIENT
Start: 2018-05-17 | End: 2018-05-26

## 2018-05-17 RX ORDER — ACETAMINOPHEN 500 MG
650 TABLET ORAL EVERY 6 HOURS
Qty: 0 | Refills: 0 | Status: DISCONTINUED | OUTPATIENT
Start: 2018-05-17 | End: 2018-05-28

## 2018-05-17 RX ORDER — HYDROCHLOROTHIAZIDE 25 MG
12.5 TABLET ORAL DAILY
Qty: 0 | Refills: 0 | Status: DISCONTINUED | OUTPATIENT
Start: 2018-05-17 | End: 2018-05-23

## 2018-05-17 RX ORDER — ALPRAZOLAM 0.25 MG
0.5 TABLET ORAL EVERY 8 HOURS
Qty: 0 | Refills: 0 | Status: DISCONTINUED | OUTPATIENT
Start: 2018-05-17 | End: 2018-05-18

## 2018-05-17 RX ORDER — CYCLOBENZAPRINE HYDROCHLORIDE 10 MG/1
10 TABLET, FILM COATED ORAL DAILY
Qty: 0 | Refills: 0 | Status: COMPLETED | OUTPATIENT
Start: 2018-05-17 | End: 2018-05-23

## 2018-05-17 RX ORDER — LABETALOL HCL 100 MG
100 TABLET ORAL EVERY 8 HOURS
Qty: 0 | Refills: 0 | Status: DISCONTINUED | OUTPATIENT
Start: 2018-05-17 | End: 2018-05-22

## 2018-05-17 NOTE — PROGRESS NOTE ADULT - SUBJECTIVE AND OBJECTIVE BOX
ENT Post-Operative Note    HPI: 48F hx T4N1 SCC s/p right segmental mandibulectomy and fibula MVFF reconstruction ENT Post-Operative Note    HPI: 48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 RT completed 12/5/17. PET scan performed in 2/2018, MRI 2/26/18, CT maxillofacial 3/4/18 with concern for metastasis. Patient taken to OR 3/6/18 for nasopharynx/clival biopsies with pathology demonstrating inflammation without metastatic disease. Patient now presents with several complaints. #1: Progressive headache. She described headache as originating occipital and radiating to frontal as well as right shoulder. #2: Tongue irritation and changes in sensation. Denies tongue swelling. #3: Dysphonia. Patient describes intermittently improving and worsening voice change. #4: Reports some dysphagia with solid foods. No dysphagia to liquids or odynophagia. #5: Double vision with far lateral left lateral gaze noted by her ophthalmologist last week. All of these above complaints have been progressive xMonths with no recent acute changes. Recent CT neck w/ contrast 5/9/18 with concern for metastasis vs 2nd primary.    Interval: Patient s/p endoscopic biopsies of nasopharynx and clivus. Patient extubated in OR and transferred to PACU in stable condition. Pain controlled.    PE:  NAD  Breathing comfortably on RA  Voice raspy, not breathy, easy to hear and interpret  CN 2-12 grossly intact, EOMI, vision grossly intact except for double vision with left lateral gaze (noted preop)  Left nasal merocel in place, no bleeding appreciated  Right face with radiation changes to skin  Pain with external pressure to right occiput  OC/OP: mild trismus, MMM, well healed incisions, no glossoptosis  Neck soft, no masses palpated

## 2018-05-17 NOTE — H&P ADULT - HISTORY OF PRESENT ILLNESS
HPI: 48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 RT completed 12/5/17. PET scan performed in 2/2018, MRI 2/26/18, CT maxillofacial 3/4/18 with concern for metastasis. Patient taken to OR 3/6/18 for nasopharynx/clival biopsies with pathology demonstrating inflammation without metastatic disease. Patient now presents with several complaints. #1: Progressive headache. She described headache as originating occipital and radiating to frontal as well as right shoulder. #2: Tongue irritation and changes in sensation. Denies tongue swelling. #3: Dysphonia. Patient describes intermittently improving and worsening voice change. #4: Reports some dysphagia with solid foods. No dysphagia to liquids or odynophagia. #5: Double vision with far lateral left lateral gaze noted by her ophthalmologist last week. All of these above complaints have been progressive xMonths with no recent acute changes. Recent CT neck w/ contrast 5/9/18 with concern for metastasis vs 2nd primary.    PMH: OA, anxiety, HTN    Interval: Patient s/p endoscopic biopsies of nasopharynx and clivus. Patient extubated in OR and transferred to PACU in stable condition. Pain controlled.

## 2018-05-17 NOTE — BRIEF OPERATIVE NOTE - PROCEDURE
<<-----Click on this checkbox to enter Procedure Transnasal endoscopic partial excision of sphenoid sinus  05/17/2018    Active  BSANDERS

## 2018-05-17 NOTE — PROGRESS NOTE ADULT - ASSESSMENT
A/P: 48F s/p left endoscopic naspharynx and clivus biopsies.  - Continuous pulse ox  - Neuro checks q4h  - Pain control  - Continue home meds  - Left merocel in place  - Ancef  - Decadron 6q8  - Soft diet, IVF  - OOB, SCDs  - Head of bed elevated  - Dispo: SDU    Discussed with attending

## 2018-05-17 NOTE — H&P ADULT - NSHPPHYSICALEXAM_GEN_ALL_CORE
PE:  NAD  Breathing comfortably on RA  Voice raspy, not breathy, easy to hear and interpret  CN 2-12 grossly intact, EOMI, vision grossly intact except for double vision with left lateral gaze (noted preop)  Left nasal merocel in place, no bleeding appreciated  Right face with radiation changes to skin  Pain with external pressure to right occiput  OC/OP: mild trismus, MMM, well healed incisions, no glossoptosis  Neck soft, no masses palpated

## 2018-05-18 LAB
ANION GAP SERPL CALC-SCNC: 15 MMOL/L — SIGNIFICANT CHANGE UP (ref 5–17)
BUN SERPL-MCNC: 16 MG/DL — SIGNIFICANT CHANGE UP (ref 7–23)
CALCIUM SERPL-MCNC: 9.6 MG/DL — SIGNIFICANT CHANGE UP (ref 8.4–10.5)
CHLORIDE SERPL-SCNC: 96 MMOL/L — SIGNIFICANT CHANGE UP (ref 96–108)
CO2 SERPL-SCNC: 26 MMOL/L — SIGNIFICANT CHANGE UP (ref 22–31)
CREAT SERPL-MCNC: 0.71 MG/DL — SIGNIFICANT CHANGE UP (ref 0.5–1.3)
GLUCOSE BLDC GLUCOMTR-MCNC: 144 MG/DL — HIGH (ref 70–99)
GLUCOSE SERPL-MCNC: 128 MG/DL — HIGH (ref 70–99)
GRAM STN FLD: SIGNIFICANT CHANGE UP
HCT VFR BLD CALC: 37 % — SIGNIFICANT CHANGE UP (ref 34.5–45)
HGB BLD-MCNC: 11.8 G/DL — SIGNIFICANT CHANGE UP (ref 11.5–15.5)
MAGNESIUM SERPL-MCNC: 1.8 MG/DL — SIGNIFICANT CHANGE UP (ref 1.6–2.6)
MCHC RBC-ENTMCNC: 24.7 PG — LOW (ref 27–34)
MCHC RBC-ENTMCNC: 31.9 G/DL — LOW (ref 32–36)
MCV RBC AUTO: 77.4 FL — LOW (ref 80–100)
PHOSPHATE SERPL-MCNC: 3.1 MG/DL — SIGNIFICANT CHANGE UP (ref 2.5–4.5)
PLATELET # BLD AUTO: 198 K/UL — SIGNIFICANT CHANGE UP (ref 150–400)
POTASSIUM SERPL-MCNC: 3.7 MMOL/L — SIGNIFICANT CHANGE UP (ref 3.5–5.3)
POTASSIUM SERPL-SCNC: 3.7 MMOL/L — SIGNIFICANT CHANGE UP (ref 3.5–5.3)
RBC # BLD: 4.78 M/UL — SIGNIFICANT CHANGE UP (ref 3.8–5.2)
RBC # FLD: 16.8 % — SIGNIFICANT CHANGE UP (ref 10.3–16.9)
SODIUM SERPL-SCNC: 137 MMOL/L — SIGNIFICANT CHANGE UP (ref 135–145)
SPECIMEN SOURCE: SIGNIFICANT CHANGE UP
WBC # BLD: 12.9 K/UL — HIGH (ref 3.8–10.5)
WBC # FLD AUTO: 12.9 K/UL — HIGH (ref 3.8–10.5)

## 2018-05-18 PROCEDURE — 78815 PET IMAGE W/CT SKULL-THIGH: CPT | Mod: 26

## 2018-05-18 RX ORDER — PROPRANOLOL HCL 160 MG
20 CAPSULE, EXTENDED RELEASE 24HR ORAL ONCE
Qty: 0 | Refills: 0 | Status: COMPLETED | OUTPATIENT
Start: 2018-05-18 | End: 2018-05-18

## 2018-05-18 RX ORDER — SODIUM CHLORIDE 9 MG/ML
500 INJECTION INTRAMUSCULAR; INTRAVENOUS; SUBCUTANEOUS ONCE
Qty: 0 | Refills: 0 | Status: COMPLETED | OUTPATIENT
Start: 2018-05-18 | End: 2018-05-18

## 2018-05-18 RX ADMIN — Medication 100 MILLIGRAM(S): at 12:34

## 2018-05-18 RX ADMIN — Medication 0.5 MILLIGRAM(S): at 16:59

## 2018-05-18 RX ADMIN — Medication 100 MILLIGRAM(S): at 03:16

## 2018-05-18 RX ADMIN — Medication 6 MILLIGRAM(S): at 12:35

## 2018-05-18 RX ADMIN — Medication 0.5 MILLIGRAM(S): at 08:59

## 2018-05-18 RX ADMIN — Medication 650 MILLIGRAM(S): at 17:22

## 2018-05-18 RX ADMIN — Medication 20 MILLIGRAM(S): at 18:06

## 2018-05-18 RX ADMIN — LOSARTAN POTASSIUM 50 MILLIGRAM(S): 100 TABLET, FILM COATED ORAL at 05:55

## 2018-05-18 RX ADMIN — CYCLOBENZAPRINE HYDROCHLORIDE 10 MILLIGRAM(S): 10 TABLET, FILM COATED ORAL at 12:34

## 2018-05-18 RX ADMIN — Medication 6 MILLIGRAM(S): at 03:16

## 2018-05-18 RX ADMIN — Medication 650 MILLIGRAM(S): at 05:55

## 2018-05-18 RX ADMIN — Medication 100 MILLIGRAM(S): at 22:09

## 2018-05-18 RX ADMIN — Medication 6 MILLIGRAM(S): at 22:09

## 2018-05-18 RX ADMIN — Medication 12.5 MILLIGRAM(S): at 05:55

## 2018-05-18 RX ADMIN — Medication 650 MILLIGRAM(S): at 17:56

## 2018-05-18 RX ADMIN — SODIUM CHLORIDE 500 MILLILITER(S): 9 INJECTION INTRAMUSCULAR; INTRAVENOUS; SUBCUTANEOUS at 19:55

## 2018-05-18 NOTE — CONSULT NOTE ADULT - SUBJECTIVE AND OBJECTIVE BOX
NP note Neurosurgery Dr Mayfield    HPI: 48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 RT completed 12/5/17. PET scan performed in 2/2018, MRI 2/26/18, CT maxillofacial 3/4/18 with concern for metastasis. Patient taken to OR 3/6/18 for nasopharynx/clival biopsies with pathology demonstrating inflammation without metastatic disease. Patient now presents with several complaints. #1: Progressive headache. She described headache as originating occipital and radiating to frontal as well as right shoulder. #2: Tongue irritation and changes in sensation. Denies tongue swelling. #3: Dysphonia. Patient describes intermittently improving and worsening voice change. #4: Reports some dysphagia with solid foods. No dysphagia to liquids or odynophagia. #5: Double vision with far lateral left lateral gaze noted by her ophthalmologist last week. All of these above complaints have been progressive xMonths with no recent acute changes. Recent CT neck w/ contrast 5/9/18 with concern for metastasis vs 2nd primary.    Nasal endoscopy and biopsy of nasopharynx and clivus mass. Frozen section +SCC.	  On 5/17/2018  PMH: OA, anxiety, HTN    Allergy ASA    Home Medications:   * Patient Currently Takes Medications as of 17-May-2018 18:13 documented in Structured Notes  · 	oxyCODONE-acetaminophen 5 mg-325 mg oral tablet: 1 tab(s) orally every 4 to 6 hours, As Needed -for moderate pain - for severe pain MDD:6, Last Dose Taken: 17-May-2018 1:00 AM  · 	famotidine 40 mg/5 mL oral liquid: 40 milligram(s) orally once a day, Last Dose Taken: April, 2018   · 	acetaminophen 160 mg/5 mL oral suspension: 640 milliliter(s) orally every 6 hours, As Needed -for mild pain, Last Dose Taken: 16-May-2018 9:00 PM  · 	chlorhexidine 0.12% mucous membrane liquid: 15 milliliter(s) mucous membrane 2 times a day, Last Dose Taken: April, 2018   · 	losartan-hydrochlorothiazide 50mg-12.5mg oral tablet: 1 tab(s) orally once a day, Last Dose Taken: 17-May-2018 11:00 AM    < from: CT Head No Cont (05.09.18 @ 21:04) >  IMPRESSION:   No hydrocephalus, midline shift, acute intracranial hemorrhage or   evidence of infarction.   Aggressive large heterogeneous soft tissue lesion centered within the   clivus with associated osseous erosion.    < end of copied text >      CV: RRR  PV: peripheral pulses  Pulm: Lungs CTAB  GI: Abdomen round soft  ;Voiding without difficulty  Skin warm and dry  Neuro A&OX3 CN 2-12 grossly intact, EOMI, vision grossly intact except for double vision with left lateral gaze (noted preop)  Left nasal merocel in place, no bleeding appreciated  Right face with radiation changes to skin  Pain with external pressure to right occiput

## 2018-05-19 DIAGNOSIS — C44.92 SQUAMOUS CELL CARCINOMA OF SKIN, UNSPECIFIED: ICD-10-CM

## 2018-05-19 RX ORDER — POLYETHYLENE GLYCOL 3350 17 G/17G
17 POWDER, FOR SOLUTION ORAL ONCE
Qty: 0 | Refills: 0 | Status: COMPLETED | OUTPATIENT
Start: 2018-05-19 | End: 2018-05-19

## 2018-05-19 RX ORDER — DEXAMETHASONE 0.5 MG/5ML
2 ELIXIR ORAL EVERY 8 HOURS
Qty: 0 | Refills: 0 | Status: COMPLETED | OUTPATIENT
Start: 2018-05-20 | End: 2018-05-21

## 2018-05-19 RX ADMIN — Medication 6 MILLIGRAM(S): at 12:10

## 2018-05-19 RX ADMIN — Medication 100 MILLIGRAM(S): at 12:10

## 2018-05-19 RX ADMIN — Medication 650 MILLIGRAM(S): at 12:45

## 2018-05-19 RX ADMIN — Medication 650 MILLIGRAM(S): at 12:15

## 2018-05-19 RX ADMIN — CYCLOBENZAPRINE HYDROCHLORIDE 10 MILLIGRAM(S): 10 TABLET, FILM COATED ORAL at 12:10

## 2018-05-19 RX ADMIN — Medication 6 MILLIGRAM(S): at 06:01

## 2018-05-19 RX ADMIN — Medication 650 MILLIGRAM(S): at 23:31

## 2018-05-19 RX ADMIN — Medication 12.5 MILLIGRAM(S): at 06:02

## 2018-05-19 RX ADMIN — Medication 6 MILLIGRAM(S): at 21:25

## 2018-05-19 RX ADMIN — Medication 650 MILLIGRAM(S): at 23:02

## 2018-05-19 RX ADMIN — LOSARTAN POTASSIUM 50 MILLIGRAM(S): 100 TABLET, FILM COATED ORAL at 06:02

## 2018-05-19 RX ADMIN — POLYETHYLENE GLYCOL 3350 17 GRAM(S): 17 POWDER, FOR SOLUTION ORAL at 21:26

## 2018-05-19 RX ADMIN — Medication 100 MILLIGRAM(S): at 06:00

## 2018-05-19 NOTE — PROGRESS NOTE ADULT - ASSESSMENT
A/P: 48F s/p left endoscopic naspharynx and clivus biopsies 5/17.  - Continuous pulse ox  - Neuro checks q4h  - Pain control  - Continue home meds  - F/u final pathology  - F/u NSGY  - PET/CT 5/18 performed  - Appreciate med-onc recs  - Plan for presentation at tumor board  - Decadron taper  - Soft diet, IVF  - OOB, SCDs  - Head of bed elevated  - Dispo: SDU    Discussed with attending

## 2018-05-19 NOTE — PROGRESS NOTE ADULT - ATTENDING COMMENTS
Pathology is pending. Frozen section has revealed SCC. The patient has metastatic disease involving the odontoid process and the C5 vertebral body. The patient has to see her radiation therapist regarding possible additional radiation therapy to her skull base disease and odontoid process and C5 vertebral body metastases. If additional radiation therapy is feasible I would give weekly systemic chemotherapy with cisplatin concurrently.

## 2018-05-19 NOTE — PROGRESS NOTE ADULT - SUBJECTIVE AND OBJECTIVE BOX
The patient is a 49 yo female seen at the request of Dr. 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 The patient has a Hx. of a T4N1 SCC of the right mandible. This was resected in 7/17. She also underwent a right neck dissection and fibula MVFF reconstruction. She received proton beam RT which was completed on 12/5/17. She did not receive chemotherapy at that time. She recently developed headaches involving the back and top of her head and associated pressure in her right inner ear. She has also noted inflammation of her tongue, difficulty swallowing at times and a hoarse voice. Head CT on 5/9/18 revealed an aggressive large heterogeneous soft tissue lesion centered within the clivus with associated osseous erosion. A maxillofacial CT scan, same day, revealed progression of a lytic lesion in the odontoid process, and a new lytic lesion in the C5 vertebral body. A PET/CT on 5/18/18 revealed a large central skull base mass that was markedly hypermetabolic and   a hypermetabolic lesion in the C5 vertebral body, consistent with metastatic disease. The patient underwent nasal endoscopy and biopsy of the left sphenoid sinus and left clivus on 5/17. Pathology is pending. PMH: HTN, OA. MEDS: as noted. ALLERGIES: ASA causes hives and a rash. SOCIAL: never smoker, rare ETOH, no drugs. 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 FAM HX: no Hx. of cancer. ROS: negative but for the above.   CHEMOTHERAPY REGIMEN:        Day:                          Diet:  Protocol:                                    IVF:      MEDICATIONS  (STANDING):  ceFAZolin   IVPB 1000 milliGRAM(s) IV Intermittent every 8 hours  cyclobenzaprine 10 milliGRAM(s) Oral daily  dexamethasone  Injectable 6 milliGRAM(s) IV Push every 8 hours  hydrochlorothiazide 12.5 milliGRAM(s) Oral daily  lactated ringers. 1000 milliLiter(s) (70 mL/Hr) IV Continuous <Continuous>  losartan 50 milliGRAM(s) Oral daily    MEDICATIONS  (PRN):  acetaminophen    Suspension. 650 milliGRAM(s) Oral every 6 hours PRN Mild Pain (1 - 3)  ALPRAZolam 0.5 milliGRAM(s) Oral every 8 hours PRN patient request. Anxiety.  enalaprilat Injectable 1.25 milliGRAM(s) IV Push every 6 hours PRN SBP>160. Hold if HR<60.  labetalol 100 milliGRAM(s) Oral every 8 hours PRN SBP>160. Hold if HR<60  morphine  - Injectable 2 milliGRAM(s) IV Push every 4 hours PRN Severe Pain (7 - 10)  ondansetron Injectable 4 milliGRAM(s) IV Push every 4 hours PRN Nausea and/or Vomiting  oxyCODONE    Solution 5 milliGRAM(s) Oral every 4 hours PRN Moderate Pain (4 - 6)      Allergies    aspirin (Hives)    Intolerances        DVT Prophylaxis: [ ] YES [x ] NO      Antibiotics: [x ] YES [ ] NO    Pain Scale (1-10):       Location:    Vital Signs Last 24 Hrs  T(C): 36.5 (19 May 2018 05:00), Max: 36.8 (18 May 2018 14:47)  T(F): 97.7 (19 May 2018 05:00), Max: 98.3 (18 May 2018 14:47)  HR: 112 (18 May 2018 20:10) (100 - 164)  BP: 119/68 (18 May 2018 20:10) (119/68 - 161/87)  BP(mean): 87 (18 May 2018 20:10) (87 - 119)  RR: 17 (18 May 2018 20:10) (17 - 18)  SpO2: 96% (18 May 2018 20:10) (96% - 100%)    Drug Dosing Weight  Height (cm): 167.64 (17 May 2018 13:12)  Weight (kg): 100 (17 May 2018 13:12)  BMI (kg/m2): 35.6 (17 May 2018 13:12)  BSA (m2): 2.08 (17 May 2018 13:12)    PHYSICAL EXAM:      Constitutional: doing well post biopsy.  Eyes: conjunctiva pink.  ENMT: buccal mucosa moist. Oropharynx not visualized.  Neck: no masses.  Breasts: not examined.  Back: no SPT on percussion below the neck. The posterior neck is tender on palp.  Respiratory: chest clear.  Cardiovascular: S1>S2 at apex. RSR.  Gastrointestinal: soft, nontender, active bowel sounds. No palp liver, spleen or masses.  Genitourinary: voiding without difficulty.  Extremities: compression devices in place on the legs.  Vascular: radial pulses equal bilaterally.  Neurological: no gross focal deficits.  Skin: warm and dry.  Lymph Nodes: none palp.  Musculoskeletal: full ROM.  Psychiatric: affect normal.        URINARY CATHETER: [ ] YES [x ] NO     LABS:  CBC Full  -  ( 18 May 2018 22:20 )  WBC Count : 12.9 K/uL  Hemoglobin : 11.8 g/dL  Hematocrit : 37.0 %  Platelet Count - Automated : 198 K/uL  Mean Cell Volume : 77.4 fL  Mean Cell Hemoglobin : 24.7 pg  Mean Cell Hemoglobin Concentration : 31.9 g/dL  Auto Neutrophil # : x  Auto Lymphocyte # : x  Auto Monocyte # : x  Auto Eosinophil # : x  Auto Basophil # : x  Auto Neutrophil % : x  Auto Lymphocyte % : x  Auto Monocyte % : x  Auto Eosinophil % : x  Auto Basophil % : x    05-18    137  |  96  |  16  ----------------------------<  128<H>  3.7   |  26  |  0.71    Ca    9.6      18 May 2018 22:20  Phos  3.1     05-18  Mg     1.8     05-18            CULTURES:    RADIOLOGY & ADDITIONAL STUDIES:

## 2018-05-19 NOTE — PROGRESS NOTE ADULT - ASSESSMENT
The patient has apparent recurrence of her mandibular SCC now involving the clivus and the left sphenoid sinus.

## 2018-05-19 NOTE — PROGRESS NOTE ADULT - SUBJECTIVE AND OBJECTIVE BOX
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 RT completed 12/5/17. PET scan performed in 2/2018, MRI 2/26/18, CT maxillofacial 3/4/18 with concern for metastasis. Patient taken to OR 3/6/18 for nasopharynx/clival biopsies with pathology demonstrating inflammation without metastatic disease. Patient now presents with several complaints. #1: Progressive headache. She described headache as originating occipital and radiating to frontal as well as right shoulder. #2: Tongue irritation and changes in sensation. Denies tongue swelling. #3: Dysphonia. Patient describes intermittently improving and worsening voice change. #4: Reports some dysphagia with solid foods. No dysphagia to liquids or odynophagia. #5: Double vision with far lateral left lateral gaze noted by her ophthalmologist last week. All of these above complaints have been progressive xMonths with no recent acute changes. Recent CT neck w/ contrast 5/9/18 with concern for metastasis vs 2nd primary.    PMH: OA, anxiety, HTN    5/17: Patient s/p endoscopic biopsies of nasopharynx and clivus. Patient extubated in OR and transferred to PACU in stable condition. Frozen section concerning for SCC. Pain controlled.  5/18: No acute events. Merocel removed without bleeding. PET/CT performed. NSGY and med onc consulted. Patient informed of concerning diagnosis with subsequent tachycardia with good response s/p 1L IVF bolus.  5/19: No acute events. Tolerating PO. Ambulating. Plan to present patient at Tumor board Monday.    MEDICATIONS  (STANDING):  ceFAZolin   IVPB 1000 milliGRAM(s) IV Intermittent every 8 hours  cyclobenzaprine 10 milliGRAM(s) Oral daily  dexamethasone  Injectable 6 milliGRAM(s) IV Push every 8 hours  hydrochlorothiazide 12.5 milliGRAM(s) Oral daily  lactated ringers. 1000 milliLiter(s) (70 mL/Hr) IV Continuous <Continuous>  losartan 50 milliGRAM(s) Oral daily    MEDICATIONS  (PRN):  acetaminophen    Suspension. 650 milliGRAM(s) Oral every 6 hours PRN Mild Pain (1 - 3)  ALPRAZolam 0.5 milliGRAM(s) Oral every 8 hours PRN patient request. Anxiety.  enalaprilat Injectable 1.25 milliGRAM(s) IV Push every 6 hours PRN SBP>160. Hold if HR<60.  labetalol 100 milliGRAM(s) Oral every 8 hours PRN SBP>160. Hold if HR<60  morphine  - Injectable 2 milliGRAM(s) IV Push every 4 hours PRN Severe Pain (7 - 10)  ondansetron Injectable 4 milliGRAM(s) IV Push every 4 hours PRN Nausea and/or Vomiting  oxyCODONE    Solution 5 milliGRAM(s) Oral every 4 hours PRN Moderate Pain (4 - 6)      Allergies    aspirin (Hives)    Vital Signs Last 24 Hrs  T(C): 36.8 (19 May 2018 10:55), Max: 36.8 (18 May 2018 14:47)  T(F): 98.2 (19 May 2018 10:55), Max: 98.3 (18 May 2018 14:47)  HR: 94 (19 May 2018 08:25) (88 - 164)  BP: 126/91 (19 May 2018 08:25) (119/68 - 161/87)  BP(mean): 103 (19 May 2018 08:25) (87 - 119)  RR: 18 (19 May 2018 08:25) (17 - 18)  SpO2: 97% (19 May 2018 08:25) (96% - 99%)    PHYSICAL EXAM:  NAD  Breathing comfortably on RA  Voice raspy, not breathy, easy to hear and interpret  CN 2-12 grossly intact, EOMI, vision grossly intact except for double vision with left lateral gaze (noted preop)  Left nasal merocel in place, no bleeding appreciated  Right face with radiation changes to skin  Pain with external pressure to right occiput  OC/OP: mild trismus, MMM, well healed incisions, no glossoptosis  Neck soft, no masses palpated    LABS:                        11.8   12.9  )-----------( 198      ( 18 May 2018 22:20 )             37.0     05-18    137  |  96  |  16  ----------------------------<  128<H>  3.7   |  26  |  0.71    Ca    9.6      18 May 2018 22:20  Phos  3.1     05-18  Mg     1.8     05-18

## 2018-05-20 LAB
CULTURE RESULTS: SIGNIFICANT CHANGE UP
SPECIMEN SOURCE: SIGNIFICANT CHANGE UP

## 2018-05-20 RX ORDER — DOCUSATE SODIUM 100 MG
100 CAPSULE ORAL
Qty: 0 | Refills: 0 | Status: DISCONTINUED | OUTPATIENT
Start: 2018-05-20 | End: 2018-05-28

## 2018-05-20 RX ADMIN — Medication 2 MILLIGRAM(S): at 06:50

## 2018-05-20 RX ADMIN — Medication 2 MILLIGRAM(S): at 13:15

## 2018-05-20 RX ADMIN — LOSARTAN POTASSIUM 50 MILLIGRAM(S): 100 TABLET, FILM COATED ORAL at 06:50

## 2018-05-20 RX ADMIN — Medication 650 MILLIGRAM(S): at 15:44

## 2018-05-20 RX ADMIN — Medication 650 MILLIGRAM(S): at 16:15

## 2018-05-20 RX ADMIN — CYCLOBENZAPRINE HYDROCHLORIDE 10 MILLIGRAM(S): 10 TABLET, FILM COATED ORAL at 22:45

## 2018-05-20 RX ADMIN — Medication 12.5 MILLIGRAM(S): at 06:50

## 2018-05-20 RX ADMIN — Medication 100 MILLIGRAM(S): at 16:33

## 2018-05-20 RX ADMIN — Medication 2 MILLIGRAM(S): at 22:45

## 2018-05-20 NOTE — PROGRESS NOTE ADULT - SUBJECTIVE AND OBJECTIVE BOX
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 RT completed 12/5/17. PET scan performed in 2/2018, MRI 2/26/18, CT maxillofacial 3/4/18 with concern for metastasis. Patient taken to OR 3/6/18 for nasopharynx/clival biopsies with pathology demonstrating inflammation without metastatic disease. Patient now presents with several complaints. #1: Progressive headache. She described headache as originating occipital and radiating to frontal as well as right shoulder. #2: Tongue irritation and changes in sensation. Denies tongue swelling. #3: Dysphonia. Patient describes intermittently improving and worsening voice change. #4: Reports some dysphagia with solid foods. No dysphagia to liquids or odynophagia. #5: Double vision with far lateral left lateral gaze noted by her ophthalmologist last week. All of these above complaints have been progressive xMonths with no recent acute changes. Recent CT neck w/ contrast 5/9/18 with concern for metastasis vs 2nd primary.    PMH: OA, anxiety, HTN    5/17: Patient s/p endoscopic biopsies of nasopharynx and clivus. Patient extubated in OR and transferred to PACU in stable condition. Frozen section concerning for SCC. Pain controlled.  5/18: No acute events. Merocel removed without bleeding. PET/CT performed. NSGY and med onc consulted. Patient informed of concerning diagnosis with subsequent tachycardia with good response s/p 1L IVF bolus.  5/19: No acute events. Tolerating PO. Ambulating. Plan to present patient at Tumor board Monday.  5/20: No acute events. Tolerating PO. Ambulating. Plan to present patient at Tumor board Monday.    MEDICATIONS  (STANDING):  cyclobenzaprine 10 milliGRAM(s) Oral daily  dexamethasone  Injectable 2 milliGRAM(s) IV Push every 8 hours  hydrochlorothiazide 12.5 milliGRAM(s) Oral daily  lactated ringers. 1000 milliLiter(s) (70 mL/Hr) IV Continuous <Continuous>  losartan 50 milliGRAM(s) Oral daily    MEDICATIONS  (PRN):  acetaminophen    Suspension. 650 milliGRAM(s) Oral every 6 hours PRN Mild Pain (1 - 3)  ALPRAZolam 0.5 milliGRAM(s) Oral every 8 hours PRN patient request. Anxiety.  enalaprilat Injectable 1.25 milliGRAM(s) IV Push every 6 hours PRN SBP>160. Hold if HR<60.  labetalol 100 milliGRAM(s) Oral every 8 hours PRN SBP>160. Hold if HR<60  morphine  - Injectable 2 milliGRAM(s) IV Push every 4 hours PRN Severe Pain (7 - 10)  ondansetron Injectable 4 milliGRAM(s) IV Push every 4 hours PRN Nausea and/or Vomiting  oxyCODONE    Solution 5 milliGRAM(s) Oral every 4 hours PRN Moderate Pain (4 - 6)      Allergies    aspirin (Hives)    Vital Signs Last 24 Hrs  T(C): 37.1 (20 May 2018 09:23), Max: 37.2 (20 May 2018 05:04)  T(F): 98.8 (20 May 2018 09:23), Max: 98.9 (20 May 2018 05:04)  HR: 92 (20 May 2018 08:50) (82 - 98)  BP: 155/83 (20 May 2018 08:50) (109/65 - 155/83)  BP(mean): 110 (20 May 2018 08:50) (82 - 110)  RR: 18 (20 May 2018 08:50) (15 - 18)  SpO2: 97% (20 May 2018 08:50) (97% - 99%)    PHYSICAL EXAM:  NAD  Breathing comfortably on RA  Voice raspy, not breathy, easy to hear and interpret  CN 2-12 grossly intact, EOMI, vision grossly intact except for double vision with left lateral gaze (noted preop)  Right face with radiation changes to skin  Pain with external pressure to right occiput  OC/OP: mild trismus, MMM, well healed incisions, no glossoptosis  Neck soft, no masses palpated    LABS:                        11.8   12.9  )-----------( 198      ( 18 May 2018 22:20 )             37.0     05-18    137  |  96  |  16  ----------------------------<  128<H>  3.7   |  26  |  0.71    Ca    9.6      18 May 2018 22:20  Phos  3.1     05-18  Mg     1.8     05-18

## 2018-05-20 NOTE — PROGRESS NOTE ADULT - ATTENDING COMMENTS
Histopathologic diagnosis is pending from the clival and left sphenoid sinus biopsies. The patient's case will be discussed tomorrow in the Head and Neck Tumor Board.

## 2018-05-20 NOTE — PROGRESS NOTE ADULT - SUBJECTIVE AND OBJECTIVE BOX
The patient is resting comfortably this am, She has pain in her neck at times and also discomfort in both ears at times. She has been eating and swallowing okay.     CHEMOTHERAPY REGIMEN:        Day:                          Diet:  Protocol:                                    IVF:      MEDICATIONS  (STANDING):  cyclobenzaprine 10 milliGRAM(s) Oral daily  dexamethasone  Injectable 2 milliGRAM(s) IV Push every 8 hours  hydrochlorothiazide 12.5 milliGRAM(s) Oral daily  lactated ringers. 1000 milliLiter(s) (70 mL/Hr) IV Continuous <Continuous>  losartan 50 milliGRAM(s) Oral daily    MEDICATIONS  (PRN):  acetaminophen    Suspension. 650 milliGRAM(s) Oral every 6 hours PRN Mild Pain (1 - 3)  ALPRAZolam 0.5 milliGRAM(s) Oral every 8 hours PRN patient request. Anxiety.  enalaprilat Injectable 1.25 milliGRAM(s) IV Push every 6 hours PRN SBP>160. Hold if HR<60.  labetalol 100 milliGRAM(s) Oral every 8 hours PRN SBP>160. Hold if HR<60  morphine  - Injectable 2 milliGRAM(s) IV Push every 4 hours PRN Severe Pain (7 - 10)  ondansetron Injectable 4 milliGRAM(s) IV Push every 4 hours PRN Nausea and/or Vomiting  oxyCODONE    Solution 5 milliGRAM(s) Oral every 4 hours PRN Moderate Pain (4 - 6)      Allergies    aspirin (Hives)    Intolerances        DVT Prophylaxis: [ ] YES [x ] NO      Antibiotics: [ ] YES [x ] NO    Pain Scale (1-10):       Location:    Vital Signs Last 24 Hrs  T(C): 37.2 (20 May 2018 05:04), Max: 37.2 (20 May 2018 05:04)  T(F): 98.9 (20 May 2018 05:04), Max: 98.9 (20 May 2018 05:04)  HR: 82 (20 May 2018 05:17) (82 - 98)  BP: 146/72 (20 May 2018 05:17) (109/65 - 146/72)  BP(mean): 103 (20 May 2018 05:17) (82 - 103)  RR: 16 (20 May 2018 05:17) (15 - 18)  SpO2: 99% (20 May 2018 05:17) (97% - 99%)    Drug Dosing Weight  Height (cm): 167.64 (17 May 2018 13:12)  Weight (kg): 100 (17 May 2018 13:12)  BMI (kg/m2): 35.6 (17 May 2018 13:12)  BSA (m2): 2.08 (17 May 2018 13:12)    PHYSICAL EXAM:      Constitutional: has some discomfort in her neck on turning her head.  Eyes: conjunctiva pink.  ENMT: buccal mucosa dry.  Neck: no masses palp.  Back: no SPT.  Respiratory: chest clear.  Cardiovascular: S1>S2 at apex. RSR.  Gastrointestinal: soft, nontender, active bowel sounds.  Genitourinary: voiding without difficulty.  Extremities: compression devices in place on the legs.  Neurological: no gross focal deficits.  Skin: warm and dry,  Lymph Nodes: none palp.  Musculoskeletal: full ROM.  Psychiatric: affect normal.      URINARY CATHETER: [ ] YES [ x] NO     LABS:  CBC Full  -  ( 18 May 2018 22:20 )  WBC Count : 12.9 K/uL  Hemoglobin : 11.8 g/dL  Hematocrit : 37.0 %  Platelet Count - Automated : 198 K/uL  Mean Cell Volume : 77.4 fL  Mean Cell Hemoglobin : 24.7 pg  Mean Cell Hemoglobin Concentration : 31.9 g/dL  Auto Neutrophil # : x  Auto Lymphocyte # : x  Auto Monocyte # : x  Auto Eosinophil # : x  Auto Basophil # : x  Auto Neutrophil % : x  Auto Lymphocyte % : x  Auto Monocyte % : x  Auto Eosinophil % : x  Auto Basophil % : x    05-18    137  |  96  |  16  ----------------------------<  128<H>  3.7   |  26  |  0.71    Ca    9.6      18 May 2018 22:20  Phos  3.1     05-18  Mg     1.8     05-18            CULTURES:    RADIOLOGY & ADDITIONAL STUDIES:

## 2018-05-21 DIAGNOSIS — F41.9 ANXIETY DISORDER, UNSPECIFIED: ICD-10-CM

## 2018-05-21 DIAGNOSIS — R00.0 TACHYCARDIA, UNSPECIFIED: ICD-10-CM

## 2018-05-21 DIAGNOSIS — C44.329 SQUAMOUS CELL CARCINOMA OF SKIN OF OTHER PARTS OF FACE: ICD-10-CM

## 2018-05-21 DIAGNOSIS — K21.9 GASTRO-ESOPHAGEAL REFLUX DISEASE WITHOUT ESOPHAGITIS: ICD-10-CM

## 2018-05-21 DIAGNOSIS — I10 ESSENTIAL (PRIMARY) HYPERTENSION: ICD-10-CM

## 2018-05-21 DIAGNOSIS — Z29.9 ENCOUNTER FOR PROPHYLACTIC MEASURES, UNSPECIFIED: ICD-10-CM

## 2018-05-21 PROCEDURE — 70543 MRI ORBT/FAC/NCK W/O &W/DYE: CPT | Mod: 26

## 2018-05-21 PROCEDURE — 99254 IP/OBS CNSLTJ NEW/EST MOD 60: CPT

## 2018-05-21 RX ORDER — HEPARIN SODIUM 5000 [USP'U]/ML
7500 INJECTION INTRAVENOUS; SUBCUTANEOUS EVERY 8 HOURS
Qty: 0 | Refills: 0 | Status: DISCONTINUED | OUTPATIENT
Start: 2018-05-21 | End: 2018-05-22

## 2018-05-21 RX ORDER — HEPARIN SODIUM 5000 [USP'U]/ML
5000 INJECTION INTRAVENOUS; SUBCUTANEOUS EVERY 8 HOURS
Qty: 0 | Refills: 0 | Status: DISCONTINUED | OUTPATIENT
Start: 2018-05-21 | End: 2018-05-21

## 2018-05-21 RX ORDER — ALPRAZOLAM 0.25 MG
1 TABLET ORAL ONCE
Qty: 0 | Refills: 0 | Status: DISCONTINUED | OUTPATIENT
Start: 2018-05-21 | End: 2018-05-21

## 2018-05-21 RX ADMIN — Medication 2 MILLIGRAM(S): at 21:33

## 2018-05-21 RX ADMIN — Medication 2 MILLIGRAM(S): at 06:42

## 2018-05-21 RX ADMIN — Medication 650 MILLIGRAM(S): at 01:21

## 2018-05-21 RX ADMIN — HEPARIN SODIUM 7500 UNIT(S): 5000 INJECTION INTRAVENOUS; SUBCUTANEOUS at 14:31

## 2018-05-21 RX ADMIN — Medication 100 MILLIGRAM(S): at 17:54

## 2018-05-21 RX ADMIN — Medication 12.5 MILLIGRAM(S): at 06:42

## 2018-05-21 RX ADMIN — Medication 1 MILLIGRAM(S): at 21:33

## 2018-05-21 RX ADMIN — Medication 650 MILLIGRAM(S): at 16:24

## 2018-05-21 RX ADMIN — Medication 2 MILLIGRAM(S): at 14:31

## 2018-05-21 RX ADMIN — Medication 650 MILLIGRAM(S): at 17:56

## 2018-05-21 RX ADMIN — HEPARIN SODIUM 7500 UNIT(S): 5000 INJECTION INTRAVENOUS; SUBCUTANEOUS at 21:32

## 2018-05-21 RX ADMIN — LOSARTAN POTASSIUM 50 MILLIGRAM(S): 100 TABLET, FILM COATED ORAL at 06:42

## 2018-05-21 NOTE — CONSULT NOTE ADULT - PROBLEM SELECTOR RECOMMENDATION 2
would discontinue PRN medications w/plan to increase standing medications as needed, pt has not required PRN Enalaprilat or BB during this admission   -would continue with HTZ 12.5mg daily, Cozaar 50mg daily  -would discontinue Vasotec/Enalaprilat and Labetolol 100mg both ordered as PRN

## 2018-05-21 NOTE — PROGRESS NOTE ADULT - ATTENDING COMMENTS
Plan discussed with Dr. CARLEY Roads, Heme/Onc Fellow. The patient will have to be transferred to [address] [place] [address] where chemotherapy can be given.

## 2018-05-21 NOTE — PROGRESS NOTE ADULT - SUBJECTIVE AND OBJECTIVE BOX
The patient continues to have neck pain at times.    CHEMOTHERAPY REGIMEN:        Day:                          Diet:  Protocol: docetaxel, cisplatin, infusional 5-FU is planned.                                   IVF:      MEDICATIONS  (STANDING):  cyclobenzaprine 10 milliGRAM(s) Oral daily  dexamethasone  Injectable 2 milliGRAM(s) IV Push every 8 hours  docusate sodium 100 milliGRAM(s) Oral two times a day  hydrochlorothiazide 12.5 milliGRAM(s) Oral daily  lactated ringers. 1000 milliLiter(s) (70 mL/Hr) IV Continuous <Continuous>  losartan 50 milliGRAM(s) Oral daily    MEDICATIONS  (PRN):  acetaminophen    Suspension. 650 milliGRAM(s) Oral every 6 hours PRN Mild Pain (1 - 3)  ALPRAZolam 0.5 milliGRAM(s) Oral every 8 hours PRN patient request. Anxiety.  enalaprilat Injectable 1.25 milliGRAM(s) IV Push every 6 hours PRN SBP>160. Hold if HR<60.  labetalol 100 milliGRAM(s) Oral every 8 hours PRN SBP>160. Hold if HR<60  morphine  - Injectable 2 milliGRAM(s) IV Push every 4 hours PRN Severe Pain (7 - 10)  ondansetron Injectable 4 milliGRAM(s) IV Push every 4 hours PRN Nausea and/or Vomiting  oxyCODONE    Solution 5 milliGRAM(s) Oral every 4 hours PRN Moderate Pain (4 - 6)      Allergies    aspirin (Hives)    Intolerances        DVT Prophylaxis: [ ] YES [x ] NO      Antibiotics: [ ] YES [x ] NO    Pain Scale (1-10):       Location:    Vital Signs Last 24 Hrs  T(C): 36.4 (21 May 2018 05:12), Max: 37.2 (20 May 2018 14:02)  T(F): 97.6 (21 May 2018 05:12), Max: 99 (20 May 2018 14:02)  HR: 74 (21 May 2018 01:27) (74 - 112)  BP: 148/88 (21 May 2018 01:27) (133/74 - 155/83)  BP(mean): 112 (21 May 2018 01:27) (93 - 112)  RR: 18 (21 May 2018 01:27) (18 - 18)  SpO2: 99% (21 May 2018 01:27) (97% - 100%)    Drug Dosing Weight  Height (cm): 167.64 (17 May 2018 13:12)  Weight (kg): 100 (17 May 2018 13:12)  BMI (kg/m2): 35.6 (17 May 2018 13:12)  BSA (m2): 2.08 (17 May 2018 13:12)    PHYSICAL EXAM:      Constitutional: in good spirits.    Eyes: conjunctiva pink.    ENMT: buccal mucosa dry.    Neck: no masses palp. Tender on palp over the cervical spine.        Back: no SPT.    Respiratory: chest clear.    Cardiovascular: S1>S2 at apex. RSR.    Gastrointestinal: soft, nontender active bowel sounds.    Genitourinary: voiding without difficulty.      Extremities: no leg edema.      Neurological: no gross focal deficits.    Skin: warm and dry.    Lymph Nodes: none palp.    Musculoskeletal: full ROM.    Psychiatric: affect normal.        URINARY CATHETER: [ ] YES [ x] NO     LABS:                CULTURES:    RADIOLOGY & ADDITIONAL STUDIES: The patient continues to have neck pain at times.    CHEMOTHERAPY REGIMEN:        Day:                          Diet:  Protocol: docetaxel, cisplatin, infusional 5-FU is planned.                                   IVF:      MEDICATIONS  (STANDING):  cyclobenzaprine 10 milliGRAM(s) Oral daily  dexamethasone  Injectable 2 milliGRAM(s) IV Push every 8 hours  docusate sodium 100 milliGRAM(s) Oral two times a day  hydrochlorothiazide 12.5 milliGRAM(s) Oral daily  lactated ringers. 1000 milliLiter(s) (70 mL/Hr) IV Continuous <Continuous>  losartan 50 milliGRAM(s) Oral daily    MEDICATIONS  (PRN):  acetaminophen    Suspension. 650 milliGRAM(s) Oral every 6 hours PRN Mild Pain (1 - 3)  ALPRAZolam 0.5 milliGRAM(s) Oral every 8 hours PRN patient request. Anxiety.  enalaprilat Injectable 1.25 milliGRAM(s) IV Push every 6 hours PRN SBP>160. Hold if HR<60.  labetalol 100 milliGRAM(s) Oral every 8 hours PRN SBP>160. Hold if HR<60  morphine  - Injectable 2 milliGRAM(s) IV Push every 4 hours PRN Severe Pain (7 - 10)  ondansetron Injectable 4 milliGRAM(s) IV Push every 4 hours PRN Nausea and/or Vomiting  oxyCODONE    Solution 5 milliGRAM(s) Oral every 4 hours PRN Moderate Pain (4 - 6)      Allergies    aspirin (Hives)    Intolerances        DVT Prophylaxis: [ ] YES [x ] NO      Antibiotics: [ ] YES [x ] NO    Pain Scale (1-10):       Location:    Vital Signs Last 24 Hrs  T(C): 36.4 (21 May 2018 05:12), Max: 37.2 (20 May 2018 14:02)  T(F): 97.6 (21 May 2018 05:12), Max: 99 (20 May 2018 14:02)  HR: 74 (21 May 2018 01:27) (74 - 112)  BP: 148/88 (21 May 2018 01:27) (133/74 - 155/83)  BP(mean): 112 (21 May 2018 01:27) (93 - 112)  RR: 18 (21 May 2018 01:27) (18 - 18)  SpO2: 99% (21 May 2018 01:27) (97% - 100%)    Drug Dosing Weight  Height (cm): 167.64 (17 May 2018 13:12)  Weight (kg): 100 (17 May 2018 13:12)  BMI (kg/m2): 35.6 (17 May 2018 13:12)  BSA (m2): 2.08 (17 May 2018 13:12)    PHYSICAL EXAM:      Constitutional: in good spirits.  Eyes: conjunctiva pink.  ENMT: buccal mucosa dry.  Neck: no masses palp. Tender on palp over the cervical spine.  Back: no SPT.  Respiratory: chest clear.  Cardiovascular: S1>S2 at apex. RSR.  Gastrointestinal: soft, nontender active bowel sounds.  Genitourinary: voiding without difficulty.  Extremities: no leg edema.  Neurological: no gross focal deficits.  Skin: warm and dry.  Lymph Nodes: none palp.  Musculoskeletal: full ROM.  Psychiatric: affect normal.        URINARY CATHETER: [ ] YES [ x] NO     LABS:                CULTURES:    RADIOLOGY & ADDITIONAL STUDIES:

## 2018-05-21 NOTE — PROGRESS NOTE ADULT - ASSESSMENT
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 Pt had localized RT for HNSCC 1 year ago. Case was discussed at multidisciplinary head and neck tumor board and given that there will be significant delay in mapping her out for RT as well as mets to C5, we will start her on induction chemotherapy w/ DCF.     #Head and Neck Squamous Cell Carcinoma  Recurrent Head and Neck Squamous Cell Carcinoma, initially treated with RT 1 year ago. New mass in the clivus with mets to odontoid process and C5  -MRI Face/Orbits with contrast today  -PICC line placement for induction chemotherapy tomorrow  -Had extensive discussion w/ patient and mother today about the regimen as well as other options for her moving forward. Pt was wondering about vitamin therapy as well which we do not do here. If pt is agreeable to chemotherapy, we will do induction DCF (Docetaxel: 75 mg/m2, Cisplatin: 75 mg/m2 and 5-FU: 1000 mg/m2). Adverse effects discussed in detail with patient.   -After completion of induction chemotherapy, patient will be evaluated by RT for possible concurrent chemoRT  -Transfer to [address] [place] [address] D/c telemetry  -Recommend DVT prophylaxis with lovenox     #Leukocytosis  Likely reactive in the setting of active malignancy  CTM    #Anemia  Pt has microcytosis, Hb normal but lower limit of normal  -Recommend checking iron studies (ferritin, TIBC, transferrin, and serum iron) as well b12 and folate     Case discussed with ENT resident and Dr. Yi

## 2018-05-21 NOTE — CONSULT NOTE ADULT - ASSESSMENT
A/Large heterogenous sof tissue lesion centered within the clivus    P/  Monitor neuro status  OT/PT  F/U PET scan   Continue current medical regime as per Primary team  Will discuss and review images with Dr. Mayfield
48F with pmhx of OA, HTN, anxiety and T4N1 SCC of right mandible s/p segmental mandibulectomy, right neck dissection, and fibula MVFF reconstruction (7/2017), RT (12/5/17) who presented with headaches, tongue pain, double vision and dysphagia now with concern for SCC involvement of clivus and the left sphenoid sinus, medicine consulted for transfer to  for chemotherapy.

## 2018-05-21 NOTE — PROGRESS NOTE ADULT - ASSESSMENT
48F with pmhx of OA, HTN, anxiety and T4N1 SCC of right mandible s/p segmental mandibulectomy, right neck dissection, and fibula MVFF reconstruction (7/2017), RT (12/5/17) who presented with headaches, tongue pain, double vision and dysphagia now with concern for SCC involvement of clivus and the left sphenoid sinus, medicine consulted for transfer to  for chemotherapy.

## 2018-05-21 NOTE — PROGRESS NOTE ADULT - ASSESSMENT
The patient's case was presented at the Head and Neck Tumor Board this am. The plan for this patient is to begin induction chemotherapy while additional RT options are being explored. In this regard, I would like to obtain an MRI of the orbits face neck and to have a PICC line placed. The patient and I discussed the regimen of docetaxel, cisplatin and infusional 5-FU. Side effects of this chemotherapy including but not limited to hair loss, which can be permanent in 10% of cases, nausea, vomiting, soreness in the mouth, diarrhea, hearing loss, paresthesias, kidney damage, nail changes, fluid retention, low WBC count with possible infection and low platelet count with possible bleeding were all discussed with the patient. Twenty-four hours after completion of the 5-FU infusion the patient will then receive neulasta 6mg SQ.

## 2018-05-21 NOTE — CONSULT NOTE ADULT - PROBLEM SELECTOR RECOMMENDATION 3
well controlled at this time  -would c/w Xanaex 0.5mg Q 8 hrs PRN -on telemetry w/evidence of sinus tachycardia likely multifactorial from dehydration from decreased PO intake on initial presentation and anxiety with diagnosis of new lesions w/need for chemotherapy, now well controlled  -would f/u with EKG  -recommend transfer to [facility] w/monitoring of HR w/scheduled vital signs  -encourage PO intake  -would ensure adequate anxiety management (see below)

## 2018-05-21 NOTE — CONSULT NOTE ADULT - ATTENDING COMMENTS
Pt seen and examined, VSS, exam as above, agree with assessment per Dr Solorzano.  Will transfer to [facility] for CTX.

## 2018-05-21 NOTE — CONSULT NOTE ADULT - SUBJECTIVE AND OBJECTIVE BOX
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 RT completed 12/5/17. PET scan performed in 2/2018, MRI 2/26/18, CT maxillofacial 3/4/18 with concern for metastasis. Patient taken to OR 3/6/18 for nasopharynx/clival biopsies with pathology demonstrating inflammation without metastatic disease. Patient now presents with several complaints. #1: Progressive headache. She described headache as originating occipital and radiating to frontal as well as right shoulder. #2: Tongue irritation and changes in sensation. Denies tongue swelling. #3: Dysphonia. Patient describes intermittently improving and worsening voice change. #4: Reports some dysphagia with solid foods. No dysphagia to liquids or odynophagia. #5: Double vision with far lateral left lateral gaze noted by her ophthalmologist last week. All of these above complaints have been progressive xMonths with no recent acute changes. Recent CT neck w/ contrast 5/9/18 with concern for metastasis vs 2nd primary.      Home Medications:     Past Medical History:     Surgical History:    Family History:     Social History:      REVIEW OF SYSTEMS:  CONSTITUTIONAL: No fever, weight loss, or fatigue  EYES: No eye pain, visual disturbances, or discharge  ENMT:  No difficulty hearing, tinnitus, vertigo; No sinus or throat pain  NECK: No pain or stiffness  BREASTS: No pain, masses, or nipple discharge  RESPIRATORY: No cough, wheezing, chills or hemoptysis; No shortness of breath  CARDIOVASCULAR: No chest pain, palpitations, dizziness, or leg swelling  GASTROINTESTINAL: No abdominal or epigastric pain. No nausea, vomiting, or hematemesis; No diarrhea or constipation. No melena or hematochezia.  GENITOURINARY: No dysuria, frequency, hematuria, or incontinence  NEUROLOGICAL: No headaches, memory loss, loss of strength, numbness, or tremors  SKIN: No itching, burning, rashes, or lesions   LYMPH NODES: No enlarged glands  ENDOCRINE: No heat or cold intolerance; No hair loss  MUSCULOSKELETAL: No joint pain or swelling; No muscle, back, or extremity pain  PSYCHIATRIC: No depression, anxiety, mood swings, or difficulty sleeping  HEME/LYMPH: No easy bruising, or bleeding gums  ALLERY AND IMMUNOLOGIC: No hives or eczema    T(C): 36.7 (05-21-18 @ 14:44), Max: 37.1 (05-20-18 @ 17:03)  HR: 84 (05-21-18 @ 12:35) (74 - 100)  BP: 141/80 (05-21-18 @ 12:35) (133/74 - 154/74)  RR: 18 (05-21-18 @ 12:35) (18 - 18)  SpO2: 98% (05-21-18 @ 12:35) (97% - 100%)  Wt(kg): --Vital Signs Last 24 Hrs  T(C): 36.7 (21 May 2018 14:44), Max: 37.1 (20 May 2018 17:03)  T(F): 98.1 (21 May 2018 14:44), Max: 98.7 (20 May 2018 17:03)  HR: 84 (21 May 2018 12:35) (74 - 100)  BP: 141/80 (21 May 2018 12:35) (133/74 - 154/74)  BP(mean): 104 (21 May 2018 12:35) (97 - 112)  RR: 18 (21 May 2018 12:35) (18 - 18)  SpO2: 98% (21 May 2018 12:35) (97% - 100%)    PHYSICAL EXAM:  GENERAL: NAD, well-groomed, well-developed  HEAD:  Atraumatic, Normocephalic  EYES: EOMI, PERRLA, conjunctiva and sclera clear  ENMT: No tonsillar erythema, exudates, or enlargement; Moist mucous membranes, Good dentition, No lesions  NECK: Supple, No JVD, Normal thyroid  NERVOUS SYSTEM:  Alert & Oriented X3, Good concentration; Motor Strength 5/5 B/L upper and lower extremities; DTRs 2+ intact and symmetric  CHEST/LUNG: Clear to percussion bilaterally; No rales, rhonchi, wheezing, or rubs  HEART: Regular rate and rhythm; No murmurs, rubs, or gallops  ABDOMEN: Soft, Nontender, Nondistended; Bowel sounds present  EXTREMITIES:  2+ Peripheral Pulses, No clubbing, cyanosis, or edema  LYMPH: No lymphadenopathy noted  SKIN: No rashes or lesions    Consultant(s) Notes Reviewed:  [x ] YES  [ ] NO  Care Discussed with Consultants/Other Providers [ x] YES  [ ] NO    LABS:              CAPILLARY BLOOD GLUCOSE                RADIOLOGY & ADDITIONAL TESTS:    Imaging Personally Reviewed:  [ ] YES  [ ] NO CINDA GREGORY  48y  Female      Patient is a 48y old  Female who presents with a chief complaint of biopsy clival mass (17 May 2018 21:05)    HPI: 48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 RT completed 12/5/17 with history complicated by concern for possible metastatic disease based on PET scan performed in 2/2018, MRI 2/26/18, CT maxillofacial 3/4/18, nasopharynx/clival biopsies from 3/6/18 demonstrated inflammation without metastatic disease. Pt presented to [facility] ED with complaints progressive headache, tongue irritation and changes in sensation, dysphagia and double vision with far left lateral gaze noted by outpt ophthalmologist. Hematology/oncology consulted. Head CT on 5/9/18 revealed an aggressive large heterogeneous soft tissue lesion centered within the clivus with associated osseous erosion. A maxillofacial CT scan showed a lytic lesion in the odontoid process, and a new lytic lesion in the C5 vertebral body. A PET/CT on 5/18/18 revealed a large central skull base mass that was markedly hypermetabolic and   a hypermetabolic lesion in the C5 vertebral body, consistent with metastatic disease. The patient underwent nasal endoscopy and biopsy of the left sphenoid sinus and left clivus on 5/17. Frozen specimen shows squamous cell histology, final biopsy results pending. The patient's case was discussed with the head and neck tumor board now w/plan for induction chemotherapy as will be significant delay in mapping her out for RT as well as mets to C5. Medicine consulted for transfer to medicine for induction chemotherapy.      Home Medications:     Past Medical History:     Surgical History:    Family History:     Social History:      REVIEW OF SYSTEMS:  CONSTITUTIONAL: No fever, weight loss, or fatigue  EYES: No eye pain, visual disturbances, or discharge  ENMT:  No difficulty hearing, tinnitus, vertigo; No sinus or throat pain  NECK: No pain or stiffness  BREASTS: No pain, masses, or nipple discharge  RESPIRATORY: No cough, wheezing, chills or hemoptysis; No shortness of breath  CARDIOVASCULAR: No chest pain, palpitations, dizziness, or leg swelling  GASTROINTESTINAL: No abdominal or epigastric pain. No nausea, vomiting, or hematemesis; No diarrhea or constipation. No melena or hematochezia.  GENITOURINARY: No dysuria, frequency, hematuria, or incontinence  NEUROLOGICAL: No headaches, memory loss, loss of strength, numbness, or tremors  SKIN: No itching, burning, rashes, or lesions   LYMPH NODES: No enlarged glands  ENDOCRINE: No heat or cold intolerance; No hair loss  MUSCULOSKELETAL: No joint pain or swelling; No muscle, back, or extremity pain  PSYCHIATRIC: No depression, anxiety, mood swings, or difficulty sleeping  HEME/LYMPH: No easy bruising, or bleeding gums  ALLERY AND IMMUNOLOGIC: No hives or eczema    T(C): 36.7 (05-21-18 @ 14:44), Max: 37.1 (05-20-18 @ 17:03)  HR: 84 (05-21-18 @ 12:35) (74 - 100)  BP: 141/80 (05-21-18 @ 12:35) (133/74 - 154/74)  RR: 18 (05-21-18 @ 12:35) (18 - 18)  SpO2: 98% (05-21-18 @ 12:35) (97% - 100%)  Wt(kg): --Vital Signs Last 24 Hrs  T(C): 36.7 (21 May 2018 14:44), Max: 37.1 (20 May 2018 17:03)  T(F): 98.1 (21 May 2018 14:44), Max: 98.7 (20 May 2018 17:03)  HR: 84 (21 May 2018 12:35) (74 - 100)  BP: 141/80 (21 May 2018 12:35) (133/74 - 154/74)  BP(mean): 104 (21 May 2018 12:35) (97 - 112)  RR: 18 (21 May 2018 12:35) (18 - 18)  SpO2: 98% (21 May 2018 12:35) (97% - 100%)    PHYSICAL EXAM:  GENERAL: NAD, well-groomed, well-developed  HEAD:  Atraumatic, Normocephalic  EYES: EOMI, PERRLA, conjunctiva and sclera clear  ENMT: No tonsillar erythema, exudates, or enlargement; Moist mucous membranes, Good dentition, No lesions  NECK: Supple, No JVD, Normal thyroid  NERVOUS SYSTEM:  Alert & Oriented X3, Good concentration; Motor Strength 5/5 B/L upper and lower extremities; DTRs 2+ intact and symmetric  CHEST/LUNG: Clear to percussion bilaterally; No rales, rhonchi, wheezing, or rubs  HEART: Regular rate and rhythm; No murmurs, rubs, or gallops  ABDOMEN: Soft, Nontender, Nondistended; Bowel sounds present  EXTREMITIES:  2+ Peripheral Pulses, No clubbing, cyanosis, or edema  LYMPH: No lymphadenopathy noted  SKIN: No rashes or lesions    Consultant(s) Notes Reviewed:  [x ] YES  [ ] NO  Care Discussed with Consultants/Other Providers [ x] YES  [ ] NO    LABS:              CAPILLARY BLOOD GLUCOSE                RADIOLOGY & ADDITIONAL TESTS:    Imaging Personally Reviewed:  [ ] YES  [ ] NO CINDA GREGORY  48y  Female      Patient is a 48y old  Female who presents with a chief complaint of biopsy clival mass (17 May 2018 21:05)    HPI: 48F with pmhx of OA, HTN, anxiety and T4N1 SCC of right mandible s/p segmental mandibulectomy, right neck dissection, and fibula MVFF reconstruction 7/2017. RT completed 12/5/17 with history complicated by concern for possible metastatic disease based on PET scan performed in 2/2018, MRI 2/26/18, CT maxillofacial 3/4/18, nasopharynx/clival biopsies from 3/6/18 demonstrated inflammation without metastatic disease. Pt presented to [facility] ED with complaints progressive headache, tongue irritation and changes in sensation, dysphagia and double vision with far left lateral gaze noted by outpt ophthalmologist. Hematology/oncology consulted. Head CT on 5/9/18 revealed an aggressive large heterogeneous soft tissue lesion centered within the clivus with associated osseous erosion. A maxillofacial CT scan showed a lytic lesion in the odontoid process, and a new lytic lesion in the C5 vertebral body. A PET/CT on 5/18/18 revealed a large central skull base mass that was markedly hypermetabolic and a hypermetabolic lesion in the C5 vertebral body, consistent with metastatic disease. The patient underwent nasal endoscopy and biopsy of the left sphenoid sinus and left clivus on 5/17. Frozen specimen shows squamous cell histology, final biopsy results pending. The patient's case was discussed with the head and neck tumor board now w/plan for induction chemotherapy as will be significant delay in mapping her out for RT as well as mets to C5. Medicine consulted for transfer to medicine for induction chemotherapy.      Home Medications:   	oxyCODONE-acetaminophen 5 mg-325 mg oral tablet: 1 tab(s) orally every 4 to 6 hours, As Needed -for moderate pain - for severe pain MDD:6, Last Dose Taken: 17-May-2018 1:00 AM  · 	famotidine 40 mg/5 mL oral liquid: 40 milligram(s) orally once a day, Last Dose Taken: April, 2018   · 	acetaminophen 160 mg/5 mL oral suspension: 640 milliliter(s) orally every 6 hours, As Needed -for mild pain, Last Dose Taken: 16-May-2018 9:00 PM  · 	chlorhexidine 0.12% mucous membrane liquid: 15 milliliter(s) mucous membrane 2 times a day, Last Dose Taken: April, 2018   · 	losartan-hydrochlorothiazide 50mg-12.5mg oral tablet: 1 tab(s) orally once a day, Last Dose Taken: 17-May-2018 11:00 AM    Past Medical History: OA, HTN, Anxiety, SCC (as per HPI)    Surgical History:  ovarian cyst removal, fibroid resection, head and neck surgery as per HPI    Family History: HTN, DM, CAD - paternal family members    Social History:  denies etoh use, cigarette smoking and drugs of abuse     REVIEW OF SYSTEMS:  CONSTITUTIONAL: decreased PO intake prior to admission, reports persistent mild occipital headache   EYES: No eye pain, or discharge  ENMT:  No difficulty hearing, tinnitus, vertigo;    NECK: No stiffness  RESPIRATORY: No cough, wheezing, chills or hemoptysis; No shortness of breath  CARDIOVASCULAR: No chest pain, palpitations, dizziness, or leg swelling  GASTROINTESTINAL: No abdominal or epigastric pain. No nausea, vomiting, or hematemesis; No diarrhea or constipation. No melena or hematochezia.  GENITOURINARY: No dysuria, frequency, hematuria, or incontinence  NEUROLOGICAL: No memory loss, loss of strength, numbness, or tremors  SKIN: No itching, burning,   MUSCULOSKELETAL: No joint pain or swelling; No muscle, back, or extremity pain  PSYCHIATRIC: + anxiety  HEME/LYMPH: No easy bruising, or bleeding gums  ALLERY AND IMMUNOLOGIC: No hives or eczema    T(C): 36.7 (05-21-18 @ 14:44), Max: 37.1 (05-20-18 @ 17:03)  HR: 84 (05-21-18 @ 12:35) (74 - 100)  BP: 141/80 (05-21-18 @ 12:35) (133/74 - 154/74)  RR: 18 (05-21-18 @ 12:35) (18 - 18)  SpO2: 98% (05-21-18 @ 12:35) (97% - 100%)  Wt(kg): --Vital Signs Last 24 Hrs  T(C): 36.7 (21 May 2018 14:44), Max: 37.1 (20 May 2018 17:03)  T(F): 98.1 (21 May 2018 14:44), Max: 98.7 (20 May 2018 17:03)  HR: 84 (21 May 2018 12:35) (74 - 100)  BP: 141/80 (21 May 2018 12:35) (133/74 - 154/74)  BP(mean): 104 (21 May 2018 12:35) (97 - 112)  RR: 18 (21 May 2018 12:35) (18 - 18)  SpO2: 98% (21 May 2018 12:35) (97% - 100%)    PHYSICAL EXAM:  GENERAL: NAD, well-groomed, well-developed, wearing surgical cap  HEAD:  Atraumatic, Normocephalic,   EYES: EOMI, PERRLA, conjunctiva and sclera clear  ENMT: No tonsillar erythema, exudates, or enlargement; Moist mucous membranes, Good dentition, No lesions  NECK: Supple,   NERVOUS SYSTEM:  Alert & Oriented X3, Good concentration; Motor Strength 5/5 B/L upper and lower extremities;    CHEST/LUNG: CTA bilaterally No rales, rhonchi, wheezing, or rubs  HEART: Regular rate and rhythm; No murmurs, rubs, or gallops  ABDOMEN: Soft, Nontender, Nondistended; Bowel sounds present  EXTREMITIES:  2+ Peripheral Pulses, No clubbing, cyanosis,  or edema  SKIN: hyperpigmentation noted along the right lower face and neck    LABS:  Complete Blood Count (05.18.18 @ 22:20)    WBC Count: 12.9 K/uL    RBC Count: 4.78 M/uL    Hemoglobin: 11.8 g/dL    Hematocrit: 37.0 %    Mean Cell Volume: 77.4 fL    Mean Cell Hemoglobin: 24.7 pg    Mean Cell Hemoglobin Conc: 31.9 g/dL    Red Cell Distrib Width: 16.8 %    Platelet Count - Automated: 198 K/uL    Basic Metabolic Panel - STAT (05.18.18 @ 22:20)    Sodium, Serum: 137 mmol/L    Potassium, Serum: 3.7 mmol/L    Chloride, Serum: 96 mmol/L    Carbon Dioxide, Serum: 26 mmol/L    Anion Gap, Serum: 15 mmol/L    Blood Urea Nitrogen, Serum: 16 mg/dL    Creatinine, Serum: 0.71 mg/dL    Glucose, Serum: 128 mg/dL    Calcium, Total Serum: 9.6 mg/dL    eGFR if Non : 101:       RADIOLOGY & ADDITIONAL TESTS:    Imaging Personally Reviewed:  [ X ] YES  [ ] NO CINDA GREGORY  48y Female    Patient is a 48y old  Female who presents with a chief complaint of biopsy clival mass (17 May 2018 21:05)    HPI: 48F with pmhx of OA, HTN, anxiety and T4N1 SCC of right mandible s/p segmental mandibulectomy, right neck dissection, and fibula MVFF reconstruction 7/2017. RT completed 12/5/17 with history complicated by concern for possible metastatic disease based on PET scan performed in 2/2018, MRI 2/26/18, CT maxillofacial 3/4/18, nasopharynx/clival biopsies from 3/6/18 demonstrated inflammation without metastatic disease. Pt presented to [facility] ED with complaints progressive headache, tongue irritation and changes in sensation, dysphagia and double vision with far left lateral gaze noted by outpt ophthalmologist. Hematology/oncology consulted. Head CT on 5/9/18 revealed an aggressive large heterogeneous soft tissue lesion centered within the clivus with associated osseous erosion. A maxillofacial CT scan showed a lytic lesion in the odontoid process, and a new lytic lesion in the C5 vertebral body. A PET/CT on 5/18/18 revealed a large central skull base mass that was markedly hypermetabolic and a hypermetabolic lesion in the C5 vertebral body, consistent with metastatic disease. The patient underwent nasal endoscopy and biopsy of the left sphenoid sinus and left clivus on 5/17. Frozen specimen shows squamous cell histology, final biopsy results pending. The patient's case was discussed with the head and neck tumor board now w/plan for induction chemotherapy as will be significant delay in mapping her out for RT as well as mets to C5. Medicine consulted for transfer to medicine for induction chemotherapy.      Home Medications:   	oxyCODONE-acetaminophen 5 mg-325 mg oral tablet: 1 tab(s) orally every 4 to 6 hours, As Needed -for moderate pain - for severe pain MDD:6, Last Dose Taken: 17-May-2018 1:00 AM  · 	famotidine 40 mg/5 mL oral liquid: 40 milligram(s) orally once a day, Last Dose Taken: April, 2018   · 	acetaminophen 160 mg/5 mL oral suspension: 640 milliliter(s) orally every 6 hours, As Needed -for mild pain, Last Dose Taken: 16-May-2018 9:00 PM  · 	chlorhexidine 0.12% mucous membrane liquid: 15 milliliter(s) mucous membrane 2 times a day, Last Dose Taken: April, 2018   · 	losartan-hydrochlorothiazide 50mg-12.5mg oral tablet: 1 tab(s) orally once a day, Last Dose Taken: 17-May-2018 11:00 AM    Past Medical History: OA, HTN, Anxiety, SCC (as per HPI)    Surgical History:  ovarian cyst removal, fibroid resection, head and neck surgery as per HPI    Family History: HTN, DM, CAD - paternal family members    Social History:  denies etoh use, cigarette smoking and drugs of abuse     REVIEW OF SYSTEMS:  CONSTITUTIONAL: decreased PO intake prior to admission, reports persistent mild occipital headache   EYES: No eye pain, or discharge  ENMT:  No difficulty hearing, tinnitus, vertigo;    NECK: No stiffness  RESPIRATORY: No cough, wheezing, chills or hemoptysis; No shortness of breath  CARDIOVASCULAR: No chest pain, palpitations, dizziness, or leg swelling  GASTROINTESTINAL: No abdominal or epigastric pain. No nausea, vomiting, or hematemesis; No diarrhea or constipation. No melena or hematochezia.  GENITOURINARY: No dysuria, frequency, hematuria, or incontinence  NEUROLOGICAL: No memory loss, loss of strength, numbness, or tremors  SKIN: No itching, burning,   MUSCULOSKELETAL: No joint pain or swelling; No muscle, back, or extremity pain  PSYCHIATRIC: + anxiety  HEME/LYMPH: No easy bruising, or bleeding gums  ALLERY AND IMMUNOLOGIC: No hives or eczema    T(C): 36.7 (05-21-18 @ 14:44), Max: 37.1 (05-20-18 @ 17:03)  HR: 84 (05-21-18 @ 12:35) (74 - 100)  BP: 141/80 (05-21-18 @ 12:35) (133/74 - 154/74)  RR: 18 (05-21-18 @ 12:35) (18 - 18)  SpO2: 98% (05-21-18 @ 12:35) (97% - 100%)  Wt(kg): --Vital Signs Last 24 Hrs  T(C): 36.7 (21 May 2018 14:44), Max: 37.1 (20 May 2018 17:03)  T(F): 98.1 (21 May 2018 14:44), Max: 98.7 (20 May 2018 17:03)  HR: 84 (21 May 2018 12:35) (74 - 100)  BP: 141/80 (21 May 2018 12:35) (133/74 - 154/74)  BP(mean): 104 (21 May 2018 12:35) (97 - 112)  RR: 18 (21 May 2018 12:35) (18 - 18)  SpO2: 98% (21 May 2018 12:35) (97% - 100%)    PHYSICAL EXAM:  GENERAL: NAD, well-groomed, well-developed, wearing surgical cap  HEAD:  Atraumatic, Normocephalic,   EYES: EOMI, PERRLA, conjunctiva and sclera clear  ENMT: No tonsillar erythema, exudates, or enlargement; Moist mucous membranes, Good dentition, No lesions  NECK: Supple,   NERVOUS SYSTEM:  Alert & Oriented X3, Good concentration; Motor Strength 5/5 B/L upper and lower extremities;    CHEST/LUNG: CTA bilaterally No rales, rhonchi, wheezing, or rubs  HEART: Regular rate and rhythm; No murmurs, rubs, or gallops  ABDOMEN: Soft, Nontender, Nondistended; Bowel sounds present  EXTREMITIES:  2+ Peripheral Pulses, No clubbing, cyanosis,  or edema  SKIN: hyperpigmentation noted along the right lower face and neck    LABS:  Complete Blood Count (05.18.18 @ 22:20)    WBC Count: 12.9 K/uL    RBC Count: 4.78 M/uL    Hemoglobin: 11.8 g/dL    Hematocrit: 37.0 %    Mean Cell Volume: 77.4 fL    Mean Cell Hemoglobin: 24.7 pg    Mean Cell Hemoglobin Conc: 31.9 g/dL    Red Cell Distrib Width: 16.8 %    Platelet Count - Automated: 198 K/uL    Basic Metabolic Panel - STAT (05.18.18 @ 22:20)    Sodium, Serum: 137 mmol/L    Potassium, Serum: 3.7 mmol/L    Chloride, Serum: 96 mmol/L    Carbon Dioxide, Serum: 26 mmol/L    Anion Gap, Serum: 15 mmol/L    Blood Urea Nitrogen, Serum: 16 mg/dL    Creatinine, Serum: 0.71 mg/dL    Glucose, Serum: 128 mg/dL    Calcium, Total Serum: 9.6 mg/dL    eGFR if Non : 101:       RADIOLOGY & ADDITIONAL TESTS:    Imaging Personally Reviewed:  [ X ] YES  [ ] NO

## 2018-05-21 NOTE — PROGRESS NOTE ADULT - PROBLEM SELECTOR PLAN 5
F no fluids needed  E replete as needed  N diet soft  A as tolerated   D RMF  DVT ppx: c/w lovonox F no fluids needed  E replete as needed  N diet soft  A as tolerated   D RMF  DVT ppx: Will plan to start Lovenox in setting of metastatic HNSCC

## 2018-05-21 NOTE — PROGRESS NOTE ADULT - SUBJECTIVE AND OBJECTIVE BOX
CINDA GREGORY is a 48y Female with history of HTN and osteoarthritis admitted for recurrence of SCC of the head and neck. Pt evaluated by Dr. Avitia and eventually Dr. Yi. She has a history of a T4N1 SCC of the right mandible. This was resected in 7/17. She also underwent a right neck dissection and fibula MVFF reconstruction. She received proton beam RT which was completed on 12/5/17. She did not receive chemotherapy at that time. She recently developed headaches involving the back and top of her head and associated pressure in her right inner ear. She has also noted inflammation of her tongue, difficulty swallowing at times and a hoarse voice. Head CT on 5/9/18 revealed an aggressive large heterogeneous soft tissue lesion centered within the clivus with associated osseous erosion. A maxillofacial CT scan, same day, revealed progression of a lytic lesion in the odontoid process, and a new lytic lesion in the C5 vertebral body. A PET/CT on 5/18/18 revealed a large central skull base mass that was markedly hypermetabolic and   a hypermetabolic lesion in the C5 vertebral body, consistent with metastatic disease. The patient underwent nasal endoscopy and biopsy of the left sphenoid sinus and left clivus on 5/17. Frozen specimen shows squamous cell histology, final biopsy results pending.     ROS otherwise negative     PAST MEDICAL & SURGICAL HISTORY:  Anxiety  SCC (squamous cell carcinoma): of mouth  GERD (gastroesophageal reflux disease)  HTN (hypertension)  Surgery, elective: tumor resection lower right mandible and resconstructive using left fibula  H/O cone biopsy of cervix      Allergies:  aspirin (Hives)      Medications:  heparin  Injectable 7500 Unit(s) SubCutaneous every 8 hours        Social History:    FAMILY HISTORY:  No pertinent family history in first degree relatives      PHYSICAL EXAM:    T(F): 98.1 (05-21-18 @ 14:44), Max: 98.7 (05-20-18 @ 17:03)  HR: 84 (05-21-18 @ 12:35) (74 - 100)  BP: 141/80 (05-21-18 @ 12:35) (133/74 - 154/74)  RR: 18 (05-21-18 @ 12:35) (18 - 18)  SpO2: 98% (05-21-18 @ 12:35) (97% - 100%)  Wt(kg): --    Daily     Daily     Gen: well developed, well nourished, comfortable  HEENT: normocephalic/atraumatic, no conjunctival pallor, no scleral icterus. Hyperpigmentation of lower half of face noted. Moist mucous membranes, no obvious masses noted  Neck: supple, no masses, no JVD  Cardiovascular: RR, nl S1S2, no murmurs/rubs/gallops  Respiratory: clear air entry b/l  Gastrointestinal: BS+, soft, NT/ND, no masses, no splenomegaly, no hepatomegaly, no evidence for ascites  Extremities: no clubbing/cyanosis, no edema, no calf tenderness  Vascular:  DP/PT 2+ b/l  Neurological: CN 2-12 grossly intact, no focal deficits  Skin: no rash on visible skin      Labs:

## 2018-05-21 NOTE — PROGRESS NOTE ADULT - SUBJECTIVE AND OBJECTIVE BOX
TRANSFER NOTE FROM [facility] TO [facility] FOR INDUCTION CHEMOTHERAPY        HPI: 48F with pmhx of OA, HTN, anxiety and T4N1 SCC of right mandible s/p segmental mandibulectomy, right neck dissection, and fibula MVFF reconstruction 7/2017. RT completed 12/5/17 with history complicated by concern for possible metastatic disease based on PET scan performed in 2/2018, MRI 2/26/18, CT maxillofacial 3/4/18, nasopharynx/clival biopsies from 3/6/18 demonstrated inflammation without metastatic disease. Pt presented to [facility] ED with complaints progressive headache, tongue irritation and changes in sensation, dysphagia and double vision with far left lateral gaze noted by outpt ophthalmologist. Hematology/oncology consulted. Head CT on 5/9/18 revealed an aggressive large heterogeneous soft tissue lesion centered within the clivus with associated osseous erosion. A maxillofacial CT scan showed a lytic lesion in the odontoid process, and a new lytic lesion in the C5 vertebral body. A PET/CT on 5/18/18 revealed a large central skull base mass that was markedly hypermetabolic and a hypermetabolic lesion in the C5 vertebral body, consistent with metastatic disease. The patient underwent nasal endoscopy and biopsy of the left sphenoid sinus and left clivus on 5/17. Frozen specimen shows squamous cell histology, final biopsy results pending. The patient's case was discussed with the head and neck tumor board now w/plan for induction chemotherapy as will be significant delay in mapping her out for RT as well as mets to C5. Medicine consulted for transfer to medicine for induction chemotherapy. Pt planned to get PICC line in am with subsequent initiation of chemotherapy(Docetaxel: 75 mg/m2, Cisplatin: 75 mg/m2 and 5-FU: 1000 mg/m2). Adverse effects discussed in detail with patient. After completion of induction chemotherapy, patient will be evaluated by RT for possible concurrent chemoRT      SUBJECTIVE / INTERVAL HPI: Patient seen and examined at bedside. C/o tightness on the right side of her face, says she has developed tightness and skin changes on right side of her face since receiving radiation treatment     VITAL SIGNS:  Vital Signs Last 24 Hrs  T(C): 36.9 (21 May 2018 17:12), Max: 36.9 (21 May 2018 17:12)  T(F): 98.5 (21 May 2018 17:12), Max: 98.5 (21 May 2018 17:12)  HR: 92 (21 May 2018 17:55) (74 - 100)  BP: 146/94 (21 May 2018 17:55) (133/74 - 154/74)  BP(mean): 112 (21 May 2018 17:55) (97 - 112)  RR: 16 (21 May 2018 17:55) (16 - 18)  SpO2: 97% (21 May 2018 17:55) (97% - 100%)    PHYSICAL EXAM:    General: WDWN  HEENT: NC/AT; PERRL, anicteric sclera; MMM, hyperpigmentation of right side of face noted   Neck: supple  Cardiovascular: +S1/S2; RRR  Respiratory: CTA B/L; no W/R/R  Gastrointestinal: soft, NT/ND; +BSx4  Extremities: WWP; no edema, clubbing or cyanosis  Vascular: 2+ radial, DP/PT pulses B/L  Neurological: AAOx3; no focal deficits    MEDICATIONS:  MEDICATIONS  (STANDING):  cyclobenzaprine 10 milliGRAM(s) Oral daily  dexamethasone  Injectable 2 milliGRAM(s) IV Push every 8 hours  docusate sodium 100 milliGRAM(s) Oral two times a day  heparin  Injectable 7500 Unit(s) SubCutaneous every 8 hours  hydrochlorothiazide 12.5 milliGRAM(s) Oral daily  losartan 50 milliGRAM(s) Oral daily    MEDICATIONS  (PRN):  acetaminophen    Suspension. 650 milliGRAM(s) Oral every 6 hours PRN Mild Pain (1 - 3)  ALPRAZolam 0.5 milliGRAM(s) Oral every 8 hours PRN patient request. Anxiety.  ALPRAZolam 1 milliGRAM(s) Oral once PRN anxiety/clautrophobia  enalaprilat Injectable 1.25 milliGRAM(s) IV Push every 6 hours PRN SBP>160. Hold if HR<60.  labetalol 100 milliGRAM(s) Oral every 8 hours PRN SBP>160. Hold if HR<60  morphine  - Injectable 2 milliGRAM(s) IV Push every 4 hours PRN Severe Pain (7 - 10)  ondansetron Injectable 4 milliGRAM(s) IV Push every 4 hours PRN Nausea and/or Vomiting  oxyCODONE    Solution 5 milliGRAM(s) Oral every 4 hours PRN Moderate Pain (4 - 6)      ALLERGIES:  Allergies    aspirin (Hives)    Intolerances

## 2018-05-21 NOTE — PROGRESS NOTE ADULT - PROBLEM SELECTOR PLAN 1
Plan for PICC line in AM followed by induction chemotherapy (Docetaxel: 75 mg/m2, Cisplatin: 75 mg/m2 and 5-FU: 1000 mg/m2)   - PICC line  -  MRI orbit   - Decadron taper as per ENT  - Lovenox for DVT prophylaxis   -agree with Flexeril 10mg daily for a total of 7 days for muscle spams   -Morphine 2mg PRN for severe pain and oxycodone 5mg q4hrs PRN - could consider switching to percocet if needed Plan for PICC line in AM followed by induction chemotherapy (Docetaxel: 75 mg/m2, Cisplatin: 75 mg/m2 and 5-FU: 1000 mg/m2)   - PICC line  -  MRI orbit   - Decadron taper as per ENT  - Lovenox for DVT prophylaxis   - Flexeril 10mg daily for a total of 7 days for muscle spams   -Morphine 2mg PRN for severe pain and oxycodone 5mg q4hrs PRN - could consider switching to percocet if needed

## 2018-05-21 NOTE — CONSULT NOTE ADULT - PROBLEM SELECTOR RECOMMENDATION 9
-plan as per heme/onc: induction chemotherapy scheduled  - Docetaxel: 75 mg/m2, Cisplatin: 75 mg/m2 and 5-FU: 1000 mg/m2  -agree with Lovenox for DVT prophylaxis   -recommend transfer to medicine service for induction chemotherapy under the care of the chemotherapy nurses on [address] -plan as per ENT and heme/onc: induction chemotherapy scheduled  - Docetaxel: 75 mg/m2, Cisplatin: 75 mg/m2 and 5-FU: 1000 mg/m2, pt ordered for PICC line and MRI orbit prior, [id number] as per ENT, Lovenox for DVT prophylaxis   -agree with Flexeril 10mg daily for a total of 7 days for muscle spams and Morphine 2mg PRN for severe pain and oxycodone 5mg q4hrs PRN - could consider switching to percocet if needed  -recommend transfer to medicine service for induction chemotherapy under the care of the chemotherapy nurses on [address]

## 2018-05-21 NOTE — PROGRESS NOTE ADULT - ASSESSMENT
A/P: 48F s/p left endoscopic naspharynx and clivus biopsies 5/17. Frozen section +SCC.  - Continuous pulse ox  - Neuro checks q4h  - Pain control  - Continue home meds  - F/u final pathology  - F/u NSGY  - PET/CT 5/18 performed  - Appreciate med-onc recs  - Tumor board 5/21: likely CCRT  - Decadron taper  - Soft diet, IVF  - OOB, SCDs  - Head of bed elevated  - Dispo: SDU    Discussed with attending

## 2018-05-21 NOTE — PROGRESS NOTE ADULT - SUBJECTIVE AND OBJECTIVE BOX
[facility] ENT Progress Note    HPI: 48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 RT completed 12/5/17. PET scan performed in 2/2018, MRI 2/26/18, CT maxillofacial 3/4/18 with concern for metastasis. Patient taken to OR 3/6/18 for nasopharynx/clival biopsies with pathology demonstrating inflammation without metastatic disease. Patient now presents with several complaints. #1: Progressive headache. She described headache as originating occipital and radiating to frontal as well as right shoulder. #2: Tongue irritation and changes in sensation. Denies tongue swelling. #3: Dysphonia. Patient describes intermittently improving and worsening voice change. #4: Reports some dysphagia with solid foods. No dysphagia to liquids or odynophagia. #5: Double vision with far lateral left lateral gaze noted by her ophthalmologist last week. All of these above complaints have been progressive xMonths with no recent acute changes. Recent CT neck w/ contrast 5/9/18 with concern for metastasis vs 2nd primary.    PMH: OA, anxiety, HTN    5/17: Patient s/p endoscopic biopsies of nasopharynx and clivus. Patient extubated in OR and transferred to PACU in stable condition. Frozen section concerning for SCC. Pain controlled.  5/18: No acute events. Merocel removed without bleeding. PET/CT performed. NSGY and med onc consulted. Patient informed of concerning diagnosis with subsequent tachycardia with good response s/p 1L IVF bolus.  5/19: No acute events. Tolerating PO. Ambulating. Plan to present patient at Tumor board Monday.  5/20: No acute events. Tolerating PO. Ambulating. Plan to present patient at Tumor board Monday.  5/21: No acute events. AFVSS. Tolerating PO. Ambulating. Patient presented at Head and Neck Tumor Board with likely decision for CCRT. Will await final decision by consulting services regarding timing of chemotherapy.    MEDICATIONS  (STANDING):  cyclobenzaprine 10 milliGRAM(s) Oral daily  dexamethasone  Injectable 2 milliGRAM(s) IV Push every 8 hours  docusate sodium 100 milliGRAM(s) Oral two times a day  hydrochlorothiazide 12.5 milliGRAM(s) Oral daily  lactated ringers. 1000 milliLiter(s) (70 mL/Hr) IV Continuous <Continuous>  losartan 50 milliGRAM(s) Oral daily    MEDICATIONS  (PRN):  acetaminophen    Suspension. 650 milliGRAM(s) Oral every 6 hours PRN Mild Pain (1 - 3)  ALPRAZolam 0.5 milliGRAM(s) Oral every 8 hours PRN patient request. Anxiety.  enalaprilat Injectable 1.25 milliGRAM(s) IV Push every 6 hours PRN SBP>160. Hold if HR<60.  labetalol 100 milliGRAM(s) Oral every 8 hours PRN SBP>160. Hold if HR<60  morphine  - Injectable 2 milliGRAM(s) IV Push every 4 hours PRN Severe Pain (7 - 10)  ondansetron Injectable 4 milliGRAM(s) IV Push every 4 hours PRN Nausea and/or Vomiting  oxyCODONE    Solution 5 milliGRAM(s) Oral every 4 hours PRN Moderate Pain (4 - 6)      Allergies    aspirin (Hives)    Vital Signs Last 24 Hrs  T(C): 36.4 (21 May 2018 05:12), Max: 37.2 (20 May 2018 14:02)  T(F): 97.6 (21 May 2018 05:12), Max: 99 (20 May 2018 14:02)  HR: 74 (21 May 2018 01:27) (74 - 112)  BP: 148/88 (21 May 2018 01:27) (133/74 - 148/88)  BP(mean): 112 (21 May 2018 01:27) (93 - 112)  RR: 18 (21 May 2018 01:27) (18 - 18)  SpO2: 99% (21 May 2018 01:27) (97% - 100%)    PHYSICAL EXAM:  NAD  Breathing comfortably on RA  Voice raspy, not breathy, easy to hear and interpret  CN 2-12 grossly intact, EOMI, vision grossly intact except for double vision with left lateral gaze (noted preop)  Right face with radiation changes to skin  Pain with external pressure to right occiput  OC/OP: mild trismus, MMM, well healed incisions, no glossoptosis  Neck soft, no masses palpated    LABS:                RADIOLOGY & ADDITIONAL TESTS:

## 2018-05-21 NOTE — PROGRESS NOTE ADULT - PROBLEM SELECTOR PLAN 3
Currently resolved   However on previous  telemetry evidence of sinus tachycardia likely multifactorial from dehydration from decreased PO intake on initial presentation and anxiety with diagnosis of new lesions w/need for chemotherapy, now well controlled  -c/w Xanaex 0.5mg Q 8 hrs PRN. Currently resolved   However on previous  telemetry evidence of sinus tachycardia likely multifactorial from dehydration from decreased PO intake on initial presentation and anxiety with diagnosis of new lesions w/need for chemotherapy, now well controlled  -c/w Xanax 0.5mg Q 8 hrs PRN.

## 2018-05-22 LAB
ANION GAP SERPL CALC-SCNC: 12 MMOL/L — SIGNIFICANT CHANGE UP (ref 5–17)
APTT BLD: 32 SEC — SIGNIFICANT CHANGE UP (ref 27.5–37.4)
BUN SERPL-MCNC: 16 MG/DL — SIGNIFICANT CHANGE UP (ref 7–23)
CALCIUM SERPL-MCNC: 9.3 MG/DL — SIGNIFICANT CHANGE UP (ref 8.4–10.5)
CHLORIDE SERPL-SCNC: 98 MMOL/L — SIGNIFICANT CHANGE UP (ref 96–108)
CO2 SERPL-SCNC: 29 MMOL/L — SIGNIFICANT CHANGE UP (ref 22–31)
CREAT SERPL-MCNC: 0.67 MG/DL — SIGNIFICANT CHANGE UP (ref 0.5–1.3)
GLUCOSE SERPL-MCNC: 105 MG/DL — HIGH (ref 70–99)
HCT VFR BLD CALC: 39.2 % — SIGNIFICANT CHANGE UP (ref 34.5–45)
HGB BLD-MCNC: 12.5 G/DL — SIGNIFICANT CHANGE UP (ref 11.5–15.5)
INR BLD: 1.27 — HIGH (ref 0.88–1.16)
LDH SERPL L TO P-CCNC: 137 U/L — SIGNIFICANT CHANGE UP (ref 50–242)
MAGNESIUM SERPL-MCNC: 2 MG/DL — SIGNIFICANT CHANGE UP (ref 1.6–2.6)
MCHC RBC-ENTMCNC: 24.4 PG — LOW (ref 27–34)
MCHC RBC-ENTMCNC: 31.9 G/DL — LOW (ref 32–36)
MCV RBC AUTO: 76.6 FL — LOW (ref 80–100)
PLATELET # BLD AUTO: 202 K/UL — SIGNIFICANT CHANGE UP (ref 150–400)
POTASSIUM SERPL-MCNC: 3.3 MMOL/L — LOW (ref 3.5–5.3)
POTASSIUM SERPL-SCNC: 3.3 MMOL/L — LOW (ref 3.5–5.3)
PROTHROM AB SERPL-ACNC: 14.2 SEC — HIGH (ref 9.8–12.7)
RBC # BLD: 5.12 M/UL — SIGNIFICANT CHANGE UP (ref 3.8–5.2)
RBC # FLD: 16.5 % — SIGNIFICANT CHANGE UP (ref 10.3–16.9)
SODIUM SERPL-SCNC: 139 MMOL/L — SIGNIFICANT CHANGE UP (ref 135–145)
WBC # BLD: 10.8 K/UL — HIGH (ref 3.8–10.5)
WBC # FLD AUTO: 10.8 K/UL — HIGH (ref 3.8–10.5)

## 2018-05-22 PROCEDURE — 77001 FLUOROGUIDE FOR VEIN DEVICE: CPT | Mod: 26

## 2018-05-22 PROCEDURE — 36569 INSJ PICC 5 YR+ W/O IMAGING: CPT

## 2018-05-22 PROCEDURE — 99233 SBSQ HOSP IP/OBS HIGH 50: CPT | Mod: GC

## 2018-05-22 PROCEDURE — 76937 US GUIDE VASCULAR ACCESS: CPT | Mod: 26

## 2018-05-22 RX ORDER — FOSAPREPITANT DIMEGLUMINE 150 MG/5ML
150 INJECTION, POWDER, LYOPHILIZED, FOR SOLUTION INTRAVENOUS ONCE
Qty: 0 | Refills: 0 | Status: COMPLETED | OUTPATIENT
Start: 2018-05-22 | End: 2018-05-22

## 2018-05-22 RX ORDER — DEXAMETHASONE 0.5 MG/5ML
20 ELIXIR ORAL DAILY
Qty: 0 | Refills: 0 | Status: COMPLETED | OUTPATIENT
Start: 2018-05-22 | End: 2018-05-23

## 2018-05-22 RX ORDER — ONDANSETRON 8 MG/1
16 TABLET, FILM COATED ORAL ONCE
Qty: 0 | Refills: 0 | Status: COMPLETED | OUTPATIENT
Start: 2018-05-22 | End: 2018-05-22

## 2018-05-22 RX ORDER — CISPLATIN 1 MG/ML
150 INJECTION, SOLUTION INTRAVENOUS ONCE
Qty: 0 | Refills: 0 | Status: COMPLETED | OUTPATIENT
Start: 2018-05-22 | End: 2018-05-22

## 2018-05-22 RX ORDER — SODIUM CHLORIDE 9 MG/ML
1500 INJECTION INTRAMUSCULAR; INTRAVENOUS; SUBCUTANEOUS ONCE
Qty: 0 | Refills: 0 | Status: COMPLETED | OUTPATIENT
Start: 2018-05-22 | End: 2018-05-22

## 2018-05-22 RX ORDER — FLUOROURACIL 50 MG/ML
2080 INJECTION, SOLUTION INTRAVENOUS DAILY
Qty: 0 | Refills: 0 | Status: COMPLETED | OUTPATIENT
Start: 2018-05-22 | End: 2018-05-26

## 2018-05-22 RX ORDER — POTASSIUM CHLORIDE 20 MEQ
40 PACKET (EA) ORAL EVERY 4 HOURS
Qty: 0 | Refills: 0 | Status: COMPLETED | OUTPATIENT
Start: 2018-05-22 | End: 2018-05-22

## 2018-05-22 RX ORDER — DOCETAXEL 20 MG/ML
150 INJECTION, SOLUTION, CONCENTRATE INTRAVENOUS ONCE
Qty: 0 | Refills: 0 | Status: COMPLETED | OUTPATIENT
Start: 2018-05-22 | End: 2018-05-22

## 2018-05-22 RX ORDER — ENOXAPARIN SODIUM 100 MG/ML
40 INJECTION SUBCUTANEOUS DAILY
Qty: 0 | Refills: 0 | Status: DISCONTINUED | OUTPATIENT
Start: 2018-05-22 | End: 2018-05-28

## 2018-05-22 RX ADMIN — CYCLOBENZAPRINE HYDROCHLORIDE 10 MILLIGRAM(S): 10 TABLET, FILM COATED ORAL at 22:13

## 2018-05-22 RX ADMIN — CYCLOBENZAPRINE HYDROCHLORIDE 10 MILLIGRAM(S): 10 TABLET, FILM COATED ORAL at 00:05

## 2018-05-22 RX ADMIN — Medication 650 MILLIGRAM(S): at 03:38

## 2018-05-22 RX ADMIN — Medication 40 MILLIEQUIVALENT(S): at 14:07

## 2018-05-22 RX ADMIN — Medication 220 MILLIGRAM(S): at 16:32

## 2018-05-22 RX ADMIN — Medication 650 MILLIGRAM(S): at 14:37

## 2018-05-22 RX ADMIN — Medication 40 MILLIEQUIVALENT(S): at 11:20

## 2018-05-22 RX ADMIN — LOSARTAN POTASSIUM 50 MILLIGRAM(S): 100 TABLET, FILM COATED ORAL at 06:41

## 2018-05-22 RX ADMIN — ENOXAPARIN SODIUM 40 MILLIGRAM(S): 100 INJECTION SUBCUTANEOUS at 11:21

## 2018-05-22 RX ADMIN — FOSAPREPITANT DIMEGLUMINE 310 MILLIGRAM(S): 150 INJECTION, POWDER, LYOPHILIZED, FOR SOLUTION INTRAVENOUS at 16:59

## 2018-05-22 RX ADMIN — CISPLATIN 47.92 MILLIGRAM(S): 1 INJECTION, SOLUTION INTRAVENOUS at 19:38

## 2018-05-22 RX ADMIN — Medication 100 MILLIGRAM(S): at 06:43

## 2018-05-22 RX ADMIN — Medication 650 MILLIGRAM(S): at 02:45

## 2018-05-22 RX ADMIN — Medication 12.5 MILLIGRAM(S): at 06:41

## 2018-05-22 RX ADMIN — FLUOROURACIL 43.4 MILLIGRAM(S): 50 INJECTION, SOLUTION INTRAVENOUS at 19:38

## 2018-05-22 RX ADMIN — ONDANSETRON 232 MILLIGRAM(S): 8 TABLET, FILM COATED ORAL at 17:36

## 2018-05-22 RX ADMIN — Medication 650 MILLIGRAM(S): at 14:07

## 2018-05-22 RX ADMIN — DOCETAXEL 257.5 MILLIGRAM(S): 20 INJECTION, SOLUTION, CONCENTRATE INTRAVENOUS at 18:15

## 2018-05-22 RX ADMIN — SODIUM CHLORIDE 500 MILLILITER(S): 9 INJECTION INTRAMUSCULAR; INTRAVENOUS; SUBCUTANEOUS at 12:59

## 2018-05-22 NOTE — PROGRESS NOTE ADULT - PROBLEM SELECTOR PLAN 1
- PICC line placed in AM for induction of chemotherapy (Docetaxel: 75 mg/m2, Cisplatin: 75 mg/m2 and 5-FU: 1000 mg/m2)   - f/u MRI orbit performed last night  - Decadron taper finished  - Lovenox for DVT prophylaxis   - Flexeril 10mg daily for a total of 7 days for muscle spams   - Morphine 2mg PRN for severe pain and oxycodone 5mg q4hrs PRN - could consider switching to percocet if needed

## 2018-05-22 NOTE — DIETITIAN INITIAL EVALUATION ADULT. - ENERGY NEEDS
Height: 66" Weight: 220lbs, IBW 130lbs+/-10%, %%, BMI - 35.6  IBW used to calculate energy needs due to pt's current body weight exceeding 120% of IBW   Nutrient needs based on [facility] standards of care for repletion in adults 2/2 increased needs 2/2 cancer

## 2018-05-22 NOTE — PROGRESS NOTE ADULT - ASSESSMENT
48F with pmhx of OA, HTN, anxiety and T4N1 SCC of right mandible s/p segmental mandibulectomy, right neck dissection, and fibula MVFF reconstruction (7/2017), RT (12/5/17) who presented with headaches, tongue pain, double vision and dysphagia now with concern for SCC involvement of clivus and the left sphenoid sinus, initially admitted to ENT for endoscopic biopsy and PET-CT scan confirming metastatic involvement of SCC at base of skull and C5 vertebral body with transfer to [facility] for induction of chemotherapy.

## 2018-05-22 NOTE — CHART NOTE - NSCHARTNOTEFT_GEN_A_CORE
Orders for chemotherapy written and signed by Dr. Yi. Copy of chemotherapy order form provided to RN and chemotherapy order form sent to chemo pharmacy.  Consent for chemotherapy obtained and placed in chart.    She is agreeable to chemotherapy with induction DCF after PICC line is placed and is aware of adverse effects of chemo regimen.     Case d/w Dr. Kassidy Rodas  PGY-4

## 2018-05-22 NOTE — PROGRESS NOTE ADULT - SUBJECTIVE AND OBJECTIVE BOX
SUBJECTIVE:   Patient seen and examined at bedside. LEXIE overnight. Patient notes some dyphagia w/ tongue swelling which she is able to control with frequent sips of water and by sitting up. She also notes improvement of dysphonia with recent course of steroids. Headaches are currently well controlled on pain medication. PICC to be placed today. No fevers or chills, no weight loss, no fatigue.     OBJECTIVE:  VITAL SIGNS:  T(C): 36.7 (05-22-18 @ 11:26), Max: 36.9 (05-21-18 @ 17:12)  HR: 90 (05-22-18 @ 11:26) (72 - 96)  BP: 144/92 (05-22-18 @ 11:26) (129/83 - 150/94)  RR: 17 (05-22-18 @ 11:26) (16 - 19)  SpO2: 98% (05-22-18 @ 11:26) (96% - 100%)    05-21-18 @ 07:01  -  05-22-18 @ 07:00  --------------------------------------------------------  IN: 940 mL / OUT: 500 mL / NET: 440 mL      CAPILLARY BLOOD GLUCOSE    PHYSICAL EXAM   General: NAD, comfortable, AOx3  HEENT: NCAT, PERRL, clear conjunctiva, no scleral icterus  Neck: supple, no JVD. some fullness/rigidity of the neck with scarring midline at the trachea as well as the right mandible  Respiratory: CTA b/l, no wheezing, rhonchi, rales  Cardiovascular: RRR, normal S1S2, no M/R/G  Abdomen: soft, NT/ND, bowel sounds normoactive throughout, no palpable masses  Extremities: WWP, no clubbing, cyanosis, or edema, longitudinal scar/keloid at the left calf  Neuro -  - Mental Status:  Alert, awake, oriented to person, place, and time; Speech is fluent with intact naming, repetition, and comprehension  - Cranial Nerves II-XII intact  - Motor:  Strength is 5/5 throughout.  There is no pronator drift.  Normal muscle bulk and tone throughout.  - Reflexes:  2+ and symmetric at the biceps, triceps, brachioradialis, knees, and ankles.  Plantar responses flexor.  - Sensory:  Intact to light touch and pin prick  - Coordination:  Finger-nose-finger and heel-knee-shin intact without dysmetria.  Rapid alternating hand movements intact.  - Gait:   Normal steps, base, arm swing, and turning.  Heel and toe walking are normal.  Tandem gait is normal.  Romberg testing is negative.    LABS                        12.5   10.8  )-----------( 202      ( 22 May 2018 08:04 )             39.2     05-22    139  |  98  |  16  ----------------------------<  105<H>  3.3<L>   |  29  |  0.67    Ca    9.3      22 May 2018 08:04  Mg     2.0     05-22      PT/INR - ( 22 May 2018 08:04 )   PT: 14.2 sec;   INR: 1.27          PTT - ( 22 May 2018 08:04 )  PTT:32.0 sec

## 2018-05-22 NOTE — PROGRESS NOTE ADULT - ATTENDING COMMENTS
DX  HEAD AND NECK SCC- for INduction chemotx. f/u MRI orbit.   DYSPHAGIA DUE TO HEAD+NECK SCC- sp/sw eval pending.   HTN - hctz/cozaar DX  HEAD AND NECK SCC- for INduction chemotx. f/u MRI orbit.   DYSPHAGIA DUE TO HEAD+NECK SCC- sp/sw eval pending.   HYPONATREMIA - replete prn. if ongoing issue, will need to dc HCTZ  HTN - hctz/cozaar

## 2018-05-22 NOTE — SWALLOW BEDSIDE ASSESSMENT ADULT - NS SPL SWALLOW CLINIC TRIAL FT
Pharyngeal stage is significant for overall weak swallow with multiple swallows per bolus, which can indicate pharyngeal bolus clearance deficits. Given Pt report of new onset of difficulty swallowing hard solids, an instrumental assessment is warranted to further assess swallow function. Pt would benefit from a modified diet.

## 2018-05-22 NOTE — PROGRESS NOTE ADULT - SUBJECTIVE AND OBJECTIVE BOX
Resting comfortably this am. MRI done last leandra. For PICC line placement today.    CHEMOTHERAPY REGIMEN:        Day:                          Diet:  Protocol:  docetaxel, cisplatin, infusional 5-FU                                  IVF:      MEDICATIONS  (STANDING):  cyclobenzaprine 10 milliGRAM(s) Oral daily  docusate sodium 100 milliGRAM(s) Oral two times a day  heparin  Injectable 7500 Unit(s) SubCutaneous every 8 hours  hydrochlorothiazide 12.5 milliGRAM(s) Oral daily  losartan 50 milliGRAM(s) Oral daily    MEDICATIONS  (PRN):  acetaminophen    Suspension. 650 milliGRAM(s) Oral every 6 hours PRN Mild Pain (1 - 3)  ALPRAZolam 0.5 milliGRAM(s) Oral every 8 hours PRN patient request. Anxiety.  enalaprilat Injectable 1.25 milliGRAM(s) IV Push every 6 hours PRN SBP>160. Hold if HR<60.  labetalol 100 milliGRAM(s) Oral every 8 hours PRN SBP>160. Hold if HR<60  morphine  - Injectable 2 milliGRAM(s) IV Push every 4 hours PRN Severe Pain (7 - 10)  ondansetron Injectable 4 milliGRAM(s) IV Push every 4 hours PRN Nausea and/or Vomiting  oxyCODONE    Solution 5 milliGRAM(s) Oral every 4 hours PRN Moderate Pain (4 - 6)      Allergies    aspirin (Hives)    Intolerances        DVT Prophylaxis: [x ] YES [ ] NO      Antibiotics: [ ] YES [x ] NO    Pain Scale (1-10):       Location:    Vital Signs Last 24 Hrs  T(C): 36.6 (22 May 2018 06:11), Max: 36.9 (21 May 2018 17:12)  T(F): 97.9 (22 May 2018 06:11), Max: 98.5 (21 May 2018 17:12)  HR: 96 (22 May 2018 06:11) (72 - 100)  BP: 129/83 (22 May 2018 06:11) (129/83 - 154/74)  BP(mean): 113 (21 May 2018 20:10) (104 - 113)  RR: 18 (22 May 2018 06:11) (16 - 19)  SpO2: 98% (22 May 2018 06:11) (96% - 100%)    Drug Dosing Weight  Height (cm): 167.64 (17 May 2018 13:12)  Weight (kg): 100 (17 May 2018 13:12)  BMI (kg/m2): 35.6 (17 May 2018 13:12)  BSA (m2): 2.08 (17 May 2018 13:12)    PHYSICAL EXAM:      Constitutional: no complaints.   Eyes: conjunctiva pink.  ENMT: buccal mucosa dry.  Neck: pain on palp over the c-spine.  Back: no other SPT.  Respiratory: chest clear.  Cardiovascular: S1>S2 at apex. ST.  Gastrointestinal: soft, nontender, active bowel sounds. No palp masses.  Genitourinary: voiding without difficulty.   Extremities: no leg edema.  Vascular: radial pulses equal bilaterally.  Neurological: no gross focal deficits but for double vision on left lateral gaze.  Skin: warm and dry.  Lymph Nodes: none palp.  Musculoskeletal: full ROM.  Psychiatric: affect normal.        URINARY CATHETER: [ ] YES [x ] NO     LABS:                CULTURES:    RADIOLOGY & ADDITIONAL STUDIES:

## 2018-05-22 NOTE — DIETITIAN INITIAL EVALUATION ADULT. - OTHER INFO
SCCA of right mandible - s/p segmental mandibulectomy with fibula reconstruction, right neck dissection and post op RT completed in December 2017. Recent CT with concern regarding new primary vs. mets. Chemo to be initiated today. Pt tolerating diet with good PO intake. Denies any significant appetite or wt changes.  Reports some slight chewing swallowing difficulty; SLP evaluated today and recommends [facility] soft diet. Discussed nutrition therapy during chemo and post chemotherapy. Pt denies any n/v/d/c. No pain at this time.

## 2018-05-22 NOTE — DIETITIAN INITIAL EVALUATION ADULT. - NS AS NUTRI INTERV ED CONTENT
Purpose of the nutrition education/Educated on meeting beulah/pro needs, discussed nutrition during and after chemo.

## 2018-05-22 NOTE — PROGRESS NOTE ADULT - ASSESSMENT
MRI of orbit face neck done last leandra. For PICC line placement today. Hopefully induction chemotherapy will begin today.

## 2018-05-22 NOTE — PROGRESS NOTE ADULT - PROBLEM SELECTOR PLAN 1
Tumor started in the right mandible. It now involves the clivus and the left sphenoid sinus. To begin induction chemotherapy today.

## 2018-05-23 LAB
ALBUMIN SERPL ELPH-MCNC: 3.6 G/DL — SIGNIFICANT CHANGE UP (ref 3.3–5)
ALP SERPL-CCNC: 53 U/L — SIGNIFICANT CHANGE UP (ref 40–120)
ALT FLD-CCNC: 17 U/L — SIGNIFICANT CHANGE UP (ref 10–45)
ANION GAP SERPL CALC-SCNC: 12 MMOL/L — SIGNIFICANT CHANGE UP (ref 5–17)
AST SERPL-CCNC: 11 U/L — SIGNIFICANT CHANGE UP (ref 10–40)
BILIRUB SERPL-MCNC: 0.4 MG/DL — SIGNIFICANT CHANGE UP (ref 0.2–1.2)
BUN SERPL-MCNC: 16 MG/DL — SIGNIFICANT CHANGE UP (ref 7–23)
CALCIUM SERPL-MCNC: 8.6 MG/DL — SIGNIFICANT CHANGE UP (ref 8.4–10.5)
CHLORIDE SERPL-SCNC: 100 MMOL/L — SIGNIFICANT CHANGE UP (ref 96–108)
CO2 SERPL-SCNC: 23 MMOL/L — SIGNIFICANT CHANGE UP (ref 22–31)
CREAT SERPL-MCNC: 0.59 MG/DL — SIGNIFICANT CHANGE UP (ref 0.5–1.3)
GLUCOSE SERPL-MCNC: 122 MG/DL — HIGH (ref 70–99)
HCT VFR BLD CALC: 37.2 % — SIGNIFICANT CHANGE UP (ref 34.5–45)
HGB BLD-MCNC: 11.6 G/DL — SIGNIFICANT CHANGE UP (ref 11.5–15.5)
LDH SERPL L TO P-CCNC: 142 U/L — SIGNIFICANT CHANGE UP (ref 50–242)
MAGNESIUM SERPL-MCNC: 2 MG/DL — SIGNIFICANT CHANGE UP (ref 1.6–2.6)
MCHC RBC-ENTMCNC: 24.4 PG — LOW (ref 27–34)
MCHC RBC-ENTMCNC: 31.2 G/DL — LOW (ref 32–36)
MCV RBC AUTO: 78.2 FL — LOW (ref 80–100)
PHOSPHATE SERPL-MCNC: 2.9 MG/DL — SIGNIFICANT CHANGE UP (ref 2.5–4.5)
PLATELET # BLD AUTO: 203 K/UL — SIGNIFICANT CHANGE UP (ref 150–400)
POTASSIUM SERPL-MCNC: 4.2 MMOL/L — SIGNIFICANT CHANGE UP (ref 3.5–5.3)
POTASSIUM SERPL-SCNC: 4.2 MMOL/L — SIGNIFICANT CHANGE UP (ref 3.5–5.3)
PROT SERPL-MCNC: 6.4 G/DL — SIGNIFICANT CHANGE UP (ref 6–8.3)
RBC # BLD: 4.76 M/UL — SIGNIFICANT CHANGE UP (ref 3.8–5.2)
RBC # FLD: 17 % — HIGH (ref 10.3–16.9)
SODIUM SERPL-SCNC: 135 MMOL/L — SIGNIFICANT CHANGE UP (ref 135–145)
URATE SERPL-MCNC: 3.5 MG/DL — SIGNIFICANT CHANGE UP (ref 2.5–7)
WBC # BLD: 7 K/UL — SIGNIFICANT CHANGE UP (ref 3.8–10.5)
WBC # FLD AUTO: 7 K/UL — SIGNIFICANT CHANGE UP (ref 3.8–10.5)

## 2018-05-23 PROCEDURE — 99233 SBSQ HOSP IP/OBS HIGH 50: CPT | Mod: GC

## 2018-05-23 RX ORDER — PANTOPRAZOLE SODIUM 20 MG/1
40 TABLET, DELAYED RELEASE ORAL
Qty: 0 | Refills: 0 | Status: DISCONTINUED | OUTPATIENT
Start: 2018-05-23 | End: 2018-05-28

## 2018-05-23 RX ADMIN — PANTOPRAZOLE SODIUM 40 MILLIGRAM(S): 20 TABLET, DELAYED RELEASE ORAL at 17:33

## 2018-05-23 RX ADMIN — Medication 100 MILLIGRAM(S): at 17:33

## 2018-05-23 RX ADMIN — Medication 220 MILLIGRAM(S): at 06:28

## 2018-05-23 RX ADMIN — Medication 100 MILLIGRAM(S): at 06:29

## 2018-05-23 RX ADMIN — ENOXAPARIN SODIUM 40 MILLIGRAM(S): 100 INJECTION SUBCUTANEOUS at 12:00

## 2018-05-23 RX ADMIN — Medication 12.5 MILLIGRAM(S): at 06:29

## 2018-05-23 RX ADMIN — Medication 220 MILLIGRAM(S): at 22:16

## 2018-05-23 RX ADMIN — CYCLOBENZAPRINE HYDROCHLORIDE 10 MILLIGRAM(S): 10 TABLET, FILM COATED ORAL at 21:55

## 2018-05-23 RX ADMIN — LOSARTAN POTASSIUM 50 MILLIGRAM(S): 100 TABLET, FILM COATED ORAL at 06:29

## 2018-05-23 RX ADMIN — FLUOROURACIL 43.4 MILLIGRAM(S): 50 INJECTION, SOLUTION INTRAVENOUS at 22:42

## 2018-05-23 NOTE — PROGRESS NOTE ADULT - ASSESSMENT
The patient is feeling okay this am. Induction chemotherapy is in progress. MRI findings noted. 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 Hx of T4N1 SCC of right mandible s/p segmental mandibulectomy, right neck dissection, and fibula MVFF reconstruction 7/2017. RT completed 12/5/17. MRI face/orbits shows worsening disease from previous MRI in Feb 2018.     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 C1D2 of induction DCF, no nausea/vomiting or loss of appetite  -Tolerating chemotherapy well so far, Pt to complete 1st cycle likely on 5/25  -Neulasta to be given 24 hours after completion of chemotherapy   -Encourage ambulation and oral hydration  -CTM BMP, Mg and Phos daily; replete potassium daily    Case d/w Dr. Yi

## 2018-05-23 NOTE — PROGRESS NOTE ADULT - PROBLEM SELECTOR PLAN 1
- Chemotherapy started yesterday (Docetaxel: 75 mg/m2, Cisplatin: 75 mg/m2 and 5-FU: 1000 mg/m2)   - Lovenox for DVT prophylaxis   - Flexeril 10mg daily for a total of 7 days for muscle spams   - Morphine 2mg PRN for severe pain and oxycodone 5mg q4hrs PRN - could consider switching to percocet if needed  - HCTZ discontinued in the setting of chemotherapy/use of nephrotoxic agents  - Tumor lysis labs WNL

## 2018-05-23 NOTE — PROGRESS NOTE ADULT - PROBLEM SELECTOR PLAN 1
Patient is s/p resection of right maxillary SCC last year followed by radiation therapy. She now has recurrent cancer involving the clivus and sphenoid sinus area. Will continue induction chemotherapy. 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 She now has recurrent cancer involving the clivus and sphenoid sinus area. Will continue induction chemotherapy.

## 2018-05-23 NOTE — PROGRESS NOTE ADULT - ATTENDING COMMENTS
DX  HEAD AND NECK SCC- for Induction chemotx. MRI orbits/face -reveals increasing size of a large central skull base mass,encases both   internal carotid arteries &  involves the right jugular foramen.     DYSPHAGIA DUE TO HEAD+NECK SCC- tolerating POs.     HYPONATREMIA - replete prn. if ongoing issue, will need to dc HCTZ    HTN - hctz/cozaar .

## 2018-05-23 NOTE — PROGRESS NOTE ADULT - SUBJECTIVE AND OBJECTIVE BOX
SUBJECTIVE:  Patient seen and examined. LEXIE overnight. No nausea or vomiting. No changes in bowel movements. No fever or chills. No changes in appetite. No dysuria or increased urinary frequency.     OBJECTIVE:  VITAL SIGNS:  T(C): 36.7 (05-23-18 @ 05:16), Max: 37.1 (05-22-18 @ 19:45)  HR: 86 (05-23-18 @ 05:16) (80 - 93)  BP: 147/92 (05-23-18 @ 05:16) (129/86 - 149/89)  RR: 15 (05-23-18 @ 05:16) (14 - 17)  SpO2: 96% (05-23-18 @ 05:16) (95% - 97%)    05-22-18 @ 07:01  -  05-23-18 @ 07:00  --------------------------------------------------------  IN: 2838.4 mL / OUT: 0 mL / NET: 2838.4 mL      CAPILLARY BLOOD GLUCOSE      PHYSICAL EXAM   General: NAD, comfortable, AOx3  HEENT: NCAT, PERRL, clear conjunctiva, no scleral icterus  Neck: supple, no JVD, some rigidity and fullness at the right mandible  Respiratory: CTA b/l, no wheezing, rhonchi, rales  Cardiovascular: RRR, normal S1S2, no M/R/G  Abdomen: soft, NT/ND, bowel sounds normoactive throughout, no palpable masses  Extremities: WWP, no clubbing, cyanosis, or edema    LABS                        11.6   7.0   )-----------( 203      ( 23 May 2018 06:43 )             37.2     05-23    135  |  100  |  16  ----------------------------<  122<H>  4.2   |  23  |  0.59    Ca    8.6      23 May 2018 06:43  Phos  2.9     05-23  Mg     2.0     05-23    TPro  6.4  /  Alb  3.6  /  TBili  0.4  /  DBili  x   /  AST  11  /  ALT  17  /  AlkPhos  53  05-23    PT/INR - ( 22 May 2018 08:04 )   PT: 14.2 sec;   INR: 1.27          PTT - ( 22 May 2018 08:04 )  PTT:32.0 sec SUBJECTIVE:  Patient seen and examined. LEXIE overnight. Headaches are well controlled on pain medication. Dysphagia is managed by sitting up and eating slowly; patient would like to wait until the chemotherapy is done to undergo barium swallow. Dysphonia is largely improved with course of corticosteroids. No nausea or vomiting. No changes in bowel movements. No fever or chills. No changes in appetite. No dysuria or increased urinary frequency.     OBJECTIVE:  VITAL SIGNS:  T(C): 36.7 (05-23-18 @ 05:16), Max: 37.1 (05-22-18 @ 19:45)  HR: 86 (05-23-18 @ 05:16) (80 - 93)  BP: 147/92 (05-23-18 @ 05:16) (129/86 - 149/89)  RR: 15 (05-23-18 @ 05:16) (14 - 17)  SpO2: 96% (05-23-18 @ 05:16) (95% - 97%)    05-22-18 @ 07:01  -  05-23-18 @ 07:00  --------------------------------------------------------  IN: 2838.4 mL / OUT: 0 mL / NET: 2838.4 mL      CAPILLARY BLOOD GLUCOSE      PHYSICAL EXAM   General: NAD, comfortable, AOx3  HEENT: NCAT, PERRL, clear conjunctiva, no scleral icterus  Neck: supple, no JVD, some rigidity and fullness at the right mandible  Respiratory: CTA b/l, no wheezing, rhonchi, rales  Cardiovascular: RRR, normal S1S2, no M/R/G  Abdomen: soft, NT/ND, bowel sounds normoactive throughout, no palpable masses  Extremities: WWP, no clubbing, cyanosis, or edema    LABS                        11.6   7.0   )-----------( 203      ( 23 May 2018 06:43 )             37.2     05-23    135  |  100  |  16  ----------------------------<  122<H>  4.2   |  23  |  0.59    Ca    8.6      23 May 2018 06:43  Phos  2.9     05-23  Mg     2.0     05-23    TPro  6.4  /  Alb  3.6  /  TBili  0.4  /  DBili  x   /  AST  11  /  ALT  17  /  AlkPhos  53  05-23    PT/INR - ( 22 May 2018 08:04 )   PT: 14.2 sec;   INR: 1.27          PTT - ( 22 May 2018 08:04 )  PTT:32.0 sec

## 2018-05-23 NOTE — PROGRESS NOTE ADULT - PROBLEM SELECTOR PLAN 1
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 She now has recurrent cancer involving the clivus and sphenoid sinus area. Will continue induction chemotherapy.

## 2018-05-23 NOTE — PROGRESS NOTE ADULT - SUBJECTIVE AND OBJECTIVE BOX
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 650 milliGRAM(s) Oral every 6 hours PRN Mild Pain (1 - 3)  ALPRAZolam 0.5 milliGRAM(s) Oral every 8 hours PRN patient request. Anxiety.  morphine  - Injectable 2 milliGRAM(s) IV Push every 4 hours PRN Severe Pain (7 - 10)  ondansetron Injectable 4 milliGRAM(s) IV Push every 4 hours PRN Nausea and/or Vomiting  oxyCODONE    Solution 5 milliGRAM(s) Oral every 4 hours PRN Moderate Pain (4 - 6)      Allergies    aspirin (Hives)    Intolerances        DVT Prophylaxis: [x ] YES [ ] NO      Antibiotics: [ ] YES [x ] NO    Pain Scale (1-10):       Location:    Vital Signs Last 24 Hrs  T(C): 36.7 (23 May 2018 05:16), Max: 37.1 (22 May 2018 19:45)  T(F): 98 (23 May 2018 05:16), Max: 98.7 (22 May 2018 19:45)  HR: 86 (23 May 2018 05:16) (80 - 93)  BP: 147/92 (23 May 2018 05:16) (129/86 - 149/89)  BP(mean): --  RR: 15 (23 May 2018 05:16) (14 - 17)  SpO2: 96% (23 May 2018 05:16) (95% - 98%)    Drug Dosing Weight  Height (cm): 167.64 (17 May 2018 13:12)  Weight (kg): 100 (17 May 2018 13:12)  BMI (kg/m2): 35.6 (17 May 2018 13:12)  BSA (m2): 2.08 (17 May 2018 13:12)    PHYSICAL EXAM:      Constitutional: no complaints this am.  Eyes: conjunctiva pink.  ENMT: buccal mucosa without lesions.  Neck: no masses.  Back: no SPT.  Respiratory: chest clear.  Cardiovascular: S1>S2 at apex. RSR.  Gastrointestinal: soft, nontender, active bowel sounds. No palp masses.  Genitourinary: voiding without difficulty.  Extremities: no leg edema.  Neurological: no gross focal deficits.  Skin: warm and dry.  Lymph Nodes: none palp.  Musculoskeletal: full ROM.  Psychiatric: affect normal.        URINARY CATHETER: [ ] YES [x ] NO     LABS:  CBC Full  -  ( 22 May 2018 08:04 )  WBC Count : 10.8 K/uL  Hemoglobin : 12.5 g/dL  Hematocrit : 39.2 %  Platelet Count - Automated : 202 K/uL  Mean Cell Volume : 76.6 fL  Mean Cell Hemoglobin : 24.4 pg  Mean Cell Hemoglobin Concentration : 31.9 g/dL  Auto Neutrophil # : x  Auto Lymphocyte # : x  Auto Monocyte # : x  Auto Eosinophil # : x  Auto Basophil # : x  Auto Neutrophil % : x  Auto Lymphocyte % : x  Auto Monocyte % : x  Auto Eosinophil % : x  Auto Basophil % : x    05-22    139  |  98  |  16  ----------------------------<  105<H>  3.3<L>   |  29  |  0.67    Ca    9.3      22 May 2018 08:04  Mg     2.0     05-22      PT/INR - ( 22 May 2018 08:04 )   PT: 14.2 sec;   INR: 1.27          PTT - ( 22 May 2018 08:04 )  PTT:32.0 sec      CULTURES:    RADIOLOGY & ADDITIONAL STUDIES: < from: MR Orbit, Face, and/or Neck w/wo IV Cont, Bilateral (05.21.18 @ 23:29) >  Impression:  Since the prior study dated 2/26/2018, there has been interval increase   in size of a large central skull base mass which now encases both   internal carotid arteries at the petrous segments, involves the right   jugular foramen, bilateral Meckel's caves, approximates the bilateral   cavernous sinuses, and extends into the rightinternal auditory canal.   There is also further involvement of the bilateral petrous apices, right   occipital condyle and the dens. There is now involvement of the bilateral   longus capitus muscles.    Stable postoperative appearance of the right segmental mandibulectomy   site.                  "Thank you for the opportunity to participate in the care of this   patient."    SHIRLENE FARRIS M.D., RADIOLOGY RESIDENT  This document has been electronically signed.  BISI MCGINNIS M.D., ATTENDING RADIOLOGIST  This document has been electronically signed. 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 She is tolerating chemotherapy well thus far. No nausea/vomiting. Appetite is good. She notes slight metallic taste.     MEDICATIONS  (STANDING):  cyclobenzaprine 10 milliGRAM(s) Oral daily  dexamethasone  IVPB 20 milliGRAM(s) IV Intermittent daily  docusate sodium 100 milliGRAM(s) Oral two times a day  enoxaparin Injectable 40 milliGRAM(s) SubCutaneous daily  fluorouracil IVPB (eMAR) 2080 milliGRAM(s) IV Intermittent daily  losartan 50 milliGRAM(s) Oral daily    MEDICATIONS  (PRN):  acetaminophen    Suspension. 650 milliGRAM(s) Oral every 6 hours PRN Mild Pain (1 - 3)  ALPRAZolam 0.5 milliGRAM(s) Oral every 8 hours PRN patient request. Anxiety.  morphine  - Injectable 2 milliGRAM(s) IV Push every 4 hours PRN Severe Pain (7 - 10)  ondansetron Injectable 4 milliGRAM(s) IV Push every 4 hours PRN Nausea and/or Vomiting  oxyCODONE    Solution 5 milliGRAM(s) Oral every 4 hours PRN Moderate Pain (4 - 6)    Allergies    aspirin (Hives)    Intolerances      T(C): 36.7 (05-23-18 @ 05:16), Max: 37.1 (05-22-18 @ 19:45)  T(F): 98 (05-23-18 @ 05:16), Max: 98.7 (05-22-18 @ 19:45)  HR: 86 (05-23-18 @ 05:16) (80 - 93)  BP: 147/92 (05-23-18 @ 05:16) (129/86 - 149/89)  ABP: --  ABP(mean): --  RR: 15 (05-23-18 @ 05:16) (14 - 17)  SpO2: 96% (05-23-18 @ 05:16) (95% - 98%)      PHYSICAL EXAM:      Constitutional: In good spirits, AAOx3  HEENT: NCAT, no conjunctival pallor, no scleral icterus, no masses noted, tan line noted demarcating previous radiation field   Neck: supple, no jvd  Cardio: RRR, Normal S1S2  Pulm: CTABL, no wheezing   Abd: Soft, nt/nd, +BS, no hepatomegaly  Ext: No peripheral edema, distal pulses intact  Musculoskeletal: Normal ROM  Neuro: CN II-XII intact       LABS:                           11.6   7.0   )-----------( 203      ( 23 May 2018 06:43 )             37.2         CBC Full  -  ( 23 May 2018 06:43 )  WBC Count : 7.0 K/uL  Hemoglobin : 11.6 g/dL  Hematocrit : 37.2 %  Platelet Count - Automated : 203 K/uL  Mean Cell Volume : 78.2 fL  Mean Cell Hemoglobin : 24.4 pg  Mean Cell Hemoglobin Concentration : 31.2 g/dL  Auto Neutrophil # : x  Auto Lymphocyte # : x  Auto Monocyte # : x  Auto Eosinophil # : x  Auto Basophil # : x  Auto Neutrophil % : x  Auto Lymphocyte % : x  Auto Monocyte % : x  Auto Eosinophil % : x  Auto Basophil % : x        05-23    135  |  100  |  16  ----------------------------<  122<H>  4.2   |  23  |  0.59    Ca    8.6      23 May 2018 06:43  Phos  2.9     05-23  Mg     2.0     05-23    TPro  6.4  /  Alb  3.6  /  TBili  0.4  /  DBili  x   /  AST  11  /  ALT  17  /  AlkPhos  53  05-23        PT/INR - ( 22 May 2018 08:04 )   PT: 14.2 sec;   INR: 1.27          PTT - ( 22 May 2018 08:04 )  PTT:32.0 sec    CULTURES:    RADIOLOGY & ADDITIONAL STUDIES: < from: MR Orbit, Face, and/or Neck w/wo IV Cont, Bilateral (05.21.18 @ 23:29) >  Impression:  Since the prior study dated 2/26/2018, there has been interval increase   in size of a large central skull base mass which now encases both   internal carotid arteries at the petrous segments, involves the right   jugular foramen, bilateral Meckel's caves, approximates the bilateral   cavernous sinuses, and extends into the rightinternal auditory canal.   There is also further involvement of the bilateral petrous apices, right   occipital condyle and the dens. There is now involvement of the bilateral   longus capitus muscles.    Stable postoperative appearance of the right segmental mandibulectomy   site.                  "Thank you for the opportunity to participate in the care of this   patient."    SHIRLENE FARRIS M.D., RADIOLOGY RESIDENT  This document has been electronically signed.  BISI MCGINNIS M.D., ATTENDING RADIOLOGIST  This document has been electronically signed. May 22 2018  4:02PM        < end of copied text >

## 2018-05-23 NOTE — PROGRESS NOTE ADULT - ASSESSMENT
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

## 2018-05-23 NOTE — PROGRESS NOTE ADULT - ATTENDING COMMENTS
Induction chemotherapy now in progress. Monitor K and Mg and KFTs. Supplement as required. The patient is encouraged to be up and OOB as much as possible.

## 2018-05-23 NOTE — PROGRESS NOTE ADULT - SUBJECTIVE AND OBJECTIVE BOX
Chemotherapy was begun last eve. The patient is tolerating it well thus far. She offers no complaints this am.    CHEMOTHERAPY REGIMEN:        Day:         2                 Diet:  Protocol:  DCF                                  IVF:      MEDICATIONS  (STANDING):  cyclobenzaprine 10 milliGRAM(s) Oral daily  dexamethasone  IVPB 20 milliGRAM(s) IV Intermittent daily  docusate sodium 100 milliGRAM(s) Oral two times a day  enoxaparin Injectable 40 milliGRAM(s) SubCutaneous daily  fluorouracil IVPB (eMAR) 2080 milliGRAM(s) IV Intermittent daily  hydrochlorothiazide 12.5 milliGRAM(s) Oral daily  losartan 50 milliGRAM(s) Oral daily    MEDICATIONS  (PRN):  acetaminophen    Suspension. 650 milliGRAM(s) Oral every 6 hours PRN Mild Pain (1 - 3)  ALPRAZolam 0.5 milliGRAM(s) Oral every 8 hours PRN patient request. Anxiety.  morphine  - Injectable 2 milliGRAM(s) IV Push every 4 hours PRN Severe Pain (7 - 10)  ondansetron Injectable 4 milliGRAM(s) IV Push every 4 hours PRN Nausea and/or Vomiting  oxyCODONE    Solution 5 milliGRAM(s) Oral every 4 hours PRN Moderate Pain (4 - 6)      Allergies    aspirin (Hives)    Intolerances        DVT Prophylaxis: [x ] YES [ ] NO      Antibiotics: [ ] YES [x ] NO    Pain Scale (1-10):       Location:    Vital Signs Last 24 Hrs  T(C): 36.7 (23 May 2018 05:16), Max: 37.1 (22 May 2018 19:45)  T(F): 98 (23 May 2018 05:16), Max: 98.7 (22 May 2018 19:45)  HR: 86 (23 May 2018 05:16) (80 - 93)  BP: 147/92 (23 May 2018 05:16) (129/86 - 149/89)  BP(mean): --  RR: 15 (23 May 2018 05:16) (14 - 17)  SpO2: 96% (23 May 2018 05:16) (95% - 98%)    Drug Dosing Weight  Height (cm): 167.64 (17 May 2018 13:12)  Weight (kg): 100 (17 May 2018 13:12)  BMI (kg/m2): 35.6 (17 May 2018 13:12)  BSA (m2): 2.08 (17 May 2018 13:12)    PHYSICAL EXAM:      Constitutional: no complaints this am.  Eyes: conjunctiva pink.  ENMT: buccal mucosa without lesions.  Neck: no masses.  Back: no SPT.  Respiratory: chest clear.  Cardiovascular: S1>S2 at apex. RSR.  Gastrointestinal: soft, nontender, active bowel sounds. No palp masses.  Genitourinary: voiding without difficulty.  Extremities: no leg edema.  Neurological: no gross focal deficits.  Skin: warm and dry.  Lymph Nodes: none palp.  Musculoskeletal: full ROM.  Psychiatric: affect normal.        URINARY CATHETER: [ ] YES [x ] NO     LABS:  CBC Full  -  ( 22 May 2018 08:04 )  WBC Count : 10.8 K/uL  Hemoglobin : 12.5 g/dL  Hematocrit : 39.2 %  Platelet Count - Automated : 202 K/uL  Mean Cell Volume : 76.6 fL  Mean Cell Hemoglobin : 24.4 pg  Mean Cell Hemoglobin Concentration : 31.9 g/dL  Auto Neutrophil # : x  Auto Lymphocyte # : x  Auto Monocyte # : x  Auto Eosinophil # : x  Auto Basophil # : x  Auto Neutrophil % : x  Auto Lymphocyte % : x  Auto Monocyte % : x  Auto Eosinophil % : x  Auto Basophil % : x    05-22    139  |  98  |  16  ----------------------------<  105<H>  3.3<L>   |  29  |  0.67    Ca    9.3      22 May 2018 08:04  Mg     2.0     05-22      PT/INR - ( 22 May 2018 08:04 )   PT: 14.2 sec;   INR: 1.27          PTT - ( 22 May 2018 08:04 )  PTT:32.0 sec      CULTURES:    RADIOLOGY & ADDITIONAL STUDIES: < from: MR Orbit, Face, and/or Neck w/wo IV Cont, Bilateral (05.21.18 @ 23:29) >  Impression:  Since the prior study dated 2/26/2018, there has been interval increase   in size of a large central skull base mass which now encases both   internal carotid arteries at the petrous segments, involves the right   jugular foramen, bilateral Meckel's caves, approximates the bilateral   cavernous sinuses, and extends into the rightinternal auditory canal.   There is also further involvement of the bilateral petrous apices, right   occipital condyle and the dens. There is now involvement of the bilateral   longus capitus muscles.    Stable postoperative appearance of the right segmental mandibulectomy   site.                  "Thank you for the opportunity to participate in the care of this   patient."    SHIRLENE FARRIS M.D., RADIOLOGY RESIDENT  This document has been electronically signed.  BISI MCGINNIS M.D., ATTENDING RADIOLOGIST  This document has been electronically signed. May 22 2018  4:02PM        < end of copied text >

## 2018-05-24 ENCOUNTER — TRANSCRIPTION ENCOUNTER (OUTPATIENT)
Age: 48
End: 2018-05-24

## 2018-05-24 LAB
ALBUMIN SERPL ELPH-MCNC: 3.6 G/DL — SIGNIFICANT CHANGE UP (ref 3.3–5)
ALP SERPL-CCNC: 46 U/L — SIGNIFICANT CHANGE UP (ref 40–120)
ALT FLD-CCNC: 22 U/L — SIGNIFICANT CHANGE UP (ref 10–45)
ANION GAP SERPL CALC-SCNC: 12 MMOL/L — SIGNIFICANT CHANGE UP (ref 5–17)
AST SERPL-CCNC: 16 U/L — SIGNIFICANT CHANGE UP (ref 10–40)
BASOPHILS NFR BLD AUTO: 0 % — SIGNIFICANT CHANGE UP (ref 0–2)
BILIRUB SERPL-MCNC: 0.4 MG/DL — SIGNIFICANT CHANGE UP (ref 0.2–1.2)
BUN SERPL-MCNC: 17 MG/DL — SIGNIFICANT CHANGE UP (ref 7–23)
CALCIUM SERPL-MCNC: 8.7 MG/DL — SIGNIFICANT CHANGE UP (ref 8.4–10.5)
CHLORIDE SERPL-SCNC: 97 MMOL/L — SIGNIFICANT CHANGE UP (ref 96–108)
CO2 SERPL-SCNC: 24 MMOL/L — SIGNIFICANT CHANGE UP (ref 22–31)
CREAT SERPL-MCNC: 0.63 MG/DL — SIGNIFICANT CHANGE UP (ref 0.5–1.3)
EOSINOPHIL NFR BLD AUTO: 0 % — SIGNIFICANT CHANGE UP (ref 0–6)
GLUCOSE SERPL-MCNC: 169 MG/DL — HIGH (ref 70–99)
HCT VFR BLD CALC: 37.2 % — SIGNIFICANT CHANGE UP (ref 34.5–45)
HGB BLD-MCNC: 11.9 G/DL — SIGNIFICANT CHANGE UP (ref 11.5–15.5)
LDH SERPL L TO P-CCNC: 155 U/L — SIGNIFICANT CHANGE UP (ref 50–242)
LYMPHOCYTES # BLD AUTO: 4 % — LOW (ref 13–44)
MAGNESIUM SERPL-MCNC: 1.9 MG/DL — SIGNIFICANT CHANGE UP (ref 1.6–2.6)
MCHC RBC-ENTMCNC: 24.7 PG — LOW (ref 27–34)
MCHC RBC-ENTMCNC: 32 G/DL — SIGNIFICANT CHANGE UP (ref 32–36)
MCV RBC AUTO: 77.2 FL — LOW (ref 80–100)
MONOCYTES NFR BLD AUTO: 1 % — LOW (ref 2–14)
NEUTROPHILS NFR BLD AUTO: 95 % — HIGH (ref 43–77)
PHOSPHATE SERPL-MCNC: 2.8 MG/DL — SIGNIFICANT CHANGE UP (ref 2.5–4.5)
PLATELET # BLD AUTO: 188 K/UL — SIGNIFICANT CHANGE UP (ref 150–400)
POTASSIUM SERPL-MCNC: 3.6 MMOL/L — SIGNIFICANT CHANGE UP (ref 3.5–5.3)
POTASSIUM SERPL-SCNC: 3.6 MMOL/L — SIGNIFICANT CHANGE UP (ref 3.5–5.3)
PROT SERPL-MCNC: 6.5 G/DL — SIGNIFICANT CHANGE UP (ref 6–8.3)
RBC # BLD: 4.82 M/UL — SIGNIFICANT CHANGE UP (ref 3.8–5.2)
RBC # FLD: 16.9 % — SIGNIFICANT CHANGE UP (ref 10.3–16.9)
SODIUM SERPL-SCNC: 133 MMOL/L — LOW (ref 135–145)
URATE SERPL-MCNC: 3.9 MG/DL — SIGNIFICANT CHANGE UP (ref 2.5–7)
WBC # BLD: 7.2 K/UL — SIGNIFICANT CHANGE UP (ref 3.8–10.5)
WBC # FLD AUTO: 7.2 K/UL — SIGNIFICANT CHANGE UP (ref 3.8–10.5)

## 2018-05-24 PROCEDURE — 99233 SBSQ HOSP IP/OBS HIGH 50: CPT

## 2018-05-24 RX ORDER — SIMETHICONE 80 MG/1
80 TABLET, CHEWABLE ORAL ONCE
Qty: 0 | Refills: 0 | Status: COMPLETED | OUTPATIENT
Start: 2018-05-24 | End: 2018-05-24

## 2018-05-24 RX ORDER — SODIUM CHLORIDE 9 MG/ML
1000 INJECTION INTRAMUSCULAR; INTRAVENOUS; SUBCUTANEOUS
Qty: 0 | Refills: 0 | Status: DISCONTINUED | OUTPATIENT
Start: 2018-05-24 | End: 2018-05-26

## 2018-05-24 RX ORDER — POLYETHYLENE GLYCOL 3350 17 G/17G
17 POWDER, FOR SOLUTION ORAL ONCE
Qty: 0 | Refills: 0 | Status: COMPLETED | OUTPATIENT
Start: 2018-05-24 | End: 2018-05-24

## 2018-05-24 RX ADMIN — ENOXAPARIN SODIUM 40 MILLIGRAM(S): 100 INJECTION SUBCUTANEOUS at 12:47

## 2018-05-24 RX ADMIN — Medication 100 MILLIGRAM(S): at 17:10

## 2018-05-24 RX ADMIN — FLUOROURACIL 43.4 MILLIGRAM(S): 50 INJECTION, SOLUTION INTRAVENOUS at 22:59

## 2018-05-24 RX ADMIN — Medication 100 MILLIGRAM(S): at 06:55

## 2018-05-24 RX ADMIN — POLYETHYLENE GLYCOL 3350 17 GRAM(S): 17 POWDER, FOR SOLUTION ORAL at 18:48

## 2018-05-24 RX ADMIN — SODIUM CHLORIDE 75 MILLILITER(S): 9 INJECTION INTRAMUSCULAR; INTRAVENOUS; SUBCUTANEOUS at 21:29

## 2018-05-24 RX ADMIN — LOSARTAN POTASSIUM 50 MILLIGRAM(S): 100 TABLET, FILM COATED ORAL at 06:55

## 2018-05-24 RX ADMIN — SIMETHICONE 80 MILLIGRAM(S): 80 TABLET, CHEWABLE ORAL at 01:26

## 2018-05-24 RX ADMIN — PANTOPRAZOLE SODIUM 40 MILLIGRAM(S): 20 TABLET, DELAYED RELEASE ORAL at 06:55

## 2018-05-24 RX ADMIN — SODIUM CHLORIDE 75 MILLILITER(S): 9 INJECTION INTRAMUSCULAR; INTRAVENOUS; SUBCUTANEOUS at 07:47

## 2018-05-24 NOTE — DISCHARGE NOTE ADULT - SECONDARY DIAGNOSIS.
HTN (hypertension) Gastroesophageal reflux disease, esophagitis presence not specified Transition of care performed with sharing of clinical summary

## 2018-05-24 NOTE — DISCHARGE NOTE ADULT - MEDICATION SUMMARY - MEDICATIONS TO TAKE
I will START or STAY ON the medications listed below when I get home from the hospital:    NS 10cc flush weekly x 2 months Z45.2  -- Indication: For PICC line    heparin flush 10u/mL 3mL daily to each lumen x2 months Z45.2  -- Indication: For PICC line    change PICC dressing weekly x2 months Z45.2  -- Indication: For PICC line    oxyCODONE-acetaminophen 5 mg-325 mg oral tablet  -- 1 tab(s) by mouth every 4 to 6 hours, As Needed -for moderate pain - for severe pain MDD:6  -- Caution federal law prohibits the transfer of this drug to any person other  than the person for whom it was prescribed.  May cause drowsiness.  Alcohol may intensify this effect.  Use care when operating dangerous machinery.  This prescription cannot be refilled.  This product contains acetaminophen.  Do not use  with any other product containing acetaminophen to prevent possible liver damage.  Using more of this medication than prescribed may cause serious breathing problems.    -- Indication: For Pain    acetaminophen 160 mg/5 mL oral suspension  -- 640 milliliter(s) by mouth every 6 hours, As Needed -for mild pain  -- Shake well before use.  This product contains acetaminophen.  Do not use  with any other product containing acetaminophen to prevent possible liver damage.    -- Indication: For Pain    Zofran 4 mg oral tablet  -- 1 tab(s) by mouth every 6 hours, As Needed   -- Indication: For Nausea    ALPRAZolam 0.5 mg oral tablet  -- 1 tab(s) by mouth every 8 hours, As needed, patient request. Anxiety.  -- Indication: For Anxiety    famotidine 40 mg/5 mL oral liquid  -- 40 milligram(s) by mouth once a day  -- Expires___________________  It is very important that you take or use this exactly as directed.  Do not skip doses or discontinue unless directed by your doctor.  Obtain medical advice before taking any non-prescription drugs as some may affect the action of this medication.    -- Indication: For GERD    docusate sodium 100 mg oral capsule  -- 1 cap(s) by mouth 2 times a day  -- Indication: For Constipation    polyethylene glycol 3350 oral powder for reconstitution  -- 17 gram(s) by mouth 2 times a day  -- Indication: For Constipation    diphenhydrAMINE/lidocaine/aluminum hydroxide/magnesium hydroxide/simethicone mucous membrane suspension  -- 3 application mucous membrane 3 times a day, As Needed   -- Indication: For Mucositis

## 2018-05-24 NOTE — PROGRESS NOTE ADULT - ASSESSMENT
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 for induction of chemotherapy.

## 2018-05-24 NOTE — DISCHARGE NOTE ADULT - PATIENT PORTAL LINK FT
You can access the [facility] Patient Portal, offered by [facility], by registering with the following website: [email or web address]

## 2018-05-24 NOTE — PROGRESS NOTE ADULT - SUBJECTIVE AND OBJECTIVE BOX
The patient had some acid reflux discomfort yesterday. This has resolved. No other GI upset noted.    CHEMOTHERAPY REGIMEN:        Day:         3                 Diet:  Protocol:     DCF induction                                IVF:      MEDICATIONS  (STANDING):  docusate sodium 100 milliGRAM(s) Oral two times a day  enoxaparin Injectable 40 milliGRAM(s) SubCutaneous daily  fluorouracil IVPB (eMAR) 2080 milliGRAM(s) IV Intermittent daily  losartan 50 milliGRAM(s) Oral daily  pantoprazole    Tablet 40 milliGRAM(s) Oral before breakfast    MEDICATIONS  (PRN):  acetaminophen    Suspension. 650 milliGRAM(s) Oral every 6 hours PRN Mild Pain (1 - 3)  ALPRAZolam 0.5 milliGRAM(s) Oral every 8 hours PRN patient request. Anxiety.  morphine  - Injectable 2 milliGRAM(s) IV Push every 4 hours PRN Severe Pain (7 - 10)  ondansetron Injectable 4 milliGRAM(s) IV Push every 4 hours PRN Nausea and/or Vomiting  oxyCODONE    Solution 5 milliGRAM(s) Oral every 4 hours PRN Moderate Pain (4 - 6)      Allergies    aspirin (Hives)    Intolerances        DVT Prophylaxis: [x ] YES [ ] NO      Antibiotics: [ ] YES [x ] NO    Pain Scale (1-10):       Location:    Vital Signs Last 24 Hrs  T(C): 36.2 (24 May 2018 05:35), Max: 36.9 (23 May 2018 20:43)  T(F): 97.1 (24 May 2018 05:35), Max: 98.5 (23 May 2018 20:43)  HR: 87 (24 May 2018 05:35) (81 - 99)  BP: 134/85 (24 May 2018 05:35) (129/87 - 157/97)  BP(mean): --  RR: 15 (24 May 2018 05:35) (15 - 16)  SpO2: 97% (24 May 2018 05:35) (94% - 97%)    Drug Dosing Weight  Height (cm): 167.64 (17 May 2018 13:12)  Weight (kg): 100 (17 May 2018 13:12)  BMI (kg/m2): 35.6 (17 May 2018 13:12)  BSA (m2): 2.08 (17 May 2018 13:12)    PHYSICAL EXAM:      Constitutional: no complaints.  Eyes: conjunctiva pink. No double vision on left lateral gaze.  ENMT: buccal mucosa without evidence of mucostomatitis.  Neck: no masses.  Back: No SPT.  Respiratory: chest clear.  Cardiovascular: S1>S2 at apex. RSR.  Gastrointestinal: soft, nontender, active bowel sounds.  Genitourinary: voiding without difficulty.  Extremities: no leg edema.  Vascular: radial pulses equal bilaterally.  Neurological: no gross focal deficits.  Skin: warm and dry.  Lymph Nodes: none palp.  Musculoskeletal: full ROM.  Psychiatric: affect normal.        URINARY CATHETER: [ ] YES [x ] NO     LABS:  CBC Full  -  ( 23 May 2018 06:43 )  WBC Count : 7.0 K/uL  Hemoglobin : 11.6 g/dL  Hematocrit : 37.2 %  Platelet Count - Automated : 203 K/uL  Mean Cell Volume : 78.2 fL  Mean Cell Hemoglobin : 24.4 pg  Mean Cell Hemoglobin Concentration : 31.2 g/dL  Auto Neutrophil # : x  Auto Lymphocyte # : x  Auto Monocyte # : x  Auto Eosinophil # : x  Auto Basophil # : x  Auto Neutrophil % : x  Auto Lymphocyte % : x  Auto Monocyte % : x  Auto Eosinophil % : x  Auto Basophil % : x    05-23    135  |  100  |  16  ----------------------------<  122<H>  4.2   |  23  |  0.59    Ca    8.6      23 May 2018 06:43  Phos  2.9     05-23  Mg     2.0     05-23    TPro  6.4  /  Alb  3.6  /  TBili  0.4  /  DBili  x   /  AST  11  /  ALT  17  /  AlkPhos  53  05-23    PT/INR - ( 22 May 2018 08:04 )   PT: 14.2 sec;   INR: 1.27          PTT - ( 22 May 2018 08:04 )  PTT:32.0 sec      CULTURES:    RADIOLOGY & ADDITIONAL STUDIES:

## 2018-05-24 NOTE — DISCHARGE NOTE ADULT - HOSPITAL COURSE
49 yo female w/ PMH OA, HTN, anxiety and T4N1 SCC of right mandible s/p segmental mandibulectomy, right neck dissection,  fibula MVFF reconstruction w/ RT completed 12/5/17. Pt presented to [facility] ED with complaints progressive headache, tongue irritation and changes in sensation, dysphagia and double vision with far left lateral gaze noted by outpatient ophthalmologist. Hematology/oncology was consulted;  Head CT on 5/9/18 revealed an aggressive large heterogeneous soft tissue lesion centered within the clivus with associated osseous erosion. A maxillofacial CT scan showed a lytic lesion in the odontoid process, and a new lytic lesion in the C5 vertebral body. A PET/CT on 5/18/18 revealed a large central skull base mass that was markedly hypermetabolic and a hypermetabolic lesion in the C5 vertebral body, consistent with metastatic disease. The patient underwent nasal endoscopy and biopsy of the left sphenoid sinus and left clivus on 5/17; frozen specimen shows squamous cell histology, final biopsy results pending. PICC was placed on 5/21 w/ subsequent initiation of subsequent initiation of chemotherapy(Docetaxel: 75 mg/m2, Cisplatin: 75 mg/m2 and 5-FU: 1000 mg/m2). During the five day course of chemotherapy, tumor lysis labs and CBC remained WNL and the patient's pain/nausea remained in control w/ dexamethasone and ondansetron. Plan is for radiation treatment outpatient in the setting of metastatic disease.     On the day of discharge, the patient was seen and examined. Symptoms improved. Vital signs are stable. Labs and imaging reviewed. Patient is medically optimized and hemodynamically stable. Return precautions discussed, medication teach back done, and importance of physician followup emphasized. The patient verbalized understanding. 49 yo female w/ PMH OA, HTN, anxiety and T4N1 SCC of right mandible s/p segmental mandibulectomy, right neck dissection,  fibula MVFF reconstruction w/ RT completed 12/5/17. Pt presented to [facility] ED with complaints progressive headache, tongue irritation and changes in sensation, dysphagia and double vision with far left lateral gaze noted by outpatient ophthalmologist. Hematology/oncology was consulted;  Head CT on 5/9/18 revealed an aggressive large heterogeneous soft tissue lesion centered within the clivus with associated osseous erosion. A maxillofacial CT scan showed a lytic lesion in the odontoid process, and a new lytic lesion in the C5 vertebral body. A PET/CT on 5/18/18 revealed a large central skull base mass that was markedly hypermetabolic and a hypermetabolic lesion in the C5 vertebral body, consistent with metastatic disease. The patient underwent nasal endoscopy and biopsy of the left sphenoid sinus and left clivus on 5/17; frozen specimen shows squamous cell histology, final biopsy results pending. PICC was placed on 5/21 w/ subsequent initiation of subsequent initiation of chemotherapy(Docetaxel: 75 mg/m2, Cisplatin: 75 mg/m2 and 5-FU: 1000 mg/m2). During the five day course of chemotherapy, tumor lysis labs and CBC remained WNL and the patient's pain/nausea remained in control w/ dexamethasone and ondansetron. After chemotherapy, patient was placed on Neulasta for immunostimulation; at this time, patient was ready for discharge with PICC line care set in place for home with plans for two more rounds of chemotherapy q3 weeks dependent on initial change in tumor burden.     On the day of discharge, the patient was seen and examined. Symptoms improved. Vital signs are stable. Labs and imaging reviewed. Patient is medically optimized and hemodynamically stable. Return precautions discussed, medication teach back done, and importance of physician followup emphasized. The patient verbalized understanding.

## 2018-05-24 NOTE — DISCHARGE NOTE ADULT - ADDITIONAL INSTRUCTIONS
Please follow up with PICC line care at the [facility]; please call [phone] to make a weekly (Monday) appointment for proper PICC line care. Please followup with Dr. Yi on Thursday June 7th.   You are scheduled for an MRI Tuesday June 12th.   Please follow up with PICC line care at the [facility]; please call [phone] to make a weekly (Monday) appointment for proper PICC line care. Please followup with Dr. Yi on Thursday June 7th.   You are scheduled for an MRI Tuesday June 12th.   Please follow up with PICC line care on June 1st near your home in [facility]. PICC care instructions provided.

## 2018-05-24 NOTE — PROGRESS NOTE ADULT - ATTENDING COMMENTS
Doing well so far on induction chemotherapy. The 5-FU infusion continues. Would like to give additional hydration s/p cisplatin chemotherapy.

## 2018-05-24 NOTE — DISCHARGE NOTE ADULT - MEDICATION SUMMARY - MEDICATIONS TO STOP TAKING
I will STOP taking the medications listed below when I get home from the hospital:    losartan-hydrochlorothiazide 50mg-12.5mg oral tablet  -- 1 tab(s) by mouth once a day    chlorhexidine 0.12% mucous membrane liquid  -- 15 milliliter(s) mucous membrane 2 times a day

## 2018-05-24 NOTE — DISCHARGE NOTE ADULT - PLAN OF CARE
To reduce your tumor burden and manage your associated pain in the setting of metastatic disease You were initially admitted to the hospital in the setting of difficulty swallowing, hoarse voice and headaches in the setting of head and neck cancer with recent completion of radiation therapy. Hematology oncology was consulted and suggested PET-CT scan and head CT. Head CT on 5/9/18 revealed an aggressive large heterogeneous soft tissue lesion centered within the clivus with associated osseous erosion. A maxillofacial CT scan showed a lytic lesion in the odontoid process, and a new lytic lesion in the C5 vertebral body. A PET/CT on 5/18/18 revealed a large central skull base mass that was markedly hypermetabolic and a hypermetabolic lesion in the C5 vertebral body, consistent with metastatic disease. Therefore, you underwent nasal endoscopy and biopsy of the left sphenoid sinus and left clivus on 5/17, a fresh frozen specimen showed squamous cell histology consistent with previous suspicion of metastatic disease. At this time, you were transferred over from the ENT service to [address] [place] [address] to undergo chemotherapy with docetaxel, cisplatin and 5-FU. You were monitored during your 4 day course of chemotherapy with no significant issues during your stay. Your pain was managed with tylenol and morphine as needed; your nausea and GI upset was managed with zofran, corticosteroids, and an antacid. You should follow-up with your hematologist/oncologist outpatient in order to decide on the plan for further treatment. To manage your hypertension while in the inpatient setting You have a previous history of hypertension, which was been managed with hydrochlorithiazide 12.5 mg and losartan 50 mg at home, was managed with losartan 50 mg in the hospital. Your hydrochlorithiazide was discontinued during the course of chemotherapy in order to keep you properly hydrated and avoid nephrotoxicity. To manage your gastroesophageal reflux during your hospital stay You have a previous history of GERD managed at home on famotidine; during your stay, you complained of some additional reflux in the setting of chemotherapy and were prescribed pantoprazole, which is another antacid. You should restart your home medications upon discharge and follow-up with your PCP To transition your care to the outpatient setting In order to ensure proper transition of care to the outpatient setting, please follow-up outpatient with Dr. Pope, as well as your primary care physician. You have a previous history of hypertension, which was been managed with hydrochlorithiazide 12.5 mg and losartan 50 mg at home. In the hospital, you had low blood pressure. We stopped your medications. Please do not continue your medications until you see your physician and they are resumed. If you measure your blood pressure at home and it is grater than 140/90 on two days in a row you may resume your medications. You have a previous history of hypertension, which was been managed with hydrochlorothiazide 12.5 mg and losartan 50 mg at home. In the hospital, you had low blood pressure. We stopped your medications. Please do not continue your medications until you see your physician and they are resumed. If you measure your blood pressure at home and it is grater than 140/90 on two days in a row you may resume your medications.

## 2018-05-24 NOTE — PROGRESS NOTE ADULT - SUBJECTIVE AND OBJECTIVE BOX
SUBJECTIVE:  Patient seen and examined at bedside. LEXIE overnight. Chemotherapy day #3 today. Patient has some complaints of acid reflux. No nausea or vomiting. No changes in bowel movements. Headaches are well controlled with tylenol, has not used morphine recently. Dysphagia is well controlled by sitting up in bed and eating slowly. Dysphonia has improved since admission. No other complaints at this time.       OBJECTIVE:  VITAL SIGNS:  T(C): 36.6 (05-24-18 @ 12:05), Max: 36.9 (05-23-18 @ 20:43)  HR: 100 (05-24-18 @ 12:05) (81 - 100)  BP: 123/83 (05-24-18 @ 12:05) (123/83 - 157/97)  RR: 16 (05-24-18 @ 12:05) (15 - 16)  SpO2: 97% (05-24-18 @ 12:05) (94% - 97%)    05-23-18 @ 07:01  -  05-24-18 @ 07:00  --------------------------------------------------------  IN: 682.8 mL / OUT: 900 mL / NET: -217.2 mL    05-24-18 @ 07:01  -  05-24-18 @ 12:21  --------------------------------------------------------  IN: 0 mL / OUT: 500 mL / NET: -500 mL    PHYSICAL EXAM   General: NAD, comfortable, AOx3  HEENT: NCAT, PERRL, clear conjunctiva, no scleral icterus  Neck: supple, no JVD, some fullness/ rigidity at the right mandible  Respiratory: CTA b/l, no wheezing, rhonchi, rales  Cardiovascular: RRR, normal S1S2, no M/R/G  Abdomen: soft, NT/ND, bowel sounds normoactive throughout, no palpable masses  Extremities: WWP, no clubbing, cyanosis, or edema  Neuro -  - Mental Status:  Alert, awake, oriented to person, place, and time; Speech is fluent with intact naming, repetition, and comprehension  - Cranial Nerves II-XII intact  - Motor:  Strength is 5/5 throughout.  There is no pronator drift.  Normal muscle bulk and tone throughout.  - Reflexes:  2+ and symmetric at the biceps, triceps, brachioradialis, knees, and ankles.  Plantar responses flexor.  - Sensory:  Intact to light touch and pin prick  - Coordination:  Finger-nose-finger and heel-knee-shin intact without dysmetria.  Rapid alternating hand movements intact.  - Gait:   Normal steps, base, arm swing, and turning.  Heel and toe walking are normal.  Tandem gait is normal.  Romberg testing is negative.    LABS                        11.9   7.2   )-----------( 188      ( 24 May 2018 07:46 )             37.2     05-24    133<L>  |  97  |  17  ----------------------------<  169<H>  3.6   |  24  |  0.63    Ca    8.7      24 May 2018 07:46  Phos  2.8     05-24  Mg     1.9     05-24    TPro  6.5  /  Alb  3.6  /  TBili  0.4  /  DBili  x   /  AST  16  /  ALT  22  /  AlkPhos  46  05-24

## 2018-05-24 NOTE — PROGRESS NOTE ADULT - ATTENDING COMMENTS
DX  HEAD AND NECK SCC- for Induction chemo. MRI orbit/face -reveals increasing size of a large central skull base mass, encases both   internal carotid arteries &  involves the right jugular foramen.   DYSPHAGIA DUE TO HEAD+NECK SCC- tolerating POs.   HYPONATREMIA -    dcd HCTZ  HTN  - cozaar .

## 2018-05-24 NOTE — DISCHARGE NOTE ADULT - CARE PROVIDER_API CALL
Obi Yi), Hematology; Internal Medicine; Medical Oncology  [address] [place] [address]  Phone: [phone]  Fax: [phone]

## 2018-05-24 NOTE — PROGRESS NOTE ADULT - PROBLEM SELECTOR PLAN 1
Right mandibular SCC s/p surgery and then RT. The patient now has disease involving the clivus and sphenoid sinus on the left. Induction chemotherapy to continue.

## 2018-05-24 NOTE — DISCHARGE NOTE ADULT - CARE PLAN
Principal Discharge DX:	SCC (squamous cell carcinoma)  Secondary Diagnosis:	HTN (hypertension)  Secondary Diagnosis:	Gastroesophageal reflux disease, esophagitis presence not specified  Secondary Diagnosis:	Transition of care performed with sharing of clinical summary Principal Discharge DX:	SCC (squamous cell carcinoma)  Goal:	To reduce your tumor burden and manage your associated pain in the setting of metastatic disease  Assessment and plan of treatment:	You were initially admitted to the hospital in the setting of difficulty swallowing, hoarse voice and headaches in the setting of head and neck cancer with recent completion of radiation therapy. Hematology oncology was consulted and suggested PET-CT scan and head CT. Head CT on 5/9/18 revealed an aggressive large heterogeneous soft tissue lesion centered within the clivus with associated osseous erosion. A maxillofacial CT scan showed a lytic lesion in the odontoid process, and a new lytic lesion in the C5 vertebral body. A PET/CT on 5/18/18 revealed a large central skull base mass that was markedly hypermetabolic and a hypermetabolic lesion in the C5 vertebral body, consistent with metastatic disease. Therefore, you underwent nasal endoscopy and biopsy of the left sphenoid sinus and left clivus on 5/17, a fresh frozen specimen showed squamous cell histology consistent with previous suspicion of metastatic disease. At this time, you were transferred over from the ENT service to [address] [place] [address] to undergo chemotherapy with docetaxel, cisplatin and 5-FU. You were monitored during your 4 day course of chemotherapy with no significant issues during your stay. Your pain was managed with tylenol and morphine as needed; your nausea and GI upset was managed with zofran, corticosteroids, and an antacid. You should follow-up with your hematologist/oncologist outpatient in order to decide on the plan for further treatment.  Secondary Diagnosis:	HTN (hypertension)  Goal:	To manage your hypertension while in the inpatient setting  Assessment and plan of treatment:	You have a previous history of hypertension, which was been managed with hydrochlorithiazide 12.5 mg and losartan 50 mg at home, was managed with losartan 50 mg in the hospital. Your hydrochlorithiazide was discontinued during the course of chemotherapy in order to keep you properly hydrated and avoid nephrotoxicity.  Secondary Diagnosis:	Gastroesophageal reflux disease, esophagitis presence not specified  Goal:	To manage your gastroesophageal reflux during your hospital stay  Assessment and plan of treatment:	You have a previous history of GERD managed at home on famotidine; during your stay, you complained of some additional reflux in the setting of chemotherapy and were prescribed pantoprazole, which is another antacid. You should restart your home medications upon discharge and follow-up with your PCP  Secondary Diagnosis:	Transition of care performed with sharing of clinical summary Principal Discharge DX:	SCC (squamous cell carcinoma)  Goal:	To reduce your tumor burden and manage your associated pain in the setting of metastatic disease  Assessment and plan of treatment:	You were initially admitted to the hospital in the setting of difficulty swallowing, hoarse voice and headaches in the setting of head and neck cancer with recent completion of radiation therapy. Hematology oncology was consulted and suggested PET-CT scan and head CT. Head CT on 5/9/18 revealed an aggressive large heterogeneous soft tissue lesion centered within the clivus with associated osseous erosion. A maxillofacial CT scan showed a lytic lesion in the odontoid process, and a new lytic lesion in the C5 vertebral body. A PET/CT on 5/18/18 revealed a large central skull base mass that was markedly hypermetabolic and a hypermetabolic lesion in the C5 vertebral body, consistent with metastatic disease. Therefore, you underwent nasal endoscopy and biopsy of the left sphenoid sinus and left clivus on 5/17, a fresh frozen specimen showed squamous cell histology consistent with previous suspicion of metastatic disease. At this time, you were transferred over from the ENT service to [address] [place] [address] to undergo chemotherapy with docetaxel, cisplatin and 5-FU. You were monitored during your 4 day course of chemotherapy with no significant issues during your stay. Your pain was managed with tylenol and morphine as needed; your nausea and GI upset was managed with zofran, corticosteroids, and an antacid. You should follow-up with your hematologist/oncologist outpatient in order to decide on the plan for further treatment.  Secondary Diagnosis:	HTN (hypertension)  Goal:	To manage your hypertension while in the inpatient setting  Assessment and plan of treatment:	You have a previous history of hypertension, which was been managed with hydrochlorithiazide 12.5 mg and losartan 50 mg at home, was managed with losartan 50 mg in the hospital. Your hydrochlorithiazide was discontinued during the course of chemotherapy in order to keep you properly hydrated and avoid nephrotoxicity.  Secondary Diagnosis:	Gastroesophageal reflux disease, esophagitis presence not specified  Goal:	To manage your gastroesophageal reflux during your hospital stay  Assessment and plan of treatment:	You have a previous history of GERD managed at home on famotidine; during your stay, you complained of some additional reflux in the setting of chemotherapy and were prescribed pantoprazole, which is another antacid. You should restart your home medications upon discharge and follow-up with your PCP  Secondary Diagnosis:	Transition of care performed with sharing of clinical summary  Goal:	To transition your care to the outpatient setting  Assessment and plan of treatment:	In order to ensure proper transition of care to the outpatient setting, please follow-up outpatient with Dr. Pope, as well as your primary care physician. Principal Discharge DX:	SCC (squamous cell carcinoma)  Goal:	To reduce your tumor burden and manage your associated pain in the setting of metastatic disease  Assessment and plan of treatment:	You were initially admitted to the hospital in the setting of difficulty swallowing, hoarse voice and headaches in the setting of head and neck cancer with recent completion of radiation therapy. Hematology oncology was consulted and suggested PET-CT scan and head CT. Head CT on 5/9/18 revealed an aggressive large heterogeneous soft tissue lesion centered within the clivus with associated osseous erosion. A maxillofacial CT scan showed a lytic lesion in the odontoid process, and a new lytic lesion in the C5 vertebral body. A PET/CT on 5/18/18 revealed a large central skull base mass that was markedly hypermetabolic and a hypermetabolic lesion in the C5 vertebral body, consistent with metastatic disease. Therefore, you underwent nasal endoscopy and biopsy of the left sphenoid sinus and left clivus on 5/17, a fresh frozen specimen showed squamous cell histology consistent with previous suspicion of metastatic disease. At this time, you were transferred over from the ENT service to [address] [place] [address] to undergo chemotherapy with docetaxel, cisplatin and 5-FU. You were monitored during your 4 day course of chemotherapy with no significant issues during your stay. Your pain was managed with tylenol and morphine as needed; your nausea and GI upset was managed with zofran, corticosteroids, and an antacid. You should follow-up with your hematologist/oncologist outpatient in order to decide on the plan for further treatment.  Secondary Diagnosis:	HTN (hypertension)  Goal:	To manage your hypertension while in the inpatient setting  Assessment and plan of treatment:	You have a previous history of hypertension, which was been managed with hydrochlorithiazide 12.5 mg and losartan 50 mg at home. In the hospital, you had low blood pressure. We stopped your medications. Please do not continue your medications until you see your physician and they are resumed. If you measure your blood pressure at home and it is grater than 140/90 on two days in a row you may resume your medications.  Secondary Diagnosis:	Gastroesophageal reflux disease, esophagitis presence not specified  Goal:	To manage your gastroesophageal reflux during your hospital stay  Assessment and plan of treatment:	You have a previous history of GERD managed at home on famotidine; during your stay, you complained of some additional reflux in the setting of chemotherapy and were prescribed pantoprazole, which is another antacid. You should restart your home medications upon discharge and follow-up with your PCP  Secondary Diagnosis:	Transition of care performed with sharing of clinical summary  Goal:	To transition your care to the outpatient setting  Assessment and plan of treatment:	In order to ensure proper transition of care to the outpatient setting, please follow-up outpatient with Dr. Pope, as well as your primary care physician. Principal Discharge DX:	SCC (squamous cell carcinoma)  Goal:	To reduce your tumor burden and manage your associated pain in the setting of metastatic disease  Assessment and plan of treatment:	You were initially admitted to the hospital in the setting of difficulty swallowing, hoarse voice and headaches in the setting of head and neck cancer with recent completion of radiation therapy. Hematology oncology was consulted and suggested PET-CT scan and head CT. Head CT on 5/9/18 revealed an aggressive large heterogeneous soft tissue lesion centered within the clivus with associated osseous erosion. A maxillofacial CT scan showed a lytic lesion in the odontoid process, and a new lytic lesion in the C5 vertebral body. A PET/CT on 5/18/18 revealed a large central skull base mass that was markedly hypermetabolic and a hypermetabolic lesion in the C5 vertebral body, consistent with metastatic disease. Therefore, you underwent nasal endoscopy and biopsy of the left sphenoid sinus and left clivus on 5/17, a fresh frozen specimen showed squamous cell histology consistent with previous suspicion of metastatic disease. At this time, you were transferred over from the ENT service to [address] [place] [address] to undergo chemotherapy with docetaxel, cisplatin and 5-FU. You were monitored during your 4 day course of chemotherapy with no significant issues during your stay. Your pain was managed with tylenol and morphine as needed; your nausea and GI upset was managed with zofran, corticosteroids, and an antacid. You should follow-up with your hematologist/oncologist outpatient in order to decide on the plan for further treatment.  Secondary Diagnosis:	HTN (hypertension)  Goal:	To manage your hypertension while in the inpatient setting  Assessment and plan of treatment:	You have a previous history of hypertension, which was been managed with hydrochlorothiazide 12.5 mg and losartan 50 mg at home. In the hospital, you had low blood pressure. We stopped your medications. Please do not continue your medications until you see your physician and they are resumed. If you measure your blood pressure at home and it is grater than 140/90 on two days in a row you may resume your medications.  Secondary Diagnosis:	Gastroesophageal reflux disease, esophagitis presence not specified  Goal:	To manage your gastroesophageal reflux during your hospital stay  Assessment and plan of treatment:	You have a previous history of GERD managed at home on famotidine; during your stay, you complained of some additional reflux in the setting of chemotherapy and were prescribed pantoprazole, which is another antacid. You should restart your home medications upon discharge and follow-up with your PCP  Secondary Diagnosis:	Transition of care performed with sharing of clinical summary  Goal:	To transition your care to the outpatient setting  Assessment and plan of treatment:	In order to ensure proper transition of care to the outpatient setting, please follow-up outpatient with Dr. Pope, as well as your primary care physician.

## 2018-05-25 DIAGNOSIS — I10 ESSENTIAL (PRIMARY) HYPERTENSION: ICD-10-CM

## 2018-05-25 LAB
LDH SERPL L TO P-CCNC: 185 U/L — SIGNIFICANT CHANGE UP (ref 50–242)
LDH SERPL L TO P-CCNC: 186 U/L — SIGNIFICANT CHANGE UP (ref 50–242)
OSMOLALITY SERPL: 280 MOSM/KG — SIGNIFICANT CHANGE UP (ref 280–301)
PHOSPHATE SERPL-MCNC: 3.3 MG/DL — SIGNIFICANT CHANGE UP (ref 2.5–4.5)
PHOSPHATE SERPL-MCNC: 3.7 MG/DL — SIGNIFICANT CHANGE UP (ref 2.5–4.5)
URATE SERPL-MCNC: 4.2 MG/DL — SIGNIFICANT CHANGE UP (ref 2.5–7)

## 2018-05-25 PROCEDURE — 99233 SBSQ HOSP IP/OBS HIGH 50: CPT

## 2018-05-25 RX ORDER — POLYETHYLENE GLYCOL 3350 17 G/17G
17 POWDER, FOR SOLUTION ORAL
Qty: 0 | Refills: 0 | Status: DISCONTINUED | OUTPATIENT
Start: 2018-05-25 | End: 2018-05-28

## 2018-05-25 RX ORDER — CYCLOBENZAPRINE HYDROCHLORIDE 10 MG/1
10 TABLET, FILM COATED ORAL DAILY
Qty: 0 | Refills: 0 | Status: DISCONTINUED | OUTPATIENT
Start: 2018-05-25 | End: 2018-05-28

## 2018-05-25 RX ORDER — ALPRAZOLAM 0.25 MG
0.5 TABLET ORAL EVERY 8 HOURS
Qty: 0 | Refills: 0 | Status: DISCONTINUED | OUTPATIENT
Start: 2018-05-25 | End: 2018-05-28

## 2018-05-25 RX ORDER — POTASSIUM CHLORIDE 20 MEQ
20 PACKET (EA) ORAL ONCE
Qty: 0 | Refills: 0 | Status: COMPLETED | OUTPATIENT
Start: 2018-05-25 | End: 2018-05-25

## 2018-05-25 RX ADMIN — Medication 100 MILLIGRAM(S): at 06:50

## 2018-05-25 RX ADMIN — ENOXAPARIN SODIUM 40 MILLIGRAM(S): 100 INJECTION SUBCUTANEOUS at 11:35

## 2018-05-25 RX ADMIN — Medication 100 MILLIGRAM(S): at 17:35

## 2018-05-25 RX ADMIN — LOSARTAN POTASSIUM 50 MILLIGRAM(S): 100 TABLET, FILM COATED ORAL at 06:50

## 2018-05-25 RX ADMIN — Medication 20 MILLIEQUIVALENT(S): at 11:35

## 2018-05-25 RX ADMIN — PANTOPRAZOLE SODIUM 40 MILLIGRAM(S): 20 TABLET, DELAYED RELEASE ORAL at 06:50

## 2018-05-25 RX ADMIN — POLYETHYLENE GLYCOL 3350 17 GRAM(S): 17 POWDER, FOR SOLUTION ORAL at 17:35

## 2018-05-25 RX ADMIN — ONDANSETRON 4 MILLIGRAM(S): 8 TABLET, FILM COATED ORAL at 06:38

## 2018-05-25 RX ADMIN — SODIUM CHLORIDE 75 MILLILITER(S): 9 INJECTION INTRAMUSCULAR; INTRAVENOUS; SUBCUTANEOUS at 13:36

## 2018-05-25 RX ADMIN — ONDANSETRON 4 MILLIGRAM(S): 8 TABLET, FILM COATED ORAL at 13:38

## 2018-05-25 RX ADMIN — CYCLOBENZAPRINE HYDROCHLORIDE 10 MILLIGRAM(S): 10 TABLET, FILM COATED ORAL at 13:36

## 2018-05-25 NOTE — PROGRESS NOTE ADULT - PROBLEM SELECTOR PLAN 1
- Chemotherapy finished today (Docetaxel: 75 mg/m2, Cisplatin: 75 mg/m2 and 5-FU: 1000 mg/m2)   - Neulasta dose tomorrow  - Lovenox for DVT prophylaxis   - HCTZ discontinued in the setting of chemotherapy/use of nephrotoxic agents  - Tumor lysis labs WNL

## 2018-05-25 NOTE — PROGRESS NOTE ADULT - SUBJECTIVE AND OBJECTIVE BOX
SUBJECTIVE:  Patient seen and examined at bedside. LEXIE overnight. Patient complains of some GI upset, specifically nausea and reflux, which has been well controlled with ondansetron and pantoprazole. In addition, patient has not had a bowel movement for 3 day. Patient has no other complaints at this time. No fever or chills. Some malaise. No recent headaches. Dysphagia is well controlled with mechanical soft diet and slow chewing. Dysphonia has markedly improved.     OBJECTIVE:  VITAL SIGNS:  T(C): 36.7 (05-25-18 @ 11:14), Max: 36.7 (05-25-18 @ 05:04)  HR: 82 (05-25-18 @ 11:14) (71 - 100)  BP: 127/91 (05-25-18 @ 11:14) (123/83 - 153/88)  RR: 19 (05-25-18 @ 11:14) (16 - 19)  SpO2: 98% (05-25-18 @ 11:14) (97% - 98%)    05-24-18 @ 07:01  -  05-25-18 @ 07:00  --------------------------------------------------------  IN: 2723.2 mL / OUT: 3800 mL / NET: -1076.8 mL    05-25-18 @ 07:01  -  05-25-18 @ 11:57  --------------------------------------------------------  IN: 592 mL / OUT: 700 mL / NET: -108 mL      PHYSICAL EXAM   General: NAD, comfortable, AOx3  HEENT: NCAT, PERRL, clear conjunctiva, no scleral icterus  Neck: supple, no JVD  Respiratory: CTA b/l, no wheezing, rhonchi, rales  Cardiovascular: RRR, normal S1S2, no M/R/G  Abdomen: soft, NT/ND, bowel sounds normoactive throughout, no palpable masses  Extremities: WWP, no clubbing, cyanosis, or edema  Neuro -  - Mental Status:  Alert, awake, oriented to person, place, and time; Speech is fluent with intact naming, repetition, and comprehension  - Cranial Nerves II-XII intact  - Motor:  Strength is 5/5 throughout.  There is no pronator drift.  Normal muscle bulk and tone throughout.  - Reflexes:  2+ and symmetric at the biceps, triceps, brachioradialis, knees, and ankles.  Plantar responses flexor.  - Sensory:  Intact to light touch and pin prick  - Coordination:  Finger-nose-finger and heel-knee-shin intact without dysmetria.  Rapid alternating hand movements intact.  - Gait:   Normal steps, base, arm swing, and turning.  Heel and toe walking are normal.  Tandem gait is normal.  Romberg testing is negative.    LABS                        12.5   7.4   )-----------( 185      ( 25 May 2018 07:15 )             37.7     05-25    136  |  97  |  20  ----------------------------<  92  3.8   |  25  |  0.66    Ca    8.9      25 May 2018 07:15  Phos  3.3     05-25  Mg     1.9     05-25    TPro  6.6  /  Alb  3.7  /  TBili  0.7  /  DBili  x   /  AST  16  /  ALT  24  /  AlkPhos  48  05-25

## 2018-05-25 NOTE — PROGRESS NOTE ADULT - ASSESSMENT
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

## 2018-05-25 NOTE — PROGRESS NOTE ADULT - SUBJECTIVE AND OBJECTIVE BOX
The patient continues to tolerate ongoing induction chemotherapy well. She is constipated but passing flatus. She has some GI upset. No vomiting.    CHEMOTHERAPY REGIMEN:        Day:        4                  Diet:  Protocol:   docetaxel, cisplatin and infusional 5- FU.                                 IVF:      MEDICATIONS  (STANDING):  docusate sodium 100 milliGRAM(s) Oral two times a day  enoxaparin Injectable 40 milliGRAM(s) SubCutaneous daily  fluorouracil IVPB (eMAR) 2080 milliGRAM(s) IV Intermittent daily  losartan 50 milliGRAM(s) Oral daily  pantoprazole    Tablet 40 milliGRAM(s) Oral before breakfast  sodium chloride 0.9%. 1000 milliLiter(s) (75 mL/Hr) IV Continuous <Continuous>    MEDICATIONS  (PRN):  acetaminophen    Suspension. 650 milliGRAM(s) Oral every 6 hours PRN Mild Pain (1 - 3)  ondansetron Injectable 4 milliGRAM(s) IV Push every 4 hours PRN Nausea and/or Vomiting      Allergies    aspirin (Hives)    Intolerances        DVT Prophylaxis: [x ] YES [ ] NO      Antibiotics: [ ] YES [x ] NO    Pain Scale (1-10):       Location:    Vital Signs Last 24 Hrs  T(C): 36.7 (25 May 2018 05:04), Max: 36.7 (25 May 2018 05:04)  T(F): 98.1 (25 May 2018 05:04), Max: 98.1 (25 May 2018 05:04)  HR: 71 (25 May 2018 05:04) (71 - 100)  BP: 146/95 (25 May 2018 05:04) (123/83 - 153/88)  BP(mean): --  RR: 16 (25 May 2018 05:04) (16 - 18)  SpO2: 98% (25 May 2018 05:04) (97% - 98%)    Drug Dosing Weight  Height (cm): 167.64 (17 May 2018 13:12)  Weight (kg): 100 (17 May 2018 13:12)  BMI (kg/m2): 35.6 (17 May 2018 13:12)  BSA (m2): 2.08 (17 May 2018 13:12)    PHYSICAL EXAM:      Constitutional: notes GI upset.  Eyes: conjunctiva pink. No double vision an left lateral gaze.  ENMT:  no evidence of chemotherapy induced mucostomatitis.  Neck: no pain on palp over the C-spine.  Back: no SPT.  Respiratory: chest clear.  Cardiovascular: S1>S2 at apex. RSR.  Gastrointestinal: soft, nontender active bowel sounds.  Genitourinary: voiding without difficulty.   Extremities: no leg edema.  Vascular: radial pulses equal bilaterally.  Neurological: no gross focal deficits.  Skin: warm and dry.  Lymph Nodes: none palp.  Musculoskeletal: full ROM.  Psychiatric: affect normal.        URINARY CATHETER: [ ] YES [x ] NO     LABS:  CBC Full  -  ( 24 May 2018 07:46 )  WBC Count : 7.2 K/uL  Hemoglobin : 11.9 g/dL  Hematocrit : 37.2 %  Platelet Count - Automated : 188 K/uL  Mean Cell Volume : 77.2 fL  Mean Cell Hemoglobin : 24.7 pg  Mean Cell Hemoglobin Concentration : 32.0 g/dL  Auto Neutrophil # : x  Auto Lymphocyte # : x  Auto Monocyte # : x  Auto Eosinophil # : x  Auto Basophil # : x  Auto Neutrophil % : 95.0 %  Auto Lymphocyte % : 4.0 %  Auto Monocyte % : 1.0 %  Auto Eosinophil % : 0.0 %  Auto Basophil % : 0.0 %    05-24    133<L>  |  97  |  17  ----------------------------<  169<H>  3.6   |  24  |  0.63    Ca    8.7      24 May 2018 07:46  Phos  2.8     05-24  Mg     1.9     05-24    TPro  6.5  /  Alb  3.6  /  TBili  0.4  /  DBili  x   /  AST  16  /  ALT  22  /  AlkPhos  46  05-24          CULTURES:    RADIOLOGY & ADDITIONAL STUDIES:

## 2018-05-26 LAB
ALBUMIN SERPL ELPH-MCNC: 3.3 G/DL — SIGNIFICANT CHANGE UP (ref 3.3–5)
ALP SERPL-CCNC: 48 U/L — SIGNIFICANT CHANGE UP (ref 40–120)
ALT FLD-CCNC: 20 U/L — SIGNIFICANT CHANGE UP (ref 10–45)
ANION GAP SERPL CALC-SCNC: 12 MMOL/L — SIGNIFICANT CHANGE UP (ref 5–17)
AST SERPL-CCNC: 12 U/L — SIGNIFICANT CHANGE UP (ref 10–40)
BASOPHILS NFR BLD AUTO: 0 % — SIGNIFICANT CHANGE UP (ref 0–2)
BILIRUB SERPL-MCNC: 1.1 MG/DL — SIGNIFICANT CHANGE UP (ref 0.2–1.2)
BUN SERPL-MCNC: 16 MG/DL — SIGNIFICANT CHANGE UP (ref 7–23)
CALCIUM SERPL-MCNC: 8.5 MG/DL — SIGNIFICANT CHANGE UP (ref 8.4–10.5)
CHLORIDE SERPL-SCNC: 92 MMOL/L — LOW (ref 96–108)
CO2 SERPL-SCNC: 28 MMOL/L — SIGNIFICANT CHANGE UP (ref 22–31)
CREAT SERPL-MCNC: 0.72 MG/DL — SIGNIFICANT CHANGE UP (ref 0.5–1.3)
EOSINOPHIL NFR BLD AUTO: 0.7 % — SIGNIFICANT CHANGE UP (ref 0–6)
GLUCOSE SERPL-MCNC: 94 MG/DL — SIGNIFICANT CHANGE UP (ref 70–99)
HCT VFR BLD CALC: 37.2 % — SIGNIFICANT CHANGE UP (ref 34.5–45)
HGB BLD-MCNC: 12 G/DL — SIGNIFICANT CHANGE UP (ref 11.5–15.5)
LDH SERPL L TO P-CCNC: 143 U/L — SIGNIFICANT CHANGE UP (ref 50–242)
LYMPHOCYTES # BLD AUTO: 15.8 % — SIGNIFICANT CHANGE UP (ref 13–44)
MAGNESIUM SERPL-MCNC: 1.9 MG/DL — SIGNIFICANT CHANGE UP (ref 1.6–2.6)
MCHC RBC-ENTMCNC: 24.7 PG — LOW (ref 27–34)
MCHC RBC-ENTMCNC: 32.3 G/DL — SIGNIFICANT CHANGE UP (ref 32–36)
MCV RBC AUTO: 76.5 FL — LOW (ref 80–100)
MONOCYTES NFR BLD AUTO: 1.6 % — LOW (ref 2–14)
NEUTROPHILS NFR BLD AUTO: 81.9 % — HIGH (ref 43–77)
PHOSPHATE SERPL-MCNC: 4 MG/DL — SIGNIFICANT CHANGE UP (ref 2.5–4.5)
PLATELET # BLD AUTO: 135 K/UL — LOW (ref 150–400)
POTASSIUM SERPL-MCNC: 3.5 MMOL/L — SIGNIFICANT CHANGE UP (ref 3.5–5.3)
POTASSIUM SERPL-SCNC: 3.5 MMOL/L — SIGNIFICANT CHANGE UP (ref 3.5–5.3)
PROT SERPL-MCNC: 6.2 G/DL — SIGNIFICANT CHANGE UP (ref 6–8.3)
RBC # BLD: 4.86 M/UL — SIGNIFICANT CHANGE UP (ref 3.8–5.2)
RBC # FLD: 16.8 % — SIGNIFICANT CHANGE UP (ref 10.3–16.9)
SODIUM SERPL-SCNC: 132 MMOL/L — LOW (ref 135–145)
URATE SERPL-MCNC: 4.1 MG/DL — SIGNIFICANT CHANGE UP (ref 2.5–7)
WBC # BLD: 4.4 K/UL — SIGNIFICANT CHANGE UP (ref 3.8–10.5)
WBC # FLD AUTO: 4.4 K/UL — SIGNIFICANT CHANGE UP (ref 3.8–10.5)

## 2018-05-26 PROCEDURE — 99232 SBSQ HOSP IP/OBS MODERATE 35: CPT

## 2018-05-26 RX ORDER — SODIUM CHLORIDE 9 MG/ML
1000 INJECTION INTRAMUSCULAR; INTRAVENOUS; SUBCUTANEOUS
Qty: 0 | Refills: 0 | Status: DISCONTINUED | OUTPATIENT
Start: 2018-05-26 | End: 2018-05-28

## 2018-05-26 RX ORDER — PEGFILGRASTIM-CBQV 6 MG/.6ML
6 INJECTION, SOLUTION SUBCUTANEOUS ONCE
Qty: 0 | Refills: 0 | Status: COMPLETED | OUTPATIENT
Start: 2018-05-28 | End: 2018-05-28

## 2018-05-26 RX ORDER — ONDANSETRON 8 MG/1
4 TABLET, FILM COATED ORAL EVERY 6 HOURS
Qty: 0 | Refills: 0 | Status: DISCONTINUED | OUTPATIENT
Start: 2018-05-26 | End: 2018-05-28

## 2018-05-26 RX ORDER — SODIUM CHLORIDE 9 MG/ML
500 INJECTION INTRAMUSCULAR; INTRAVENOUS; SUBCUTANEOUS ONCE
Qty: 0 | Refills: 0 | Status: COMPLETED | OUTPATIENT
Start: 2018-05-26 | End: 2018-05-26

## 2018-05-26 RX ADMIN — ONDANSETRON 4 MILLIGRAM(S): 8 TABLET, FILM COATED ORAL at 23:30

## 2018-05-26 RX ADMIN — PANTOPRAZOLE SODIUM 40 MILLIGRAM(S): 20 TABLET, DELAYED RELEASE ORAL at 05:45

## 2018-05-26 RX ADMIN — LOSARTAN POTASSIUM 50 MILLIGRAM(S): 100 TABLET, FILM COATED ORAL at 05:43

## 2018-05-26 RX ADMIN — CYCLOBENZAPRINE HYDROCHLORIDE 10 MILLIGRAM(S): 10 TABLET, FILM COATED ORAL at 13:55

## 2018-05-26 RX ADMIN — POLYETHYLENE GLYCOL 3350 17 GRAM(S): 17 POWDER, FOR SOLUTION ORAL at 05:43

## 2018-05-26 RX ADMIN — SODIUM CHLORIDE 75 MILLILITER(S): 9 INJECTION INTRAMUSCULAR; INTRAVENOUS; SUBCUTANEOUS at 02:13

## 2018-05-26 RX ADMIN — ONDANSETRON 4 MILLIGRAM(S): 8 TABLET, FILM COATED ORAL at 06:27

## 2018-05-26 RX ADMIN — Medication 100 MILLIGRAM(S): at 05:45

## 2018-05-26 RX ADMIN — ONDANSETRON 4 MILLIGRAM(S): 8 TABLET, FILM COATED ORAL at 18:04

## 2018-05-26 RX ADMIN — FLUOROURACIL 43.4 MILLIGRAM(S): 50 INJECTION, SOLUTION INTRAVENOUS at 00:27

## 2018-05-26 RX ADMIN — ENOXAPARIN SODIUM 40 MILLIGRAM(S): 100 INJECTION SUBCUTANEOUS at 11:33

## 2018-05-26 RX ADMIN — SODIUM CHLORIDE 1000 MILLILITER(S): 9 INJECTION INTRAMUSCULAR; INTRAVENOUS; SUBCUTANEOUS at 01:24

## 2018-05-26 RX ADMIN — ONDANSETRON 4 MILLIGRAM(S): 8 TABLET, FILM COATED ORAL at 10:42

## 2018-05-26 RX ADMIN — Medication 100 MILLIGRAM(S): at 18:04

## 2018-05-26 RX ADMIN — POLYETHYLENE GLYCOL 3350 17 GRAM(S): 17 POWDER, FOR SOLUTION ORAL at 18:04

## 2018-05-26 NOTE — PROVIDER CONTACT NOTE (FALL NOTIFICATION) - ACTION/TREATMENT ORDERED:
NS 500cc bolus given; assessed by Dr Valle; placed on bedrest; checked for signs of injury and bleeding

## 2018-05-26 NOTE — PROGRESS NOTE ADULT - ATTENDING COMMENTS
47 yo F with SCC undergoing chemotherapy, s/p fall this AM with no injuries, feeling better  - continue chemo and hydration per heme/onc  - will continue to monitor BP  - will start dc planning potentially for monday

## 2018-05-26 NOTE — PROGRESS NOTE ADULT - SUBJECTIVE AND OBJECTIVE BOX
Patient is a 48y old  Female with squamous cell carinoma, today on last day of chemo therapy  Was hypotensive this morning, had a fall en route to the bathroom, no injuries sustained, feels better now, no pain  dizziness has improved, no SOB, no chest pain, some nausea, no vomiting, some constipation, no dysuria, tolerating PO    MEDICATIONS  (STANDING):  cyclobenzaprine 10 milliGRAM(s) Oral daily  docusate sodium 100 milliGRAM(s) Oral two times a day  enoxaparin Injectable 40 milliGRAM(s) SubCutaneous daily  fluorouracil IVPB (eMAR) 2080 milliGRAM(s) IV Intermittent daily  ondansetron Injectable 4 milliGRAM(s) IV Push every 6 hours  pantoprazole    Tablet 40 milliGRAM(s) Oral before breakfast  polyethylene glycol 3350 17 Gram(s) Oral two times a day  sodium chloride 0.9%. 1000 milliLiter(s) (75 mL/Hr) IV Continuous <Continuous>    MEDICATIONS  (PRN):  acetaminophen    Suspension. 650 milliGRAM(s) Oral every 6 hours PRN Mild Pain (1 - 3)  ALPRAZolam 0.5 milliGRAM(s) Oral every 8 hours PRN patient request. Anxiety.      Allergies    aspirin (Hives)    Intolerances          Vital Signs Last 24 Hrs  T(C): 36.9 (26 May 2018 11:09), Max: 36.9 (26 May 2018 11:09)  T(F): 98.5 (26 May 2018 11:09), Max: 98.5 (26 May 2018 11:09)  HR: 97 (26 May 2018 11:09) (73 - 97)  BP: 101/70 (26 May 2018 11:09) (97/64 - 117/81)  BP(mean): --  RR: 20 (26 May 2018 11:09) (14 - 20)  SpO2: 97% (26 May 2018 11:09) (94% - 98%)  CAPILLARY BLOOD GLUCOSE          05-25 @ 07:01  -  05-26 @ 07:00  --------------------------------------------------------  IN: 2674.6 mL / OUT: 4400 mL / NET: -1725.4 mL    05-26 @ 07:01  -  05-26 @ 13:53  --------------------------------------------------------  IN: 0 mL / OUT: 500 mL / NET: -500 mL        Physical Exam:      General:  Well appearing, NAD, not cachetic, obese, dry MM  HEENT:  Nonicteric, PERRLA  CV:  RRR, no murmur, no JVD  Lungs:  coarse breath sounds on right  Abdomen:  Soft, non-tender, no distended, positive BS, no hepatosplenomegaly  Extremities:  2+ pulses, no c/c, no edema  Skin:  Warm and dry, no rashes  :  No keller  Neuro:  AAOx3, non-focal, CN II-XII grossly intact  No Restraints    LABS:                        12.0   4.4   )-----------( 135      ( 26 May 2018 07:30 )             37.2     05-26    132<L>  |  92<L>  |  16  ----------------------------<  94  3.5   |  28  |  0.72    Ca    8.5      26 May 2018 07:30  Phos  4.0     05-26  Mg     1.9     05-26    TPro  6.2  /  Alb  3.3  /  TBili  1.1  /  DBili  x   /  AST  12  /  ALT  20  /  AlkPhos  48  05-26

## 2018-05-26 NOTE — PROVIDER CONTACT NOTE (FALL NOTIFICATION) - ASSESSMENT
called by PCA Jonathon, pt c/o dizziness and crying; pt stated when she stood up she was dizzy; pt was disconnected from IVF for safety; assessed prior to incident was able to stand and walk w/o complaint; during ambulation, pt was losing balance and c/o weakness; pt lowered to her knees and placed on wheelchair to bed; SBP 97-98

## 2018-05-26 NOTE — PROGRESS NOTE ADULT - ATTENDING COMMENTS
Blood counts, chems and LFTs all look good so far. Continue to monitor. Would stop daily LDH and uric acid testing. Continue IV hydration.

## 2018-05-26 NOTE — PROGRESS NOTE ADULT - SUBJECTIVE AND OBJECTIVE BOX
Oncology Progress Note    Interval Hx: C1D4 of induction DCF for stage IV oral cavity SCC. She had an episode of lightheadedness and falling to knees on the way to the bathroom. She was also noted to be hypotensive overnight. No fevers/chills and vitals otherwise stable. She had an episode of nausea/vomiting and complains of burning sensation in stomach but improved since admission. No abdominal pain or diarrhea.     She will be completing 5-FU by 1AM tonight.     MEDICATIONS  (STANDING):  MEDICATIONS  (STANDING):  cyclobenzaprine 10 milliGRAM(s) Oral daily  docusate sodium 100 milliGRAM(s) Oral two times a day  enoxaparin Injectable 40 milliGRAM(s) SubCutaneous daily  fluorouracil IVPB (eMAR) 2080 milliGRAM(s) IV Intermittent daily  ondansetron Injectable 4 milliGRAM(s) IV Push every 6 hours  pantoprazole    Tablet 40 milliGRAM(s) Oral before breakfast  polyethylene glycol 3350 17 Gram(s) Oral two times a day  sodium chloride 0.9%. 1000 milliLiter(s) (75 mL/Hr) IV Continuous <Continuous>    MEDICATIONS  (PRN):  acetaminophen    Suspension. 650 milliGRAM(s) Oral every 6 hours PRN Mild Pain (1 - 3)  ALPRAZolam 0.5 milliGRAM(s) Oral every 8 hours PRN patient request. Anxiety.      Allergies    aspirin (Hives)    Intolerances          PHYSICAL EXAM:  T(C): 36.9 (05-26-18 @ 11:09), Max: 36.9 (05-26-18 @ 11:09)  T(F): 98.5 (05-26-18 @ 11:09), Max: 98.5 (05-26-18 @ 11:09)  HR: 97 (05-26-18 @ 11:09) (73 - 97)  BP: 101/70 (05-26-18 @ 11:09) (97/64 - 117/81)  ABP: --  ABP(mean): --  RR: 20 (05-26-18 @ 11:09) (14 - 20)  SpO2: 97% (05-26-18 @ 11:09) (94% - 98%)      Constitutional: In good spirits, AAOx3  HEENT: NCAT, no conjunctival pallor, no scleral icterus, no masses noted, tan line noted demarcating previous radiation field   Neck: supple, no jvd  Cardio: RRR, Normal S1S2  Pulm: CTABL, no wheezing   Abd: Soft, nt/nd, +BS, no hepatomegaly  Ext: No peripheral edema, distal pulses intact  Musculoskeletal: Normal ROM  Neuro: CN II-XII intact       LABS:                           12.0   4.4   )-----------( 135      ( 26 May 2018 07:30 )             37.2         CBC Full  -  ( 26 May 2018 07:30 )  WBC Count : 4.4 K/uL  Hemoglobin : 12.0 g/dL  Hematocrit : 37.2 %  Platelet Count - Automated : 135 K/uL  Mean Cell Volume : 76.5 fL  Mean Cell Hemoglobin : 24.7 pg  Mean Cell Hemoglobin Concentration : 32.3 g/dL  Auto Neutrophil # : x  Auto Lymphocyte # : x  Auto Monocyte # : x  Auto Eosinophil # : x  Auto Basophil # : x  Auto Neutrophil % : 81.9 %  Auto Lymphocyte % : 15.8 %  Auto Monocyte % : 1.6 %  Auto Eosinophil % : 0.7 %  Auto Basophil % : 0.0 %        05-26    132<L>  |  92<L>  |  16  ----------------------------<  94  3.5   |  28  |  0.72    Ca    8.5      26 May 2018 07:30  Phos  4.0     05-26  Mg     1.9     05-26    TPro  6.2  /  Alb  3.3  /  TBili  1.1  /  DBili  x   /  AST  12  /  ALT  20  /  AlkPhos  48  05-26

## 2018-05-26 NOTE — PROGRESS NOTE ADULT - ASSESSMENT
The patient had an episode of lightheadedness with associated low BP. She received IV hydration and her BP improved. She is worn out by the chemotherapy. This is her last day of chemotherapy.

## 2018-05-26 NOTE — PROGRESS NOTE ADULT - SUBJECTIVE AND OBJECTIVE BOX
The patient feels weak this am. She had some dizziness related to a trip to the bathroom overnight. No dizziness now. Appetite is poor. She does not feel like eating. She has nausea at times. Ondansetron is helpful for this. She also has reflux discomfort.    CHEMOTHERAPY REGIMEN:        Day:          5                Diet:  Protocol:  Docetaxel, cisplatin, last bag of 5-FU running now.                                  IVF:      MEDICATIONS  (STANDING):  cyclobenzaprine 10 milliGRAM(s) Oral daily  docusate sodium 100 milliGRAM(s) Oral two times a day  enoxaparin Injectable 40 milliGRAM(s) SubCutaneous daily  fluorouracil IVPB (eMAR) 2080 milliGRAM(s) IV Intermittent daily  losartan 50 milliGRAM(s) Oral daily  pantoprazole    Tablet 40 milliGRAM(s) Oral before breakfast  polyethylene glycol 3350 17 Gram(s) Oral two times a day  sodium chloride 0.9%. 1000 milliLiter(s) (75 mL/Hr) IV Continuous <Continuous>    MEDICATIONS  (PRN):  acetaminophen    Suspension. 650 milliGRAM(s) Oral every 6 hours PRN Mild Pain (1 - 3)  ALPRAZolam 0.5 milliGRAM(s) Oral every 8 hours PRN patient request. Anxiety.  ondansetron Injectable 4 milliGRAM(s) IV Push every 4 hours PRN Nausea and/or Vomiting      Allergies    aspirin (Hives)    Intolerances        DVT Prophylaxis: [x ] YES [ ] NO      Antibiotics: [ ] YES [x ] NO    Pain Scale (1-10):       Location:    Vital Signs Last 24 Hrs  T(C): 36.7 (26 May 2018 05:18), Max: 36.7 (25 May 2018 11:14)  T(F): 98.1 (26 May 2018 05:18), Max: 98.1 (26 May 2018 05:18)  HR: 82 (26 May 2018 05:18) (73 - 84)  BP: 113/77 (26 May 2018 05:18) (97/64 - 127/91)  BP(mean): --  RR: 16 (26 May 2018 05:18) (14 - 19)  SpO2: 94% (26 May 2018 05:18) (94% - 98%)    Drug Dosing Weight  Height (cm): 167.64 (17 May 2018 13:12)  Weight (kg): 100 (17 May 2018 13:12)  BMI (kg/m2): 35.6 (17 May 2018 13:12)  BSA (m2): 2.08 (17 May 2018 13:12)    PHYSICAL EXAM:      Constitutional: fatigued related to ongoing induction chemotherapy.  Eyes: conjunctiva pink.  ENMT: no evidence of mucostomatitis.  Neck: no masses palp. No pain on palp over the C-spine.  Back: no SPT.  Respiratory: chest clear.  Cardiovascular: S1>S2 at apex. RSR.  Gastrointestinal: soft, nontender, active bowel sounds.  Genitourinary: voiding without difficulty.  Extremities: no leg edema.  Vascular: radial pulses equal bilaterally.  Neurological: no gross focal deficits.  Skin: back is moist.  Lymph Nodes: none palp.  Musculoskeletal: full ROM.  Psychiatric: affect normal.        URINARY CATHETER: [ ] YES [x ] NO     LABS:  CBC Full  -  ( 25 May 2018 07:15 )  WBC Count : 7.4 K/uL  Hemoglobin : 12.5 g/dL  Hematocrit : 37.7 %  Platelet Count - Automated : 185 K/uL  Mean Cell Volume : 76.0 fL  Mean Cell Hemoglobin : 25.2 pg  Mean Cell Hemoglobin Concentration : 33.2 g/dL  Auto Neutrophil # : x  Auto Lymphocyte # : x  Auto Monocyte # : x  Auto Eosinophil # : x  Auto Basophil # : x  Auto Neutrophil % : x  Auto Lymphocyte % : x  Auto Monocyte % : x  Auto Eosinophil % : x  Auto Basophil % : x    05-25    136  |  97  |  20  ----------------------------<  92  3.8   |  25  |  0.66    Ca    8.9      25 May 2018 07:15  Phos  3.7     05-25  Mg     1.9     05-25    TPro  6.6  /  Alb  3.7  /  TBili  0.7  /  DBili  x   /  AST  16  /  ALT  24  /  AlkPhos  48  05-25          CULTURES:    RADIOLOGY & ADDITIONAL STUDIES:

## 2018-05-27 LAB
ALBUMIN SERPL ELPH-MCNC: 3.2 G/DL — LOW (ref 3.3–5)
ALP SERPL-CCNC: 42 U/L — SIGNIFICANT CHANGE UP (ref 40–120)
ALT FLD-CCNC: 17 U/L — SIGNIFICANT CHANGE UP (ref 10–45)
ANION GAP SERPL CALC-SCNC: 13 MMOL/L — SIGNIFICANT CHANGE UP (ref 5–17)
AST SERPL-CCNC: 15 U/L — SIGNIFICANT CHANGE UP (ref 10–40)
BILIRUB SERPL-MCNC: 0.8 MG/DL — SIGNIFICANT CHANGE UP (ref 0.2–1.2)
BUN SERPL-MCNC: 12 MG/DL — SIGNIFICANT CHANGE UP (ref 7–23)
CALCIUM SERPL-MCNC: 8.4 MG/DL — SIGNIFICANT CHANGE UP (ref 8.4–10.5)
CHLORIDE SERPL-SCNC: 96 MMOL/L — SIGNIFICANT CHANGE UP (ref 96–108)
CO2 SERPL-SCNC: 26 MMOL/L — SIGNIFICANT CHANGE UP (ref 22–31)
CREAT SERPL-MCNC: 0.7 MG/DL — SIGNIFICANT CHANGE UP (ref 0.5–1.3)
EOSINOPHIL NFR BLD AUTO: 0.9 % — SIGNIFICANT CHANGE UP (ref 0–6)
GLUCOSE SERPL-MCNC: 102 MG/DL — HIGH (ref 70–99)
HCT VFR BLD CALC: 36.2 % — SIGNIFICANT CHANGE UP (ref 34.5–45)
HGB BLD-MCNC: 11.4 G/DL — LOW (ref 11.5–15.5)
LDH SERPL L TO P-CCNC: 229 U/L — SIGNIFICANT CHANGE UP (ref 50–242)
LYMPHOCYTES # BLD AUTO: 18.2 % — SIGNIFICANT CHANGE UP (ref 13–44)
MAGNESIUM SERPL-MCNC: 2 MG/DL — SIGNIFICANT CHANGE UP (ref 1.6–2.6)
MCHC RBC-ENTMCNC: 24.6 PG — LOW (ref 27–34)
MCHC RBC-ENTMCNC: 31.5 G/DL — LOW (ref 32–36)
MCV RBC AUTO: 78.2 FL — LOW (ref 80–100)
MONOCYTES NFR BLD AUTO: 1.2 % — LOW (ref 2–14)
NEUTROPHILS NFR BLD AUTO: 79.7 % — HIGH (ref 43–77)
PHOSPHATE SERPL-MCNC: 3.2 MG/DL — SIGNIFICANT CHANGE UP (ref 2.5–4.5)
PLATELET # BLD AUTO: 141 K/UL — LOW (ref 150–400)
POTASSIUM SERPL-MCNC: 4 MMOL/L — SIGNIFICANT CHANGE UP (ref 3.5–5.3)
POTASSIUM SERPL-SCNC: 4 MMOL/L — SIGNIFICANT CHANGE UP (ref 3.5–5.3)
PROT SERPL-MCNC: 6.4 G/DL — SIGNIFICANT CHANGE UP (ref 6–8.3)
RBC # BLD: 4.63 M/UL — SIGNIFICANT CHANGE UP (ref 3.8–5.2)
RBC # FLD: 16.7 % — SIGNIFICANT CHANGE UP (ref 10.3–16.9)
SODIUM SERPL-SCNC: 135 MMOL/L — SIGNIFICANT CHANGE UP (ref 135–145)
URATE SERPL-MCNC: 3.9 MG/DL — SIGNIFICANT CHANGE UP (ref 2.5–7)
WBC # BLD: 3.2 K/UL — LOW (ref 3.8–10.5)
WBC # FLD AUTO: 3.2 K/UL — LOW (ref 3.8–10.5)

## 2018-05-27 PROCEDURE — 99232 SBSQ HOSP IP/OBS MODERATE 35: CPT

## 2018-05-27 RX ADMIN — ONDANSETRON 4 MILLIGRAM(S): 8 TABLET, FILM COATED ORAL at 23:59

## 2018-05-27 RX ADMIN — POLYETHYLENE GLYCOL 3350 17 GRAM(S): 17 POWDER, FOR SOLUTION ORAL at 17:29

## 2018-05-27 RX ADMIN — POLYETHYLENE GLYCOL 3350 17 GRAM(S): 17 POWDER, FOR SOLUTION ORAL at 06:16

## 2018-05-27 RX ADMIN — PANTOPRAZOLE SODIUM 40 MILLIGRAM(S): 20 TABLET, DELAYED RELEASE ORAL at 07:03

## 2018-05-27 RX ADMIN — SODIUM CHLORIDE 100 MILLILITER(S): 9 INJECTION INTRAMUSCULAR; INTRAVENOUS; SUBCUTANEOUS at 21:35

## 2018-05-27 RX ADMIN — ENOXAPARIN SODIUM 40 MILLIGRAM(S): 100 INJECTION SUBCUTANEOUS at 12:03

## 2018-05-27 RX ADMIN — Medication 100 MILLIGRAM(S): at 06:16

## 2018-05-27 RX ADMIN — ONDANSETRON 4 MILLIGRAM(S): 8 TABLET, FILM COATED ORAL at 12:03

## 2018-05-27 RX ADMIN — SODIUM CHLORIDE 100 MILLILITER(S): 9 INJECTION INTRAMUSCULAR; INTRAVENOUS; SUBCUTANEOUS at 10:29

## 2018-05-27 RX ADMIN — ONDANSETRON 4 MILLIGRAM(S): 8 TABLET, FILM COATED ORAL at 17:29

## 2018-05-27 RX ADMIN — CYCLOBENZAPRINE HYDROCHLORIDE 10 MILLIGRAM(S): 10 TABLET, FILM COATED ORAL at 13:10

## 2018-05-27 RX ADMIN — Medication 0.5 MILLIGRAM(S): at 18:29

## 2018-05-27 RX ADMIN — Medication 100 MILLIGRAM(S): at 17:29

## 2018-05-27 RX ADMIN — ONDANSETRON 4 MILLIGRAM(S): 8 TABLET, FILM COATED ORAL at 06:16

## 2018-05-27 NOTE — PROGRESS NOTE ADULT - ASSESSMENT
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 Hx of T4N1 SCC of right mandible s/p segmental mandibulectomy, right neck dissection, and fibula MVFF reconstruction 7/2017. RT completed 12/5/17. MRI face/orbits shows worsening disease from previous MRI in Feb 2018.     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 C1D5 of induction DCF. Chemotherapy completed overnight.     -5-FU completed at 2AM; Neulasta will be given on 5/28 at 2AM  -Encourage ambulation and oral hydration  -CTM lytes and replenish as needed   -MRI Brain as an outpatient to evaluate further management (induction DCF vs definitive concurrent chemoradiotherapy)  -Pancytopenia secondary to chemotherapy, will CTM and Neulasta tomorrow     #Nausea/Vomiting  Secondary to chemotherapy; Now resolved.  -Recommend transitioning back to Zofran PRN

## 2018-05-27 NOTE — PROGRESS NOTE ADULT - PROBLEM SELECTOR PLAN 1
- Chemotherapy finished 5/26 (Docetaxel: 75 mg/m2, Cisplatin: 75 mg/m2 and 5-FU: 1000 mg/m2)   - Neulasta dose today  - Lovenox for DVT prophylaxis   - HCTZ discontinued in the setting of chemotherapy/use of nephrotoxic agents  - Tumor lysis labs WNL

## 2018-05-27 NOTE — PROGRESS NOTE ADULT - SUBJECTIVE AND OBJECTIVE BOX
The patient feels better this am. No further lightheadedness. She has nausea at times. Ondansetron continues to be helpful for this.     CHEMOTHERAPY REGIMEN:        Day:                          Diet:  Protocol: DCF induction chemotherapy completed.                                 IVF:      MEDICATIONS  (STANDING):  cyclobenzaprine 10 milliGRAM(s) Oral daily  docusate sodium 100 milliGRAM(s) Oral two times a day  enoxaparin Injectable 40 milliGRAM(s) SubCutaneous daily  ondansetron Injectable 4 milliGRAM(s) IV Push every 6 hours  pantoprazole    Tablet 40 milliGRAM(s) Oral before breakfast  polyethylene glycol 3350 17 Gram(s) Oral two times a day  sodium chloride 0.9%. 1000 milliLiter(s) (100 mL/Hr) IV Continuous <Continuous>    MEDICATIONS  (PRN):  acetaminophen    Suspension. 650 milliGRAM(s) Oral every 6 hours PRN Mild Pain (1 - 3)  ALPRAZolam 0.5 milliGRAM(s) Oral every 8 hours PRN patient request. Anxiety.      Allergies    aspirin (Hives)    Intolerances        DVT Prophylaxis: [x ] YES [ ] NO      Antibiotics: [ ] YES [x ] NO    Pain Scale (1-10):       Location:    Vital Signs Last 24 Hrs  T(C): 36.9 (27 May 2018 05:35), Max: 37.4 (26 May 2018 16:27)  T(F): 98.4 (27 May 2018 05:35), Max: 99.3 (26 May 2018 16:27)  HR: 96 (27 May 2018 05:35) (94 - 100)  BP: 111/77 (27 May 2018 05:35) (98/71 - 111/77)  BP(mean): --  RR: 18 (27 May 2018 05:35) (18 - 20)  SpO2: 97% (27 May 2018 05:35) (97% - 99%)    Drug Dosing Weight  Height (cm): 167.64 (17 May 2018 13:12)  Weight (kg): 100 (17 May 2018 13:12)  BMI (kg/m2): 35.6 (17 May 2018 13:12)  BSA (m2): 2.08 (17 May 2018 13:12)    PHYSICAL EXAM:      Constitutional: feeling better.  Eyes: conjunctiva pink.  ENMT: no evidence of mucostomatitis.  Neck: no pain on palp over the C-spine.  Back: no SPT.  Respiratory: chest clear.  Cardiovascular: S1>S2 at apex. RSR.  Gastrointestinal: soft, nontender, active bowel sounds.  Genitourinary: voiding without difficulty.  Extremities: no leg edema.  Neurological: no gross focal deficits.  Skin: warm and dry.  Lymph Nodes: none palp.  Musculoskeletal: full ROM.  Psychiatric: affect normal.        URINARY CATHETER: [ ] YES [x] NO     LABS:  CBC Full  -  ( 26 May 2018 07:30 )  WBC Count : 4.4 K/uL  Hemoglobin : 12.0 g/dL  Hematocrit : 37.2 %  Platelet Count - Automated : 135 K/uL  Mean Cell Volume : 76.5 fL  Mean Cell Hemoglobin : 24.7 pg  Mean Cell Hemoglobin Concentration : 32.3 g/dL  Auto Neutrophil # : x  Auto Lymphocyte # : x  Auto Monocyte # : x  Auto Eosinophil # : x  Auto Basophil # : x  Auto Neutrophil % : 81.9 %  Auto Lymphocyte % : 15.8 %  Auto Monocyte % : 1.6 %  Auto Eosinophil % : 0.7 %  Auto Basophil % : 0.0 %    05-26    132<L>  |  92<L>  |  16  ----------------------------<  94  3.5   |  28  |  0.72    Ca    8.5      26 May 2018 07:30  Phos  4.0     05-26  Mg     1.9     05-26    TPro  6.2  /  Alb  3.3  /  TBili  1.1  /  DBili  x   /  AST  12  /  ALT  20  /  AlkPhos  48  05-26          CULTURES:    RADIOLOGY & ADDITIONAL STUDIES:

## 2018-05-27 NOTE — PROGRESS NOTE ADULT - ATTENDING COMMENTS
Pt seen and examined  Agree with above  pt appears to have tolerated chemotherapy well, now on IV hydration  Will receive neulasta prior to discharge, planned for tomorrow morning as last dose of chemo was yesterday at night and pt requires 24 hours hydration.   appreciate heme/onc recs.

## 2018-05-27 NOTE — PROGRESS NOTE ADULT - SUBJECTIVE AND OBJECTIVE BOX
OVERNIGHT EVENTS: LEXIE    SUBJECTIVE / INTERVAL HPI: Patient seen and examined at bedside. Pt complained of continued constipation this AM. Otherwise, ROS neg.    VITAL SIGNS:  Vital Signs Last 24 Hrs  T(C): 36.9 (27 May 2018 05:35), Max: 37.4 (26 May 2018 16:27)  T(F): 98.4 (27 May 2018 05:35), Max: 99.3 (26 May 2018 16:27)  HR: 96 (27 May 2018 05:35) (94 - 100)  BP: 111/77 (27 May 2018 05:35) (98/71 - 111/77)  RR: 18 (27 May 2018 05:35) (18 - 20)  SpO2: 97% (27 May 2018 05:35) (97% - 99%)    PHYSICAL EXAM:  General: NAD, comfortable, AOx3  HEENT: NCAT, PERRL, clear conjunctiva, no scleral icterus  Neck: supple, no JVD  Respiratory: CTA b/l, no wheezing, rhonchi, rales  Cardiovascular: RRR, normal S1S2, no M/R/G  Abdomen: soft, NT/ND, bowel sounds normoactive throughout, no palpable masses  Extremities: WWP, no clubbing, cyanosis, or edema  Neuro: AAOx3, no neuro deficits    MEDICATIONS:  MEDICATIONS  (STANDING):  cyclobenzaprine 10 milliGRAM(s) Oral daily  docusate sodium 100 milliGRAM(s) Oral two times a day  enoxaparin Injectable 40 milliGRAM(s) SubCutaneous daily  ondansetron Injectable 4 milliGRAM(s) IV Push every 6 hours  pantoprazole    Tablet 40 milliGRAM(s) Oral before breakfast  polyethylene glycol 3350 17 Gram(s) Oral two times a day  sodium chloride 0.9%. 1000 milliLiter(s) (100 mL/Hr) IV Continuous <Continuous>    MEDICATIONS  (PRN):  acetaminophen    Suspension. 650 milliGRAM(s) Oral every 6 hours PRN Mild Pain (1 - 3)  ALPRAZolam 0.5 milliGRAM(s) Oral every 8 hours PRN patient request. Anxiety.      ALLERGIES:  Allergies    aspirin (Hives)    Intolerances        LABS:                        11.4   3.2   )-----------( 141      ( 27 May 2018 06:39 )             36.2     05-27    135  |  96  |  12  ----------------------------<  102<H>  4.0   |  26  |  0.70    Ca    8.4      27 May 2018 06:39  Phos  3.2     05-27  Mg     2.0     05-27    TPro  6.4  /  Alb  3.2<L>  /  TBili  0.8  /  DBili  x   /  AST  15  /  ALT  17  /  AlkPhos  42  05-27        CAPILLARY BLOOD GLUCOSE          RADIOLOGY & ADDITIONAL TESTS: Reviewed.

## 2018-05-27 NOTE — PROGRESS NOTE ADULT - ATTENDING COMMENTS
Doing well. For neulasta injection at about 2am tomorrow morning. The patient is encouraged to spend time up OOB.

## 2018-05-27 NOTE — PROGRESS NOTE ADULT - PROBLEM SELECTOR PROBLEM 4
Anxiety
Need for prophylactic measure

## 2018-05-27 NOTE — PROGRESS NOTE ADULT - PROBLEM SELECTOR PLAN 4
F no fluids needed  E replete as needed  N diet soft  A as tolerated   D RMF  DVT ppx: Will plan to start Lovenox in setting of metastatic HNSCC
F: IVF for poor PO intake during chemo; d/c as hypotension improves  E replete as needed  N diet soft  A as tolerated   D RMF  DVT ppx: Lovenox in setting of metastatic HNSCC
Xanaex 0.5mg Q 8 hrs PRN.
F no fluids needed  E replete as needed  N diet soft  A as tolerated   D RMF  DVT ppx: Will plan to start Lovenox in setting of metastatic HNSCC

## 2018-05-27 NOTE — PROGRESS NOTE ADULT - SUBJECTIVE AND OBJECTIVE BOX
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 Completed chemotherapy around 2AM. Nausea has resolved. Appetite is adequate but decreased from before. No other acute complaints.       MEDICATIONS  (STANDING):  MEDICATIONS  (STANDING):  cyclobenzaprine 10 milliGRAM(s) Oral daily  docusate sodium 100 milliGRAM(s) Oral two times a day  enoxaparin Injectable 40 milliGRAM(s) SubCutaneous daily  ondansetron Injectable 4 milliGRAM(s) IV Push every 6 hours  pantoprazole    Tablet 40 milliGRAM(s) Oral before breakfast  polyethylene glycol 3350 17 Gram(s) Oral two times a day  sodium chloride 0.9%. 1000 milliLiter(s) (100 mL/Hr) IV Continuous <Continuous>    MEDICATIONS  (PRN):  acetaminophen    Suspension. 650 milliGRAM(s) Oral every 6 hours PRN Mild Pain (1 - 3)  ALPRAZolam 0.5 milliGRAM(s) Oral every 8 hours PRN patient request. Anxiety.        Allergies    aspirin (Hives)    Intolerances          PHYSICAL EXAM:  T(C): 36.9 (05-27-18 @ 10:46), Max: 37.4 (05-26-18 @ 16:27)  T(F): 98.5 (05-27-18 @ 10:46), Max: 99.3 (05-26-18 @ 16:27)  HR: 101 (05-27-18 @ 10:46) (94 - 101)  BP: 106/74 (05-27-18 @ 10:46) (98/71 - 111/77)  ABP: --  ABP(mean): --  RR: 19 (05-27-18 @ 10:46) (18 - 20)  SpO2: 96% (05-27-18 @ 10:46) (96% - 99%)        Constitutional: In good spirits, AAOx3  HEENT: NCAT, no conjunctival pallor, no scleral icterus, no masses noted, tan line noted demarcating previous radiation field   Neck: supple, no jvd  Cardio: RRR, Normal S1S2  Pulm: CTABL, no wheezing   Abd: Soft, nt/nd, +BS, no hepatomegaly  Ext: No peripheral edema, distal pulses intact  Musculoskeletal: Normal ROM  Neuro: CN II-XII intact       LABS:                           11.4   3.2   )-----------( 141      ( 27 May 2018 06:39 )             36.2         CBC Full  -  ( 27 May 2018 06:39 )  WBC Count : 3.2 K/uL  Hemoglobin : 11.4 g/dL  Hematocrit : 36.2 %  Platelet Count - Automated : 141 K/uL  Mean Cell Volume : 78.2 fL  Mean Cell Hemoglobin : 24.6 pg  Mean Cell Hemoglobin Concentration : 31.5 g/dL  Auto Neutrophil # : x  Auto Lymphocyte # : x  Auto Monocyte # : x  Auto Eosinophil # : x  Auto Basophil # : x  Auto Neutrophil % : 79.7 %  Auto Lymphocyte % : 18.2 %  Auto Monocyte % : 1.2 %  Auto Eosinophil % : 0.9 %  Auto Basophil % : x        05-27    135  |  96  |  12  ----------------------------<  102<H>  4.0   |  26  |  0.70    Ca    8.4      27 May 2018 06:39  Phos  3.2     05-27  Mg     2.0     05-27    TPro  6.4  /  Alb  3.2<L>  /  TBili  0.8  /  DBili  x   /  AST  15  /  ALT  17  /  AlkPhos  42  05-27

## 2018-05-27 NOTE — PROGRESS NOTE ADULT - ASSESSMENT
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 for induction of chemotherapy.

## 2018-05-27 NOTE — PROGRESS NOTE ADULT - PROBLEM SELECTOR PLAN 1
The patient has completed the first course of induction chemotherapy.   For repeat MRI prior to next course of induction chemotherapy. If no improvement due to chemotherapy will the proceed with chemoradiation therapy. The patient has completed the first course of induction chemotherapy.   For repeat MRI prior to next course of induction chemotherapy. If no improvement due to chemotherapy will then proceed with chemoradiation therapy.

## 2018-05-28 VITALS
OXYGEN SATURATION: 100 % | HEART RATE: 106 BPM | DIASTOLIC BLOOD PRESSURE: 77 MMHG | SYSTOLIC BLOOD PRESSURE: 112 MMHG | RESPIRATION RATE: 18 BRPM | TEMPERATURE: 98 F

## 2018-05-28 LAB
ALBUMIN SERPL ELPH-MCNC: 3.3 G/DL — SIGNIFICANT CHANGE UP (ref 3.3–5)
ALP SERPL-CCNC: 47 U/L — SIGNIFICANT CHANGE UP (ref 40–120)
ALT FLD-CCNC: 20 U/L — SIGNIFICANT CHANGE UP (ref 10–45)
ANION GAP SERPL CALC-SCNC: 12 MMOL/L — SIGNIFICANT CHANGE UP (ref 5–17)
AST SERPL-CCNC: 16 U/L — SIGNIFICANT CHANGE UP (ref 10–40)
BASOPHILS NFR BLD AUTO: 0.7 % — SIGNIFICANT CHANGE UP (ref 0–2)
BILIRUB SERPL-MCNC: 0.7 MG/DL — SIGNIFICANT CHANGE UP (ref 0.2–1.2)
BUN SERPL-MCNC: 12 MG/DL — SIGNIFICANT CHANGE UP (ref 7–23)
CALCIUM SERPL-MCNC: 8.7 MG/DL — SIGNIFICANT CHANGE UP (ref 8.4–10.5)
CHLORIDE SERPL-SCNC: 97 MMOL/L — SIGNIFICANT CHANGE UP (ref 96–108)
CO2 SERPL-SCNC: 28 MMOL/L — SIGNIFICANT CHANGE UP (ref 22–31)
CREAT SERPL-MCNC: 0.79 MG/DL — SIGNIFICANT CHANGE UP (ref 0.5–1.3)
EOSINOPHIL NFR BLD AUTO: 0 % — SIGNIFICANT CHANGE UP (ref 0–6)
GLUCOSE SERPL-MCNC: 110 MG/DL — HIGH (ref 70–99)
HCT VFR BLD CALC: 36.6 % — SIGNIFICANT CHANGE UP (ref 34.5–45)
HGB BLD-MCNC: 11.8 G/DL — SIGNIFICANT CHANGE UP (ref 11.5–15.5)
LDH SERPL L TO P-CCNC: 217 U/L — SIGNIFICANT CHANGE UP (ref 50–242)
LYMPHOCYTES # BLD AUTO: 12.2 % — LOW (ref 13–44)
MAGNESIUM SERPL-MCNC: 1.8 MG/DL — SIGNIFICANT CHANGE UP (ref 1.6–2.6)
MCHC RBC-ENTMCNC: 24.8 PG — LOW (ref 27–34)
MCHC RBC-ENTMCNC: 32.2 G/DL — SIGNIFICANT CHANGE UP (ref 32–36)
MCV RBC AUTO: 76.9 FL — LOW (ref 80–100)
MONOCYTES NFR BLD AUTO: 1.2 % — LOW (ref 2–14)
NEUTROPHILS NFR BLD AUTO: 85.9 % — HIGH (ref 43–77)
PHOSPHATE SERPL-MCNC: 3.1 MG/DL — SIGNIFICANT CHANGE UP (ref 2.5–4.5)
PLATELET # BLD AUTO: 130 K/UL — LOW (ref 150–400)
POTASSIUM SERPL-MCNC: 3.6 MMOL/L — SIGNIFICANT CHANGE UP (ref 3.5–5.3)
POTASSIUM SERPL-SCNC: 3.6 MMOL/L — SIGNIFICANT CHANGE UP (ref 3.5–5.3)
PROT SERPL-MCNC: 6.5 G/DL — SIGNIFICANT CHANGE UP (ref 6–8.3)
RBC # BLD: 4.76 M/UL — SIGNIFICANT CHANGE UP (ref 3.8–5.2)
RBC # FLD: 16.5 % — SIGNIFICANT CHANGE UP (ref 10.3–16.9)
SODIUM SERPL-SCNC: 137 MMOL/L — SIGNIFICANT CHANGE UP (ref 135–145)
URATE SERPL-MCNC: 4 MG/DL — SIGNIFICANT CHANGE UP (ref 2.5–7)
WBC # BLD: 4.2 K/UL — SIGNIFICANT CHANGE UP (ref 3.8–10.5)
WBC # FLD AUTO: 4.2 K/UL — SIGNIFICANT CHANGE UP (ref 3.8–10.5)

## 2018-05-28 PROCEDURE — 85610 PROTHROMBIN TIME: CPT

## 2018-05-28 PROCEDURE — 82962 GLUCOSE BLOOD TEST: CPT

## 2018-05-28 PROCEDURE — 85027 COMPLETE CBC AUTOMATED: CPT

## 2018-05-28 PROCEDURE — 85730 THROMBOPLASTIN TIME PARTIAL: CPT

## 2018-05-28 PROCEDURE — 36415 COLL VENOUS BLD VENIPUNCTURE: CPT

## 2018-05-28 PROCEDURE — C1751: CPT

## 2018-05-28 PROCEDURE — 88305 TISSUE EXAM BY PATHOLOGIST: CPT

## 2018-05-28 PROCEDURE — A9585: CPT

## 2018-05-28 PROCEDURE — 87075 CULTR BACTERIA EXCEPT BLOOD: CPT

## 2018-05-28 PROCEDURE — 76937 US GUIDE VASCULAR ACCESS: CPT

## 2018-05-28 PROCEDURE — 88341 IMHCHEM/IMCYTCHM EA ADD ANTB: CPT

## 2018-05-28 PROCEDURE — 83930 ASSAY OF BLOOD OSMOLALITY: CPT

## 2018-05-28 PROCEDURE — 86900 BLOOD TYPING SEROLOGIC ABO: CPT

## 2018-05-28 PROCEDURE — 77001 FLUOROGUIDE FOR VEIN DEVICE: CPT

## 2018-05-28 PROCEDURE — 83735 ASSAY OF MAGNESIUM: CPT

## 2018-05-28 PROCEDURE — 86901 BLOOD TYPING SEROLOGIC RH(D): CPT

## 2018-05-28 PROCEDURE — 70543 MRI ORBT/FAC/NCK W/O &W/DYE: CPT

## 2018-05-28 PROCEDURE — 92610 EVALUATE SWALLOWING FUNCTION: CPT | Mod: GN

## 2018-05-28 PROCEDURE — A9552: CPT

## 2018-05-28 PROCEDURE — 36569 INSJ PICC 5 YR+ W/O IMAGING: CPT

## 2018-05-28 PROCEDURE — C1889: CPT

## 2018-05-28 PROCEDURE — 80048 BASIC METABOLIC PNL TOTAL CA: CPT

## 2018-05-28 PROCEDURE — 88331 PATH CONSLTJ SURG 1 BLK 1SPC: CPT

## 2018-05-28 PROCEDURE — 84550 ASSAY OF BLOOD/URIC ACID: CPT

## 2018-05-28 PROCEDURE — 86850 RBC ANTIBODY SCREEN: CPT

## 2018-05-28 PROCEDURE — 99232 SBSQ HOSP IP/OBS MODERATE 35: CPT

## 2018-05-28 PROCEDURE — 84100 ASSAY OF PHOSPHORUS: CPT

## 2018-05-28 PROCEDURE — 85025 COMPLETE CBC W/AUTO DIFF WBC: CPT

## 2018-05-28 PROCEDURE — 78815 PET IMAGE W/CT SKULL-THIGH: CPT

## 2018-05-28 PROCEDURE — 83615 LACTATE (LD) (LDH) ENZYME: CPT

## 2018-05-28 PROCEDURE — 80053 COMPREHEN METABOLIC PANEL: CPT

## 2018-05-28 PROCEDURE — 87070 CULTURE OTHR SPECIMN AEROBIC: CPT

## 2018-05-28 RX ORDER — POTASSIUM CHLORIDE 20 MEQ
40 PACKET (EA) ORAL ONCE
Qty: 0 | Refills: 0 | Status: COMPLETED | OUTPATIENT
Start: 2018-05-28 | End: 2018-05-28

## 2018-05-28 RX ORDER — POLYETHYLENE GLYCOL 3350 17 G/17G
17 POWDER, FOR SOLUTION ORAL
Qty: 0 | Refills: 0 | COMMUNITY
Start: 2018-05-28

## 2018-05-28 RX ORDER — DOCUSATE SODIUM 100 MG
1 CAPSULE ORAL
Qty: 0 | Refills: 0 | COMMUNITY
Start: 2018-05-28

## 2018-05-28 RX ORDER — ONDANSETRON 8 MG/1
1 TABLET, FILM COATED ORAL
Qty: 30 | Refills: 0 | OUTPATIENT
Start: 2018-05-28

## 2018-05-28 RX ORDER — ONDANSETRON 8 MG/1
1 TABLET, FILM COATED ORAL
Qty: 0 | Refills: 0 | COMMUNITY
Start: 2018-05-28

## 2018-05-28 RX ORDER — ALPRAZOLAM 0.25 MG
1 TABLET ORAL
Qty: 0 | Refills: 0 | COMMUNITY
Start: 2018-05-28

## 2018-05-28 RX ORDER — MAGNESIUM SULFATE 500 MG/ML
1 VIAL (ML) INJECTION ONCE
Qty: 0 | Refills: 0 | Status: COMPLETED | OUTPATIENT
Start: 2018-05-28 | End: 2018-05-28

## 2018-05-28 RX ORDER — DIPHENHYDRAMINE HYDROCHLORIDE AND LIDOCAINE HYDROCHLORIDE AND ALUMINUM HYDROXIDE AND MAGNESIUM HYDRO
15 KIT THREE TIMES A DAY
Qty: 0 | Refills: 0 | Status: DISCONTINUED | OUTPATIENT
Start: 2018-05-28 | End: 2018-05-28

## 2018-05-28 RX ORDER — DIPHENHYDRAMINE HYDROCHLORIDE AND LIDOCAINE HYDROCHLORIDE AND ALUMINUM HYDROXIDE AND MAGNESIUM HYDRO
3 KIT
Qty: 500 | Refills: 0 | OUTPATIENT
Start: 2018-05-28

## 2018-05-28 RX ADMIN — POLYETHYLENE GLYCOL 3350 17 GRAM(S): 17 POWDER, FOR SOLUTION ORAL at 05:50

## 2018-05-28 RX ADMIN — Medication 40 MILLIEQUIVALENT(S): at 08:31

## 2018-05-28 RX ADMIN — PANTOPRAZOLE SODIUM 40 MILLIGRAM(S): 20 TABLET, DELAYED RELEASE ORAL at 06:35

## 2018-05-28 RX ADMIN — ENOXAPARIN SODIUM 40 MILLIGRAM(S): 100 INJECTION SUBCUTANEOUS at 11:13

## 2018-05-28 RX ADMIN — CYCLOBENZAPRINE HYDROCHLORIDE 10 MILLIGRAM(S): 10 TABLET, FILM COATED ORAL at 11:12

## 2018-05-28 RX ADMIN — Medication 100 GRAM(S): at 08:32

## 2018-05-28 RX ADMIN — PEGFILGRASTIM-CBQV 6 MILLIGRAM(S): 6 INJECTION, SOLUTION SUBCUTANEOUS at 01:39

## 2018-05-28 RX ADMIN — ONDANSETRON 4 MILLIGRAM(S): 8 TABLET, FILM COATED ORAL at 05:50

## 2018-05-28 RX ADMIN — ONDANSETRON 4 MILLIGRAM(S): 8 TABLET, FILM COATED ORAL at 11:12

## 2018-05-28 RX ADMIN — Medication 100 MILLIGRAM(S): at 05:50

## 2018-05-28 RX ADMIN — SODIUM CHLORIDE 100 MILLILITER(S): 9 INJECTION INTRAMUSCULAR; INTRAVENOUS; SUBCUTANEOUS at 06:35

## 2018-05-28 NOTE — PROGRESS NOTE ADULT - ATTENDING COMMENTS
Doing well so far s/p initial course of induction chemotherapy. Will plan to repeat the orbit face neck MRI prior to her next course of chemotherapy to look for response. If there is no response will proceed with chemoradiation therapy. The patient will go home with the PICC line that was placed here on admission.

## 2018-05-28 NOTE — PROGRESS NOTE ADULT - ASSESSMENT
The first course of induction chemotherapy is now complete. The patient tolerated this therapy relatively well.

## 2018-05-28 NOTE — PROGRESS NOTE ADULT - PROBLEM SELECTOR PROBLEM 1
SCC (squamous cell carcinoma)
Squamous cell cancer of skin of jawline

## 2018-05-28 NOTE — PROGRESS NOTE ADULT - PROBLEM SELECTOR PROBLEM 2
Hypertension, well controlled
Hypertension, well controlled
Gastroesophageal reflux disease, esophagitis presence not specified
HTN (hypertension)
HTN (hypertension)
Hypertension, well controlled

## 2018-05-28 NOTE — PROGRESS NOTE ADULT - PROVIDER SPECIALTY LIST ADULT
ENT
Heme/Onc
Hospitalist
Internal Medicine
Heme/Onc
Heme/Onc

## 2018-05-28 NOTE — PROGRESS NOTE ADULT - PROBLEM SELECTOR PLAN 2
Continue current meds.
- HCTZ 12.5mg daily discontinued during chemotherapy induction   - Cozaar 50mg
- HCTZ 12.5mg daily discontinued during chemotherapy induction   - Cozaar 50mg
- HCTZ 12.5mg daily discontinued during chemotherapy induction   - Cozaar 50mg held in setting of hypotension 2/2 poor PO intake during chemo; restart as tolerated
-C/W HCTZ 12.5mg daily, Cozaar 50mg   (pt has not required PRN Enalaprilat or BB during this admission)
-C/W HCTZ 12.5mg daily, Cozaar 50mg   (pt has not required PRN Enalaprilat or BB during this admission)
Continue PPI. If nausea occurs, the patient is advised to take ondansetron.
Continue current medication.
Continue current meds.
Continue current regimen.
Continue her usual therapy for this.
Pressure was low during the dizziness episode and it improved with IV hydration.
- HCTZ 12.5mg daily discontinued during chemotherapy induction   - Cozaar 50mg

## 2018-05-28 NOTE — PROGRESS NOTE ADULT - SUBJECTIVE AND OBJECTIVE BOX
The patient offers no complaints this am. She received the neulasta injection earlier this am. She reports being able to eat a bit yesterday. She was also able to move her bowels.    CHEMOTHERAPY REGIMEN:        Day:                          Diet:  Protocol:   DCF induction chemotherapy completed.                                 IVF:      MEDICATIONS  (STANDING):  cyclobenzaprine 10 milliGRAM(s) Oral daily  docusate sodium 100 milliGRAM(s) Oral two times a day  enoxaparin Injectable 40 milliGRAM(s) SubCutaneous daily  magnesium sulfate  IVPB 1 Gram(s) IV Intermittent once  ondansetron Injectable 4 milliGRAM(s) IV Push every 6 hours  pantoprazole    Tablet 40 milliGRAM(s) Oral before breakfast  polyethylene glycol 3350 17 Gram(s) Oral two times a day  potassium chloride   Powder 40 milliEquivalent(s) Oral once  sodium chloride 0.9%. 1000 milliLiter(s) (100 mL/Hr) IV Continuous <Continuous>    MEDICATIONS  (PRN):  acetaminophen    Suspension. 650 milliGRAM(s) Oral every 6 hours PRN Mild Pain (1 - 3)  ALPRAZolam 0.5 milliGRAM(s) Oral every 8 hours PRN patient request. Anxiety.      Allergies    aspirin (Hives)    Intolerances        DVT Prophylaxis: [x ] YES [ ] NO      Antibiotics: [ ] YES [x ] NO    Pain Scale (1-10):       Location:    Vital Signs Last 24 Hrs  T(C): 36.9 (28 May 2018 05:50), Max: 37.1 (27 May 2018 16:51)  T(F): 98.4 (28 May 2018 05:50), Max: 98.8 (27 May 2018 16:51)  HR: 108 (28 May 2018 05:50) (95 - 108)  BP: 117/83 (28 May 2018 05:50) (106/74 - 117/83)  BP(mean): --  RR: 18 (28 May 2018 05:50) (18 - 19)  SpO2: 99% (28 May 2018 05:50) (96% - 99%)    Drug Dosing Weight  Height (cm): 167.64 (17 May 2018 13:12)  Weight (kg): 100 (17 May 2018 13:12)  BMI (kg/m2): 35.6 (17 May 2018 13:12)  BSA (m2): 2.08 (17 May 2018 13:12)    PHYSICAL EXAM:      Constitutional: no complaints.  Eyes: conjunctiva pink.  ENMT: no evidence of mucostomatitis.  Neck: no pain on palp over the C-spine.  Back: no SPT.  Respiratory: chest clear.  Cardiovascular: S1>S2 at apex. ST.  Gastrointestinal: soft, nontender, active bowel sounds.  Genitourinary: voiding without difficulty.  Extremities: no leg edema.  Neurological: no gross focal deficits.  Skin: warm and dry.  Lymph Nodes: none palp.  Musculoskeletal: full ROM.  Psychiatric: affect normal.        URINARY CATHETER: [ ] YES [x] NO     LABS:  CBC Full  -  ( 28 May 2018 06:06 )  WBC Count : 4.2 K/uL  Hemoglobin : 11.8 g/dL  Hematocrit : 36.6 %  Platelet Count - Automated : 130 K/uL  Mean Cell Volume : 76.9 fL  Mean Cell Hemoglobin : 24.8 pg  Mean Cell Hemoglobin Concentration : 32.2 g/dL  Auto Neutrophil # : x  Auto Lymphocyte # : x  Auto Monocyte # : x  Auto Eosinophil # : x  Auto Basophil # : x  Auto Neutrophil % : 85.9 %  Auto Lymphocyte % : 12.2 %  Auto Monocyte % : 1.2 %  Auto Eosinophil % : 0.0 %  Auto Basophil % : 0.7 %    05-28    137  |  97  |  12  ----------------------------<  110<H>  3.6   |  28  |  0.79    Ca    8.7      28 May 2018 06:06  Phos  3.1     05-28  Mg     1.8     05-28    TPro  6.5  /  Alb  3.3  /  TBili  0.7  /  DBili  x   /  AST  16  /  ALT  20  /  AlkPhos  47  05-28          CULTURES:    RADIOLOGY & ADDITIONAL STUDIES:

## 2018-05-28 NOTE — PROGRESS NOTE ADULT - PROBLEM SELECTOR PROBLEM 3
Anxiety
Gastroesophageal reflux disease, esophagitis presence not specified
Gastroesophageal reflux disease, esophagitis presence not specified
HTN (hypertension)
Tachycardia, unspecified
Anxiety

## 2018-05-28 NOTE — PROGRESS NOTE ADULT - PROBLEM SELECTOR PLAN 1
Induction chemotherapy has been completed. The patient has received neulasta for bone marrow support.

## 2018-05-31 DIAGNOSIS — E66.9 OBESITY, UNSPECIFIED: ICD-10-CM

## 2018-05-31 DIAGNOSIS — I10 ESSENTIAL (PRIMARY) HYPERTENSION: ICD-10-CM

## 2018-05-31 DIAGNOSIS — K21.9 GASTRO-ESOPHAGEAL REFLUX DISEASE WITHOUT ESOPHAGITIS: ICD-10-CM

## 2018-05-31 DIAGNOSIS — C79.51 SECONDARY MALIGNANT NEOPLASM OF BONE: ICD-10-CM

## 2018-05-31 DIAGNOSIS — C78.39 SECONDARY MALIGNANT NEOPLASM OF OTHER RESPIRATORY ORGANS: ICD-10-CM

## 2018-05-31 DIAGNOSIS — F41.9 ANXIETY DISORDER, UNSPECIFIED: ICD-10-CM

## 2018-05-31 DIAGNOSIS — R00.0 TACHYCARDIA, UNSPECIFIED: ICD-10-CM

## 2018-05-31 DIAGNOSIS — C41.1 MALIGNANT NEOPLASM OF MANDIBLE: ICD-10-CM

## 2018-05-31 DIAGNOSIS — Z88.6 ALLERGY STATUS TO ANALGESIC AGENT: ICD-10-CM

## 2018-05-31 DIAGNOSIS — M19.90 UNSPECIFIED OSTEOARTHRITIS, UNSPECIFIED SITE: ICD-10-CM

## 2018-06-06 ENCOUNTER — FORM ENCOUNTER (OUTPATIENT)
Age: 48
End: 2018-06-06

## 2018-06-07 ENCOUNTER — APPOINTMENT (OUTPATIENT)
Dept: MRI IMAGING | Facility: HOSPITAL | Age: 48
End: 2018-06-07

## 2018-06-07 ENCOUNTER — OUTPATIENT (OUTPATIENT)
Dept: OUTPATIENT SERVICES | Facility: HOSPITAL | Age: 48
LOS: 1 days | End: 2018-06-07
Payer: COMMERCIAL

## 2018-06-07 DIAGNOSIS — Z41.9 ENCOUNTER FOR PROCEDURE FOR PURPOSES OTHER THAN REMEDYING HEALTH STATE, UNSPECIFIED: Chronic | ICD-10-CM

## 2018-06-07 DIAGNOSIS — Z98.890 OTHER SPECIFIED POSTPROCEDURAL STATES: Chronic | ICD-10-CM

## 2018-06-07 PROCEDURE — 70543 MRI ORBT/FAC/NCK W/O &W/DYE: CPT | Mod: 26

## 2018-06-07 PROCEDURE — A9585: CPT

## 2018-06-07 PROCEDURE — 70543 MRI ORBT/FAC/NCK W/O &W/DYE: CPT

## 2018-06-18 ENCOUNTER — APPOINTMENT (OUTPATIENT)
Dept: RADIATION ONCOLOGY | Facility: CLINIC | Age: 48
End: 2018-06-18

## 2019-05-23 NOTE — PRE-OP CHECKLIST - BP NONINVASIVE DIASTOLIC (MM HG)
Continue with diaper cream or vaseline as needed.  Give a warm bath without bubbles daily as needed if her bottom starts to look sore.  Start working on letting her wipe herself.   76

## 2019-10-15 NOTE — SWALLOW BEDSIDE ASSESSMENT ADULT - SLP PERTINENT HISTORY OF CURRENT PROBLEM
SCCA of right mandible - s/p segmental mandibulectomy with fibula reconstruction, right neck dissection and post op RT completed in December 2017. Recent CT with concern regarding new primary vs. mets Size Of Lesion In Cm: 0.3

## 2020-08-12 NOTE — PATIENT PROFILE ADULT. - PRO SERVICES AT [facility]  Performed by: Glendy Garcia MD  Authorized by: Glendy Garcia MD       General Information and Staff    Start Time:  8/11/2020 9:25 PM  End Time:  8/11/2020 9:29 PM  Anesthesiologist:  Glendy Garcia MD  Performed by:   Anesthesiologist  Patient unsure

## 2020-09-03 NOTE — H&P ADULT - NSHPRISKHIVSCREEN_GEN_ALL_CORE
Consults    Patient Care Team:  Gordo Mg MD as PCP - General (Internal Medicine)  Gordo Mg MD as PCP - Claims Attributed    Chief Complaint   Patient presents with   • Abdominal Pain   • Shortness of Breath       Subjective     History of Present Illness     Gabbi Brandon is an 82 year-old female who presented to [facility] on 9/2/20 via EMS for fever and abdominal pain with cough and shortness of air. She was somewhat confused on presentation and was admitted for further treatment of a right lower lobe pneumonia. She has had multiple hospitalizations the most recent being last month for recurrent aspiration pneumonia and epigastric pain.  She was assessed by speech therapy and a video swallow study was performed and it was recommended a regular diet with thin liquids.  Due to complaints of nausea, vomiting and epigastric pain an EGD was performed by gastroenterology colleagues which demonstrated an 8 to meter hiatal hernia with small paraesophageal component.  At that time, the patient declined surgical evaluation.  She was also admitted in June of this year for the exact same complaints.  At that time speech therapy noted oral pharyngeal dysphasia and she was placed on a dysphagia diet discharge.      Review of Systems   Constitutional: Positive for chills and fever. Negative for diaphoresis.   HENT: Positive for trouble swallowing. Negative for drooling.    Respiratory: Positive for cough, choking and shortness of breath.    Gastrointestinal: Positive for abdominal pain (epigastric) and nausea. Negative for vomiting.   Psychiatric/Behavioral: Positive for confusion and decreased concentration.        Patient Active Problem List   Diagnosis   • Irregular bleeding   • Urinary tract infection   • Dyslipidemia   • Essential hypertension   • Stage 2 chronic kidney disease   • Gastroesophageal reflux disease without esophagitis   • Seasonal allergies   • Familial  hypercholesterolemia   • Bladder spasm   • Bilateral lower extremity edema   • Pneumonia of right lower lobe due to infectious organism   • Failure to thrive in adult   • Hyponatremia   • DNR (do not resuscitate)   • Hypoxia   • Oropharyngeal dysphagia   • Anemia, chronic disease   • CKD (chronic kidney disease) stage 2, GFR 60-89 ml/min   • Hypotension   • Diarrhea   • Hypocalcemia   • Non-intractable vomiting   • Hiatal hernia   • Aspiration pneumonia of right lower lobe (CMS/HCC)   • Metabolic encephalopathy     Past Medical History:   Diagnosis Date   • Arthritis    • GERD (gastroesophageal reflux disease)    • Hyperlipidemia    • Hypertension    • Incontinent of urine    • PNA (pneumonia)    • Seasonal allergies    • UTI (urinary tract infection)      Past Surgical History:   Procedure Laterality Date   • BREAST BIOPSY     • COLONOSCOPY N/A 8/26/2016    Procedure: COLONOSCOPY WITH POLYPECTOMY (HOT SNARE);  Surgeon: Paulino Narvaez MD;  Location: [facility] ENDOSCOPY;  Service:    • ENDOSCOPY N/A 8/11/2020    Procedure: ESOPHAGOGASTRODUODENOSCOPY with biopsies;  Surgeon: Eugene George MD;  Location: [facility] ENDOSCOPY;  Service: Gastroenterology;  Laterality: N/A;  pre-- melena and abdominal pain  post-- hiatel hernia   • VAGINAL HYSTERECTOMY       Family History   Problem Relation Age of Onset   • Colon cancer Brother    • Heart attack Mother    • Stroke Father    • Lung cancer Sister    • Lung cancer Brother         3 brothers   • Deep vein thrombosis Brother    • Brain cancer Brother    • Uterine cancer Neg Hx    • Ovarian cancer Neg Hx    • Breast cancer Neg Hx    • Pulmonary embolism Neg Hx      Social History     Socioeconomic History   • Marital status:      Spouse name: Not on file   • Number of children: Not on file   • Years of education: Not on file   • Highest education level: Not on file   Tobacco Use   • Smoking status: Never Smoker   • Smokeless tobacco: Never Used   Substance and  Sexual Activity   • Alcohol use: Not Currently   • Drug use: No   • Sexual activity: Defer     Medications Prior to Admission   Medication Sig Dispense Refill Last Dose   • acetaminophen-codeine (TYLENOL #3) 300-30 MG per tablet Take 2 tablets by mouth At Night As Needed for Moderate Pain .      • amLODIPine (NORVASC) 5 MG tablet TAKE 1 TABLET EVERY DAY 90 tablet 3 Past Week at Unknown time   • aspirin 81 MG EC tablet Take 81 mg by mouth Daily.   Past Week at Unknown time   • atenolol (TENORMIN) 50 MG tablet TAKE 1 TABLET EVERY DAY 90 tablet 3 Past Week at Unknown time   • celecoxib (CeleBREX) 200 MG capsule Take 200 mg by mouth Daily.   Past Week at Unknown time   • cetirizine (ZyrTEC) 10 MG tablet Take 10 mg by mouth daily.   Past Week at Unknown time   • fluticasone (FLONASE) 50 MCG/ACT nasal spray USE 2 SPRAYS IN EACH NOSTRIL EVERY DAY 48 g 2 Past Week at Unknown time   • lactobacillus acidophilus (RISAQUAD) capsule capsule Take 1 capsule by mouth Daily. 14 each 0    • omeprazole (priLOSEC) 20 MG capsule TAKE 1 CAPSULE EVERY DAY 90 capsule 3 Past Week at Unknown time   • oxybutynin XL (DITROPAN-XL) 5 MG 24 hr tablet Take 1 tablet by mouth Daily. 30 tablet 1 Past Week at Unknown time   • sertraline (Zoloft) 50 MG tablet Take 1 tablet by mouth Daily. 90 tablet 3    • simvastatin (ZOCOR) 20 MG tablet TAKE 1 TABLET BY MOUTH EVERY NIGHT. 90 tablet 3 Past Week at Unknown time     Allergies   Allergen Reactions   • Penicillins Unknown (See Comments)     Caused a red streak down her leg.       Objective      Vital Signs  Temp:  [98 °F (36.7 °C)-98.8 °F (37.1 °C)] 98.2 °F (36.8 °C)  Heart Rate:  [67-91] 74  Resp:  [16-18] 18  BP: (114-136)/(60-74) 128/74    Intake & Output (last day)       09/02 0701 - 09/03 0700 09/03 0701 - 09/04 0700    I.V. (mL/kg)  950 (10.9)    Total Intake(mL/kg)  950 (10.9)    Net  +950          Urine Unmeasured Occurrence 3 x 3 x    Stool Unmeasured Occurrence  2 x          Physical Exam      Constitutional: She appears well-developed and well-nourished.   HENT:   Head: Normocephalic.   Eyes: No scleral icterus.   Neck: No tracheal deviation present.   Cardiovascular: Normal rate, regular rhythm and intact distal pulses.   Pulmonary/Chest: Effort normal. No respiratory distress. She has rales (RLL).   Abdominal: Soft. Bowel sounds are normal. She exhibits no mass. There is no tenderness.   obese   Lymphadenopathy:     She has no cervical adenopathy.   Neurological: She is alert.   Skin: Skin is warm and dry.   Psychiatric: Her behavior is normal. Judgment and thought content normal. Her speech is tangential. She is not actively hallucinating. Cognition and memory are impaired. She is inattentive.   Nursing note and vitals reviewed.      Results Review:    I reviewed the patient's new clinical results.  I reviewed the patient's new imaging results and agree with the interpretation.  I reviewed the patient's other test results and agree with the interpretation  Discussed with patient, RN, Dr. Dc and Dr. Machado.    Imaging Results (Last 24 Hours)     Procedure Component Value Units Date/Time    XR Chest PA & Lateral [[id number]] Collected:  09/03/20 1343     Updated:  09/03/20 1419    Narrative:       TWO-VIEW CHEST     HISTORY: Follow-up of right pleural effusion.     FINDINGS: There is cardiomegaly with mild vascular congestion associated  with a small-to-moderate right pleural effusion and atelectasis at the  right base and this is similar to yesterday's exam. There is a  moderately large hiatus hernia extending into the right chest that is  also unchanged.     This report was finalized on 9/3/2020 2:16 PM by Dr. Sammy Richards M.D.       CT Head Without Contrast [[id number]] Collected:  09/02/20 1402     Updated:  09/03/20 0905    Narrative:       CT SCAN OF THE HEAD WITHOUT CONTRAST     CLINICAL HISTORY: Altered mental status.     CT scan of the head was obtained with 3 mm axial images. No  intravenous  contrast was administered.     COMPARISON: Comparison is made to previous CT scan of the head dated  11/03/2014.     FINDINGS:     The ventricles, sulci, and cisterns are age appropriate. The gray-white  matter differentiation is within normal limits. The basal ganglia and  thalami are unremarkable. The posterior fossa structures are remarkable  for cerebellar atrophy. Similar findings are seen on the previous  examination of 11/03/2014.     Partial opacification of the left mastoid air cells is seen.       Impression:          No evidence for acute intracranial pathology.     Atrophic changes are identified within the cerebellum. Similar findings  were noted on the prior exam of 11/03/2014.     Radiation dose reduction techniques were utilized, including automated  exposure control and exposure modulation based on body size.     This report was finalized on 9/3/2020 9:02 AM by Dr. Hernandez Fernandez M.D.             Lab Results:  Lab Results (last 24 hours)     Procedure Component Value Units Date/Time    Blood Culture - Blood, Arm, Left [[id number]] Collected:  09/02/20 0943    Specimen:  Blood from Arm, Left Updated:  09/03/20 1000     Blood Culture No growth at 24 hours    Blood Culture - Blood, Arm, Left [[id number]] Collected:  09/02/20 0935    Specimen:  Blood from Arm, Left Updated:  09/03/20 1000     Blood Culture No growth at 24 hours    Procalcitonin [[id number]]  (Normal) Collected:  09/03/20 0710    Specimen:  Blood Updated:  09/03/20 0810     Procalcitonin 0.15 ng/mL     Narrative:       As a Marker for Sepsis (Non-Neonates):   1. <0.5 ng/mL represents a low risk of severe sepsis and/or septic shock.  1. >2 ng/mL represents a high risk of severe sepsis and/or septic shock.    As a Marker for Lower Respiratory Tract Infections that require antibiotic therapy:  PCT on Admission     Antibiotic Therapy             6-12 Hrs later  > 0.5                Strongly Recommended            >0.25 - <0.5         " Recommended  0.1 - 0.25           Discouraged                   Remeasure/reassess PCT  <0.1                 Strongly Discouraged          Remeasure/reassess PCT      As 28 day mortality risk marker: \"Change in Procalcitonin Result\" (> 80 % or <=80 %) if Day 0 (or Day 1) and Day 4 values are available. Refer to http://www.[employer] [facility]-pct-calculator.com/   Change in PCT <=80 %   A decrease of PCT levels below or equal to 80 % defines a positive change in PCT test result representing a higher risk for 28-day all-cause mortality of patients diagnosed with severe sepsis or septic shock.  Change in PCT > 80 %   A decrease of PCT levels of more than 80 % defines a negative change in PCT result representing a lower risk for 28-day all-cause mortality of patients diagnosed with severe sepsis or septic shock.                Results may be falsely decreased if patient taking Biotin.     Basic Metabolic Panel [[id number]]  (Abnormal) Collected:  09/03/20 0710    Specimen:  Blood Updated:  09/03/20 0807     Glucose 99 mg/dL      BUN 12 mg/dL      Creatinine 0.71 mg/dL      Sodium 137 mmol/L      Potassium 3.9 mmol/L      Chloride 100 mmol/L      CO2 29.3 mmol/L      Calcium 8.8 mg/dL      eGFR Non African Amer 79 mL/min/1.73      BUN/Creatinine Ratio 16.9     Anion Gap 7.7 mmol/L     Narrative:       GFR Normal >60  Chronic Kidney Disease <60  Kidney Failure <15      Lactic Acid, Plasma [[id number]]  (Normal) Collected:  09/03/20 0710    Specimen:  Blood Updated:  09/03/20 0742     Lactate 1.0 mmol/L     CBC (No Diff) [[id number]]  (Abnormal) Collected:  09/03/20 0710    Specimen:  Blood Updated:  09/03/20 0737     WBC 9.72 10*3/mm3      RBC 3.52 10*6/mm3      Hemoglobin 10.2 g/dL      Hematocrit 32.0 %      MCV 90.9 fL      MCH 29.0 pg      MCHC 31.9 g/dL      RDW 14.5 %      RDW-SD 48.1 fl      MPV 9.5 fL      Platelets 275 10*3/mm3     S. Pneumo Ag Urine or CSF - Urine, Urinary Bladder [[id number]]  (Normal) Collected:  09/02/20 " 1603    Specimen:  Urine from Urinary Bladder Updated:  09/02/20 1835     Strep Pneumo Ag Negative    Legionella Antigen, Urine - Urine, Urinary Bladder [[id number]]  (Normal) Collected:  09/02/20 1603    Specimen:  Urine from Urinary Bladder Updated:  09/02/20 1835     LEGIONELLA ANTIGEN, URINE Negative    Urinalysis With Culture If Indicated - Urine, Clean Catch [[id number]]  (Abnormal) Collected:  09/02/20 1603    Specimen:  Urine, Clean Catch Updated:  09/02/20 1645     Color, UA Yellow     Appearance, UA Clear     pH, UA 5.5     Specific Gravity, UA >=1.030     Glucose, UA Negative     Ketones, UA Trace     Bilirubin, UA Negative     Blood, UA Negative     Protein, UA Trace     Leuk Esterase, UA Negative     Nitrite, UA Negative     Urobilinogen, UA 0.2 E.U./dL    Narrative:       Urine microscopic not indicated.    Lactic Acid, Reflex [[id number]]  (Abnormal) Collected:  09/02/20 1550    Specimen:  Blood Updated:  09/02/20 1645     Lactate 2.4 mmol/L               Assessment/Plan       Pneumonia of right lower lobe due to infectious organism    Essential hypertension    Gastroesophageal reflux disease without esophagitis    Oropharyngeal dysphagia    Anemia, chronic disease    CKD (chronic kidney disease) stage 2, GFR 60-89 ml/min    Hiatal hernia    Aspiration pneumonia of right lower lobe (CMS/HCC)    Metabolic encephalopathy      Assessment & Plan     I personally reviewed the CT scan of the abdomen pelvis performed yesterday which demonstrates a large hiatal hernia with some slight extension into the right chest.  This appears stable.  There is a new small to moderate right pleural effusion with some associated atelectasis and concern for pneumonia.  PA and lateral chest x-ray performed earlier today was also reviewed which shows the effusion is similar in size.    Hiatal Hernia: Patient is tolerating regular diet on my assessment today.  She denies nausea or vomiting in the last 24 hours. Possible  Unable to offer due to clinical condition oropharyngeal component to dysphasia which may be the cause of her aspiration.  She is not extracted, given her comorbidities and age I would not recommend treatment of her hiatal hernia, surgically.    Pleural effusion: Small to moderate on PA and lateral chest x-ray.  Will check CT of the chest.  May recommend pleural effusion if fluid is significant enough on imaging.    I discussed the patients findings and our recommendations with patient, nursing staff, consulting provider and Dr. Dc.    Thank you for this consult and allowing us to participate in the care of your patient.  We will follow along with you during this hospitalization.       ANA LUISA Mireles  [facility]  09/03/20  14:43

## 2021-07-09 NOTE — PRE-OP CHECKLIST - PATIENT'S PERSONAL PROPERTY GIVEN TO
----- Message from Luis Alberto Wilfredo sent at 7/9/2021 11:45 AM EDT -----  Subject: Refill Request    QUESTIONS  Name of Medication? pregabalin (LYRICA) 150 MG capsule  Patient-reported dosage and instructions? twice per day   How many days do you have left? 0  Preferred Pharmacy? [facility] phone number (if available)? [phone]  Additional Information for Provider? Advised of 24-48 hour turnaround time   for patient requests   ---------------------------------------------------------------------------  --------------  CALL BACK INFO  What is the best way for the office to contact you? OK to leave message on   voicemail  Preferred Call Back Phone Number?  [phone]
Medication refused due to inconsistent urine. Pt has been informed twice today.
Patient called to ask if she was still a patient at our office. Informed patient she is but she will no longer be prescribed lyrica. Patient asked if she can have pregabalin instead. Informed patient that is the same medication. Patient states she is going to find another doctor because she would like this medication.
friend/significant other/on unit

## 2022-01-01 NOTE — PROGRESS NOTE ADULT - ASSESSMENT
----- Message from Myranda Bae MD sent at 2022 12:22 PM CST -----  Please call mom. Rosalina's bilirubin has finally started going down. It is down from 12.5 to 10.6. No need to re-check or re-start bili blanket unless concerns arise/mom thinks she is getting more yellow. I would probably have Mom hold onto the blanket until Friday, and if no concerns at that time, she can give it back.     Thanks!  Dr. Whitmore     47F s/p right mandibulectomy, tracheostomy, reconstruction with left free fibula flap. POD 5.  >> Cook Doppler stopped functioning yesterday; now doing flap checks via pencil doppler.   >> q4H flap checks  >> HOB elevation  >> LLE elevation  >> Continue LLE VAC x1 more day; plan to d/c tomorrow  >> As per ENT, patient still too swollen to perform swallow study  >> Continue tube feeds  >> DVT prophylaxis  >> Ambulate as tolerated; MUST wear CAM boot on LLE when ambulating and is full weight bearing on LLE.

## 2022-04-28 NOTE — ASU PATIENT PROFILE, ADULT - PRO MENTAL HEALTH SX RECENT
Second TB test was negative.    UA orders as below:  Component      Latest Ref Rng & Units 4/20/2022 4/25/2022   COLOR       Yellow Yellow   CLARITY       Hazy Hazy   GLUCOSE(URINE)      Negative mg/dL Negative Negative   BILIRUBIN      Negative Negative Negative   KETONES      Negative mg/dL Negative Trace (A)   SPECIFIC GRAVITY      1.005 - 1.030 1.025 1.023   BLOOD      Negative Moderate (A) Moderate (A)   pH      5.0 - 7.0 5.0 5.0   PROTEIN(URINE)      Negative mg/dL Negative Negative   UROBILINOGEN      0.2, 1.0 mg/dL 0.2 0.2   NITRITE      Negative Negative Negative   LEUKOCYTE ESTERASE      Negative Negative Negative   Squamous EPI'S      None Seen, 1 to 5 /hpf 1 to 5 1 to 5   ERYTHROCYTES, URINALYSIS      None Seen, 1 to 2 /hpf 11 to 25 (A) 3 to 5 (A)   LEUKOCYTES, URINALYSIS      None Seen, 1 to 5 /hpf 6 to 10 (A) 1 to 5   BACTERIA, URINALYSIS      None Seen /hpf Moderate (A) Few (A)   HYALINE CASTS, URINALYSIS      None Seen, 1 to 5 /lpf None Seen None Seen   MUCOUS       Present Present     Urine culture was not run despite order indicating that culture must be run.     Dr Wilkins: Please review labs above and advise on what you would like passed along to the patient/next steps.     Encounter had been previously closed.    none

## 2022-08-24 NOTE — PROGRESS NOTE ADULT - PROBLEM SELECTOR PLAN 1
Awaiting pathology. Isolated Portal vein thrombosis without Cirrhosis , ? related to recent Covid , if work up for hypercoagulable conditions and MM is negative likely due to Covid related Hypercoagulable condition   No hx of Hypercoagulable Disorder   No Anemia , Hypercalcemia , Renal Failure or Bone Pain   Mild elevation of Gamma Globulin Gap - Check HIV, Hep C negative , Close follow up with Hematology   No elevation of INR , PTT to consider APLA , however APLA tests were ordered   Discharge on [facility]   Out Patient PCP appointment arranged

## 2025-06-11 NOTE — DISCHARGE NOTE ADULT - MEDICATION SUMMARY - MEDICATIONS TO CHANGE
Saadia Grullon (:  2005) is a 19 y.o. female,Established patient, here for evaluation of the following chief complaint(s):  Follow-up         Assessment & Plan  Hospital discharge follow-up            Generalized anxiety disorder   Chronic, not at goal (unstable), continue current treatment plan and medication adherence emphasized         Recurrent major depressive disorder, in partial remission   Chronic, not at goal (unstable), continue current treatment plan and medication adherence emphasized         Attention deficit hyperactivity disorder (ADHD), combined type   Chronic, not at goal (unstable), drug screen today, discussed starting medications    Orders:    Drug Panel-PM-HI Res-UR Interp-A    Controlled substance agreement signed       Orders:    Drug Panel-PM-HI Res-UR Interp-A         Assessment & Plan  1. Depression.  - Admitted to [facility] on 2025 for depression and started on Lexapro 10 mg during inpatient stay.  - Reports that Lexapro makes her very tired.  - Recommended to switch Lexapro administration to nighttime to mitigate side effect.  - Will continue taking Lexapro 10 mg daily; notify office for refill if medication runs low.     2. Anxiety.  - Reports anxiety has been slightly better but remains severe.  - Advised to establish care with a counselor or therapist for additional support.  - Discussed the importance of having a mental health professional, even if not on a weekly basis.  - Encouraged to engage in enjoyable activities like cosmetology to help manage symptoms.    3. Adjustment Disorder with Mixed Disturbance.  - Admitted for adjustment disorder with mixed disturbance.  - Continuation of Lexapro 10 mg daily recommended.  - Advised to engage in activities she enjoys, such as cosmetology, to help manage symptoms.  - Discussed the potential benefit of having a mental health professional for future crises.    4. Attention Deficit Hyperactivity Disorder (ADHD).  - 
I will SWITCH the dose or number of times a day I take the medications listed below when I get home from the hospital:  None